# Patient Record
Sex: MALE | Race: WHITE
[De-identification: names, ages, dates, MRNs, and addresses within clinical notes are randomized per-mention and may not be internally consistent; named-entity substitution may affect disease eponyms.]

---

## 2017-01-05 ENCOUNTER — PRIOR ORIGINAL RECORDS (OUTPATIENT)
Dept: OTHER | Age: 77
End: 2017-01-05

## 2017-01-05 ENCOUNTER — HOSPITAL ENCOUNTER (OUTPATIENT)
Dept: LAB | Facility: HOSPITAL | Age: 77
Discharge: HOME | End: 2017-01-05
Attending: INTERNAL MEDICINE
Payer: MEDICARE

## 2017-01-05 DIAGNOSIS — I48.91 ATRIAL FIBRILLATION (HCC): ICD-10-CM

## 2017-01-05 LAB
ALBUMIN SERPL-MCNC: 4 G/DL (ref 3.5–4.8)
ALP LIVER SERPL-CCNC: 71 U/L (ref 45–117)
ALT SERPL-CCNC: 27 U/L (ref 17–63)
AST SERPL-CCNC: 24 U/L (ref 15–41)
BILIRUB SERPL-MCNC: 1.1 MG/DL (ref 0.1–2)
BUN BLD-MCNC: 15 MG/DL (ref 8–20)
CALCIUM BLD-MCNC: 8.3 MG/DL (ref 8.3–10.3)
CHLORIDE: 106 MMOL/L (ref 101–111)
CHOLEST SMN-MCNC: 83 MG/DL (ref ?–200)
CO2: 30 MMOL/L (ref 22–32)
CREAT BLD-MCNC: 0.86 MG/DL (ref 0.7–1.3)
GLUCOSE BLD-MCNC: 95 MG/DL (ref 70–99)
HDLC SERPL-MCNC: 59 MG/DL (ref 45–?)
HDLC SERPL: 1.41 {RATIO} (ref ?–4.97)
LDLC SERPL CALC-MCNC: 13 MG/DL (ref ?–130)
M PROTEIN MFR SERPL ELPH: 7 G/DL (ref 6.1–8.3)
NONHDLC SERPL-MCNC: 24 MG/DL (ref ?–130)
POC INR: 2.8 (ref 0.8–1.3)
POTASSIUM SERPL-SCNC: 4.1 MMOL/L (ref 3.6–5.1)
SODIUM SERPL-SCNC: 140 MMOL/L (ref 136–144)
TRIGLYCERIDES: 56 MG/DL (ref ?–150)
VLDL: 11 MG/DL (ref 5–40)

## 2017-01-05 PROCEDURE — 85610 PROTHROMBIN TIME: CPT

## 2017-01-05 PROCEDURE — 36415 COLL VENOUS BLD VENIPUNCTURE: CPT | Performed by: INTERNAL MEDICINE

## 2017-01-05 PROCEDURE — 80053 COMPREHEN METABOLIC PANEL: CPT | Performed by: INTERNAL MEDICINE

## 2017-01-05 PROCEDURE — 80061 LIPID PANEL: CPT | Performed by: INTERNAL MEDICINE

## 2017-01-11 LAB
ALBUMIN: 4 G/DL
ALKALINE PHOSPHATATE(ALK PHOS): 71 IU/L
BILIRUBIN TOTAL: 1.1 MG/DL
BUN: 15 MG/DL
CALCIUM: 8.3 MG/DL
CHLORIDE: 106 MEQ/L
CHOLESTEROL, TOTAL: 83 MG/DL
CREATININE, SERUM: 0.86 MG/DL
GLUCOSE: 95 MG/DL
HDL CHOLESTEROL: 59 MG/DL
LDL CHOLESTEROL: 13 MG/DL
POTASSIUM, SERUM: 4.1 MEQ/L
PROTEIN, TOTAL: 7 G/DL
SGOT (AST): 24 IU/L
SGPT (ALT): 27 IU/L
SODIUM: 140 MEQ/L
TRIGLYCERIDES: 56 MG/DL

## 2017-01-16 ENCOUNTER — PRIOR ORIGINAL RECORDS (OUTPATIENT)
Dept: OTHER | Age: 77
End: 2017-01-16

## 2017-02-24 ENCOUNTER — HOSPITAL ENCOUNTER (OUTPATIENT)
Dept: LAB | Facility: HOSPITAL | Age: 77
Discharge: HOME | End: 2017-02-24
Attending: INTERNAL MEDICINE
Payer: MEDICARE

## 2017-02-24 DIAGNOSIS — I48.91 ATRIAL FIBRILLATION (HCC): ICD-10-CM

## 2017-02-24 LAB — POC INR: 3.6 (ref 0.8–1.3)

## 2017-02-24 PROCEDURE — 85610 PROTHROMBIN TIME: CPT

## 2017-03-10 ENCOUNTER — HOSPITAL ENCOUNTER (OUTPATIENT)
Dept: LAB | Facility: HOSPITAL | Age: 77
Discharge: HOME | End: 2017-03-10
Attending: INTERNAL MEDICINE
Payer: MEDICARE

## 2017-03-10 DIAGNOSIS — I48.91 ATRIAL FIBRILLATION (HCC): ICD-10-CM

## 2017-03-10 LAB — POC INR: 3.4 (ref 0.8–1.3)

## 2017-03-10 PROCEDURE — 85610 PROTHROMBIN TIME: CPT

## 2017-03-23 ENCOUNTER — HOSPITAL ENCOUNTER (OUTPATIENT)
Dept: LAB | Facility: HOSPITAL | Age: 77
Discharge: HOME | End: 2017-03-23
Attending: INTERNAL MEDICINE
Payer: MEDICARE

## 2017-03-23 DIAGNOSIS — I48.91 ATRIAL FIBRILLATION (HCC): ICD-10-CM

## 2017-03-23 LAB — POC INR: 2.9 (ref 0.8–1.3)

## 2017-03-23 PROCEDURE — 85610 PROTHROMBIN TIME: CPT

## 2017-04-01 ENCOUNTER — APPOINTMENT (OUTPATIENT)
Dept: CT IMAGING | Facility: HOSPITAL | Age: 77
End: 2017-04-01
Attending: EMERGENCY MEDICINE
Payer: MEDICARE

## 2017-04-01 ENCOUNTER — HOSPITAL ENCOUNTER (EMERGENCY)
Facility: HOSPITAL | Age: 77
Discharge: HOME | End: 2017-04-01
Attending: EMERGENCY MEDICINE
Payer: MEDICARE

## 2017-04-01 VITALS
TEMPERATURE: 98 F | HEART RATE: 68 BPM | RESPIRATION RATE: 16 BRPM | DIASTOLIC BLOOD PRESSURE: 71 MMHG | OXYGEN SATURATION: 95 % | HEIGHT: 72 IN | SYSTOLIC BLOOD PRESSURE: 139 MMHG | BODY MASS INDEX: 23.03 KG/M2 | WEIGHT: 170 LBS

## 2017-04-01 DIAGNOSIS — R51.9 NONINTRACTABLE HEADACHE, UNSPECIFIED CHRONICITY PATTERN, UNSPECIFIED HEADACHE TYPE: Primary | ICD-10-CM

## 2017-04-01 PROCEDURE — 36415 COLL VENOUS BLD VENIPUNCTURE: CPT

## 2017-04-01 PROCEDURE — 70450 CT HEAD/BRAIN W/O DYE: CPT

## 2017-04-01 PROCEDURE — 93005 ELECTROCARDIOGRAM TRACING: CPT

## 2017-04-01 PROCEDURE — 99284 EMERGENCY DEPT VISIT MOD MDM: CPT

## 2017-04-01 PROCEDURE — 93010 ELECTROCARDIOGRAM REPORT: CPT

## 2017-04-01 PROCEDURE — 80048 BASIC METABOLIC PNL TOTAL CA: CPT | Performed by: EMERGENCY MEDICINE

## 2017-04-01 PROCEDURE — 84484 ASSAY OF TROPONIN QUANT: CPT | Performed by: EMERGENCY MEDICINE

## 2017-04-01 PROCEDURE — 99285 EMERGENCY DEPT VISIT HI MDM: CPT

## 2017-04-01 PROCEDURE — 85025 COMPLETE CBC W/AUTO DIFF WBC: CPT | Performed by: EMERGENCY MEDICINE

## 2017-04-01 NOTE — ED PROVIDER NOTES
Patient Seen in: [facility] Emergency Department    History   Patient presents with: Other    Stated Complaint: other    HPI    Jared Pinon is a pleasant 39-year-old male coming with complaints of evaluation.   Patient states that for the last 4 days in FOR PAIN MANAGEMENT    FLUOROSCOPIC GUIDANCE NEEDLE PLACEMENT  1/8/2014    Comment Procedure: LUMBAR FACET INJECTION OR MEDIAL BRANCH NERVE BLOCK;  Surgeon: Todd Clark MD;  Location: [address] [place] [address] Location: [facility]    INJ PARAVERT F JNT L/S 2 LEV Right 8/5/2015    Comment Procedure: LUMBAR FACET INJECTION OR MEDIAL BRANCH NERVE BLOCK;  Surgeon: Augustus Benavides MD;  Location: [address] Comment Procedure: TRANSFORAMINAL EPIDURAL - LUMBAR;  Surgeon: Kadeem Park MD;  Location: [facility] FOR PAIN MANAGEMENT    INJECTION, ANESTHETIC/STEROID, TRANSFORAMINAL EPIDURAL; LUMBAR/SACRAL, ADD'L LEVEL Right 9/4/2015    Comment Procedure: Mary Jo Mater FOLLOWING EVENTS N/A 10/2/2015    Comment Procedure: LUMBAR EPIDURAL;  Surgeon: Lilo Lozano MD;  Location: [address] [place] [address]    OTHER SURGICAL HISTORY      Comment Tonsillectomy uvelectomy     OTHER SURGICAL HISTORY  10/2015    Comment purvi MEDIAL BRANCH NERVE BLOCK;  Surgeon: Dequan Leos MD;  Location: [facility] FOR PAIN MANAGEMENT    INJECTION, ANESTHETIC/STEROID, TRANSFORAMINAL EPIDURAL; LUMBAR/SACRAL, SINGLE LEVEL Right 3/2/2016    Comment Procedure: TRANSFORAMINAL EPIDURAL - LUMBAR; ANESTHETIC/STEROID, TRANSFORAMINAL EPIDURAL; LUMBAR/SACRAL, SINGLE LEVEL Right 3/29/2016    Comment Procedure: TRANSFORAMINAL EPIDURAL - LUMBAR;  Surgeon: Bree Matos MD;  Location: [address] [place] [address]    INJECTION, ANESTHETIC/STEROID, TRANS Status: Never Used                        Alcohol Use: Yes           0.0 oz/week       0 Standard drinks or equivalent per week       Comment: 0-2 per month      Review of Systems    Positive for stated complaint: other  Other systems are as noted in HPI. following:     .0 (*)     MCH 33.3 (*)     All other components within normal limits   TROPONIN I - Normal   CBC WITH DIFFERENTIAL WITH PLATELET    Narrative: The following orders were created for panel order CBC WITH DIFFERENTIAL WITH PLATELET.

## 2017-04-06 PROBLEM — D64.9 MILD ANEMIA: Status: ACTIVE | Noted: 2017-04-06

## 2017-04-06 PROBLEM — R73.9 ELEVATED BLOOD SUGAR: Status: ACTIVE | Noted: 2017-04-06

## 2017-04-06 PROCEDURE — 83921 ORGANIC ACID SINGLE QUANT: CPT | Performed by: OTHER

## 2017-04-06 PROCEDURE — 86038 ANTINUCLEAR ANTIBODIES: CPT | Performed by: OTHER

## 2017-04-06 PROCEDURE — 84425 ASSAY OF VITAMIN B-1: CPT | Performed by: OTHER

## 2017-04-06 PROCEDURE — 86235 NUCLEAR ANTIGEN ANTIBODY: CPT | Performed by: OTHER

## 2017-04-06 PROCEDURE — 86618 LYME DISEASE ANTIBODY: CPT | Performed by: OTHER

## 2017-04-06 PROCEDURE — 82607 VITAMIN B-12: CPT | Performed by: OTHER

## 2017-04-06 PROCEDURE — 82746 ASSAY OF FOLIC ACID SERUM: CPT | Performed by: OTHER

## 2017-04-06 PROCEDURE — 86225 DNA ANTIBODY NATIVE: CPT | Performed by: OTHER

## 2017-04-11 PROCEDURE — 86235 NUCLEAR ANTIGEN ANTIBODY: CPT | Performed by: OTHER

## 2017-04-11 PROCEDURE — 36415 COLL VENOUS BLD VENIPUNCTURE: CPT | Performed by: OTHER

## 2017-04-11 PROCEDURE — 86225 DNA ANTIBODY NATIVE: CPT | Performed by: OTHER

## 2017-04-20 ENCOUNTER — HOSPITAL ENCOUNTER (OUTPATIENT)
Dept: LAB | Facility: HOSPITAL | Age: 77
Discharge: HOME | End: 2017-04-20
Attending: INTERNAL MEDICINE
Payer: MEDICARE

## 2017-04-20 DIAGNOSIS — I48.91 ATRIAL FIBRILLATION (HCC): ICD-10-CM

## 2017-04-20 PROCEDURE — 85610 PROTHROMBIN TIME: CPT

## 2017-05-11 ENCOUNTER — HOSPITAL ENCOUNTER (OUTPATIENT)
Dept: LAB | Facility: HOSPITAL | Age: 77
Discharge: HOME | End: 2017-05-11
Attending: INTERNAL MEDICINE
Payer: MEDICARE

## 2017-05-11 DIAGNOSIS — I48.91 ATRIAL FIBRILLATION (HCC): ICD-10-CM

## 2017-05-11 PROCEDURE — 85610 PROTHROMBIN TIME: CPT

## 2017-05-26 ENCOUNTER — HOSPITAL ENCOUNTER (OUTPATIENT)
Dept: LAB | Facility: HOSPITAL | Age: 77
Discharge: HOME | End: 2017-05-26
Attending: INTERNAL MEDICINE
Payer: MEDICARE

## 2017-05-26 DIAGNOSIS — I48.91 ATRIAL FIBRILLATION (HCC): ICD-10-CM

## 2017-05-26 PROCEDURE — 85610 PROTHROMBIN TIME: CPT

## 2017-06-20 PROBLEM — R76.8 AUTOANTIBODY TITER POSITIVE: Status: ACTIVE | Noted: 2017-06-20

## 2017-06-20 PROBLEM — G31.9 CEREBRAL ATROPHY (HCC): Status: ACTIVE | Noted: 2017-06-20

## 2017-06-20 PROBLEM — R41.9 COGNITIVE COMPLAINTS: Status: ACTIVE | Noted: 2017-06-20

## 2017-06-20 PROBLEM — I67.89 CEREBRAL MICROVASCULAR DISEASE: Status: ACTIVE | Noted: 2017-06-20

## 2017-06-23 ENCOUNTER — HOSPITAL ENCOUNTER (OUTPATIENT)
Dept: LAB | Facility: HOSPITAL | Age: 77
Discharge: HOME | End: 2017-06-23
Attending: INTERNAL MEDICINE
Payer: MEDICARE

## 2017-06-23 DIAGNOSIS — I48.91 ATRIAL FIBRILLATION (HCC): ICD-10-CM

## 2017-06-23 PROCEDURE — 85610 PROTHROMBIN TIME: CPT

## 2017-07-21 ENCOUNTER — HOSPITAL ENCOUNTER (OUTPATIENT)
Dept: LAB | Facility: HOSPITAL | Age: 77
Discharge: HOME | End: 2017-07-21
Attending: INTERNAL MEDICINE
Payer: MEDICARE

## 2017-07-21 DIAGNOSIS — I48.91 ATRIAL FIBRILLATION (HCC): ICD-10-CM

## 2017-07-21 LAB — POC INR: 2.3 (ref 0.8–1.3)

## 2017-07-21 PROCEDURE — 85610 PROTHROMBIN TIME: CPT

## 2017-08-18 ENCOUNTER — HOSPITAL ENCOUNTER (OUTPATIENT)
Dept: LAB | Facility: HOSPITAL | Age: 77
Discharge: HOME | End: 2017-08-18
Attending: INTERNAL MEDICINE
Payer: MEDICARE

## 2017-08-18 DIAGNOSIS — I48.91 ATRIAL FIBRILLATION (HCC): ICD-10-CM

## 2017-08-18 LAB — POC INR: 4.2 (ref 0.8–1.3)

## 2017-08-18 PROCEDURE — 85610 PROTHROMBIN TIME: CPT

## 2017-09-01 ENCOUNTER — HOSPITAL ENCOUNTER (OUTPATIENT)
Dept: LAB | Facility: HOSPITAL | Age: 77
Discharge: HOME | End: 2017-09-01
Attending: INTERNAL MEDICINE
Payer: MEDICARE

## 2017-09-01 DIAGNOSIS — I48.91 ATRIAL FIBRILLATION (HCC): ICD-10-CM

## 2017-09-01 LAB — POC INR: 1.4 (ref 0.8–1.3)

## 2017-09-01 PROCEDURE — 85610 PROTHROMBIN TIME: CPT

## 2017-09-08 ENCOUNTER — HOSPITAL ENCOUNTER (OUTPATIENT)
Dept: LAB | Facility: HOSPITAL | Age: 77
Discharge: HOME | End: 2017-09-08
Attending: INTERNAL MEDICINE
Payer: MEDICARE

## 2017-09-08 DIAGNOSIS — I48.91 ATRIAL FIBRILLATION (HCC): ICD-10-CM

## 2017-09-08 LAB — POC INR: 2.3 (ref 0.8–1.3)

## 2017-09-08 PROCEDURE — 85610 PROTHROMBIN TIME: CPT

## 2017-09-29 ENCOUNTER — HOSPITAL ENCOUNTER (OUTPATIENT)
Dept: LAB | Facility: HOSPITAL | Age: 77
Discharge: HOME | End: 2017-09-29
Attending: INTERNAL MEDICINE
Payer: MEDICARE

## 2017-09-29 DIAGNOSIS — I48.91 ATRIAL FIBRILLATION (HCC): ICD-10-CM

## 2017-09-29 PROCEDURE — 85610 PROTHROMBIN TIME: CPT

## 2017-10-13 ENCOUNTER — HOSPITAL ENCOUNTER (OUTPATIENT)
Dept: LAB | Facility: HOSPITAL | Age: 77
Discharge: HOME | End: 2017-10-13
Attending: INTERNAL MEDICINE
Payer: MEDICARE

## 2017-10-13 DIAGNOSIS — I48.91 ATRIAL FIBRILLATION (HCC): ICD-10-CM

## 2017-10-13 PROCEDURE — 85610 PROTHROMBIN TIME: CPT

## 2017-10-27 ENCOUNTER — HOSPITAL ENCOUNTER (OUTPATIENT)
Dept: LAB | Facility: HOSPITAL | Age: 77
Discharge: HOME | DRG: 388 | End: 2017-10-27
Attending: INTERNAL MEDICINE
Payer: MEDICARE

## 2017-10-27 DIAGNOSIS — I48.91 ATRIAL FIBRILLATION (HCC): ICD-10-CM

## 2017-10-27 PROCEDURE — 85610 PROTHROMBIN TIME: CPT

## 2017-10-29 ENCOUNTER — APPOINTMENT (OUTPATIENT)
Dept: CT IMAGING | Facility: HOSPITAL | Age: 77
DRG: 388 | End: 2017-10-29
Attending: EMERGENCY MEDICINE
Payer: MEDICARE

## 2017-10-29 ENCOUNTER — HOSPITAL ENCOUNTER (INPATIENT)
Facility: HOSPITAL | Age: 77
LOS: 5 days | Discharge: HOME HEALTH | DRG: 388 | End: 2017-11-03
Attending: EMERGENCY MEDICINE | Admitting: INTERNAL MEDICINE
Payer: MEDICARE

## 2017-10-29 DIAGNOSIS — N28.9 RENAL INSUFFICIENCY: ICD-10-CM

## 2017-10-29 DIAGNOSIS — K56.609 SMALL BOWEL OBSTRUCTION (HCC): Primary | ICD-10-CM

## 2017-10-29 DIAGNOSIS — I48.91 ATRIAL FIBRILLATION AND FLUTTER (HCC): Chronic | ICD-10-CM

## 2017-10-29 DIAGNOSIS — I48.92 ATRIAL FIBRILLATION AND FLUTTER (HCC): Chronic | ICD-10-CM

## 2017-10-29 PROCEDURE — 74176 CT ABD & PELVIS W/O CONTRAST: CPT | Performed by: EMERGENCY MEDICINE

## 2017-10-29 RX ORDER — HYDROMORPHONE HYDROCHLORIDE 1 MG/ML
0.5 INJECTION, SOLUTION INTRAMUSCULAR; INTRAVENOUS; SUBCUTANEOUS EVERY 30 MIN PRN
Status: DISCONTINUED | OUTPATIENT
Start: 2017-10-29 | End: 2017-10-30

## 2017-10-29 RX ORDER — HYDROMORPHONE HYDROCHLORIDE 1 MG/ML
0.5 INJECTION, SOLUTION INTRAMUSCULAR; INTRAVENOUS; SUBCUTANEOUS ONCE
Status: COMPLETED | OUTPATIENT
Start: 2017-10-29 | End: 2017-10-29

## 2017-10-29 RX ORDER — ONDANSETRON 2 MG/ML
4 INJECTION INTRAMUSCULAR; INTRAVENOUS ONCE
Status: COMPLETED | OUTPATIENT
Start: 2017-10-29 | End: 2017-10-29

## 2017-10-29 RX ORDER — HYDROMORPHONE HYDROCHLORIDE 1 MG/ML
1 INJECTION, SOLUTION INTRAMUSCULAR; INTRAVENOUS; SUBCUTANEOUS ONCE
Status: COMPLETED | OUTPATIENT
Start: 2017-10-29 | End: 2017-10-29

## 2017-10-29 RX ORDER — SODIUM CHLORIDE 9 MG/ML
INJECTION, SOLUTION INTRAVENOUS CONTINUOUS
Status: DISCONTINUED | OUTPATIENT
Start: 2017-10-29 | End: 2017-10-30

## 2017-10-29 RX ORDER — ONDANSETRON 2 MG/ML
4 INJECTION INTRAMUSCULAR; INTRAVENOUS EVERY 4 HOURS PRN
Status: DISCONTINUED | OUTPATIENT
Start: 2017-10-29 | End: 2017-10-30

## 2017-10-29 NOTE — ED INITIAL ASSESSMENT (HPI)
Patient c/o abdominal pain that began last night. C/O difficulty having bowel movements for the past two days, small bowel movement today. Also c/o nausea and vomiting, patient has a history of bowel obstruction x2 and family states symptoms are similar.

## 2017-10-29 NOTE — ED PROVIDER NOTES
Patient Seen in: [facility] Emergency Department    History   Patient presents with:  Abdomen/Flank Pain (GI/)    Stated Complaint: abdominal pain; vomiting    HPI    23-year-old male complaint of vomiting this patient has had a history of bowel o SURGERY  5/23/03: Jeanne Sanches      Comment: normal  3/16/13: COLONOSCOPY,DIAGNOSTIC      Comment: descending polyp  at 40 cm  1/8/2014: DRAIN/INJECT LARGE JOINT/BURSA      Comment: Procedure: BURSA INJECTION;  Surgeon:                Malik Wright, Procedure: LUMBAR FACET INJECTION OR MEDIAL                BRANCH NERVE BLOCK;  Surgeon: Fly Melendez MD;  Location: [address] [place] [address] MANAGEMENT  8/5/2015: INJ PARAVERT F JNT L/S 3 LEV Right      Comment: Procedure: LUMBAR FACET INJECT [place] FOR PAIN MANAGEMENT  3/2/2016: INJECTION, ANESTHETIC/STEROID, TRANSFORAMINAL * Right      Comment: Procedure: TRANSFORAMINAL EPIDURAL - LUMBAR;                 Surgeon: Yfn Briscoe MD;  Location: [address] [place] [address] PAIN MANAGEMENT  8/5/2015: PATIENT DOCUMENTED NOT TO HAVE EXPERIENCED ANY* Right      Comment: Procedure: LUMBAR FACET INJECTION OR MEDIAL                BRANCH NERVE BLOCK;  Surgeon: Ernesto Ricci MD;  Location: [address] [place] [address] TRANSFORAMINAL EPIDURAL - LUMBAR;                 Surgeon: Manisha Acevedo MD;  Location: [address] [place] [address] MANAGEMENT  3/29/2016: PATIENT DOCUMENTED NOT TO HAVE EXPERIENCED ANY* Right      Comment: Procedure: TRANSFORAMINAL EPIDURAL - LUM [facility] PREOPERATIVE ORDER FOR IV ANT* Right      Comment: Procedure: LUMBAR FACET INJECTION OR MEDIAL                BRANCH NERVE BLOCK;  Surgeon: Karol Benavidez MD;  Location: [address] [place] [address] MANAGEMENT  2/1/2016: PATIENT [facility] Vitals [10/29/17 1840]  BP: 152/89  Pulse: 109  Resp: 19  Temp: 98 °F (36.7 °C)  Temp src: Temporal  SpO2: 95 %  O2 Device: None (Room air)    Current:BP (!) 132/7   Pulse (!) 10   Temp 98 °F (36.7 °C) (Temporal)   Resp 20   Ht 180.3 cm (5' 11\")   Wt 81. 6 results for these tests on the individual orders.    SCAN SLIDE   URINALYSIS WITH CULTURE REFLEX   RAINBOW DRAW LAVENDER   RAINBOW DRAW LIGHT GREEN   RAINBOW DRAW GOLD   RAINBOW [facility] Course as of Nov 08 1338  -----------------------------------

## 2017-10-30 ENCOUNTER — APPOINTMENT (OUTPATIENT)
Dept: GENERAL RADIOLOGY | Facility: HOSPITAL | Age: 77
DRG: 388 | End: 2017-10-30
Attending: HOSPITALIST
Payer: MEDICARE

## 2017-10-30 PROCEDURE — 71010 XR CHEST AP PORTABLE  (CPT=71010): CPT | Performed by: HOSPITALIST

## 2017-10-30 PROCEDURE — 99291 CRITICAL CARE FIRST HOUR: CPT | Performed by: HOSPITALIST

## 2017-10-30 RX ORDER — SODIUM CHLORIDE 9 MG/ML
INJECTION, SOLUTION INTRAVENOUS CONTINUOUS
Status: DISCONTINUED | OUTPATIENT
Start: 2017-10-30 | End: 2017-11-01

## 2017-10-30 RX ORDER — SODIUM CHLORIDE 9 MG/ML
1000 INJECTION, SOLUTION INTRAVENOUS ONCE
Status: COMPLETED | OUTPATIENT
Start: 2017-10-30 | End: 2017-10-30

## 2017-10-30 RX ORDER — SODIUM CHLORIDE 9 MG/ML
INJECTION, SOLUTION INTRAVENOUS CONTINUOUS
Status: DISCONTINUED | OUTPATIENT
Start: 2017-10-30 | End: 2017-10-30

## 2017-10-30 RX ORDER — SODIUM CHLORIDE 9 MG/ML
INJECTION, SOLUTION INTRAVENOUS ONCE
Status: COMPLETED | OUTPATIENT
Start: 2017-10-30 | End: 2017-10-30

## 2017-10-30 RX ORDER — ONDANSETRON 2 MG/ML
4 INJECTION INTRAMUSCULAR; INTRAVENOUS EVERY 6 HOURS PRN
Status: DISCONTINUED | OUTPATIENT
Start: 2017-10-30 | End: 2017-11-03

## 2017-10-30 RX ORDER — HYDROMORPHONE HYDROCHLORIDE 1 MG/ML
0.5 INJECTION, SOLUTION INTRAMUSCULAR; INTRAVENOUS; SUBCUTANEOUS EVERY 4 HOURS PRN
Status: DISCONTINUED | OUTPATIENT
Start: 2017-10-30 | End: 2017-11-01

## 2017-10-30 NOTE — PLAN OF CARE
Assumed care of patient at 1600. Patient flagging with sepsis. Vitals checked and Dr. Rehana Morales paged with patients condition.  No call back received from MD. RRT called due to low BP, low urine output (per patient void x1 since 7am),increasing need for supp

## 2017-10-30 NOTE — CDS QUERY
Clarification – Chronic Renal Insufficiency/CRI  CLINICAL DOCUMENTATION CLARIFICATION FORM  Dear Doctor:  Clinical information (provided below) indicates Chronic Renal Insufficiency/CRI which is an ICD-10-CM equivalent of Chronic Kidney Disease/CKD without

## 2017-10-30 NOTE — PLAN OF CARE
GASTROINTESTINAL - ADULT    • Maintains or returns to baseline bowel function Not Progressing          GASTROINTESTINAL - ADULT    • Minimal or absence of nausea and vomiting Progressing        GENITOURINARY - ADULT    • Absence of urinary retention Progre

## 2017-10-30 NOTE — H&P
[address] [facility] Patient Status:  Inpatient    3/4/1940 MRN [id number]   [facility] 3NW-A Attending Betsy Romero MD   [facility] Day # 1 PCP Shai Mike MD     History of Present Illness:  R Margie Peters MD;  Location: [address] MANAGEMENT  1/8/2014: FLUOROSCOPIC GUIDANCE NEEDLE PLACEMENT      Comment: Procedure: LUMBAR FACET INJECTION OR MEDIAL                BRANCH NERVE BLOCK;  Surgeon: Linsey Novak Procedure: LUMBAR FACET INJECTION OR MEDIAL                BRANCH NERVE BLOCK;  Surgeon: Shana Ordonez MD;  Location: [address] [place] [address] MANAGEMENT  8/20/2015: INJECTION, ANESTHETIC/STEROID, TRANSFORAMINAL * N/A      Comment: Procedure: [facility]  3/10/2016: INJECTION, ANESTHETIC/STEROID, TRANSFORAMINAL * Right      Comment: Procedure: TRANSFORAMINAL EPIDURAL - LUMBAR;                 Surgeon: Yfn Briscoe MD;  Location: [address] [place] [address] MD;  Location: [address] [place] [address] Reyna Ludwig MANAGEMENT  8/20/2015: PATIENT DOCUMENTED NOT TO HAVE EXPERIENCED ANY* N/A      Comment: Procedure: TRANSFORAMINAL EPIDURAL - LUMBAR;                 Surgeon: Eliazar Ambrsoio MD;  Location: [address] [place] [address] Procedure: TRANSFORAMINAL EPIDURAL - LUMBAR;                 Surgeon: Dequan Leos MD;  Location: [address] [place] [address] MANAGEMENT  1/8/2014: PATIENT [place] [facility] PRE[facility]ATIVE ORDER FOR IV ANT*      Comment: Procedure: LUMBAR FACET INJECTION OR MANAGEMENT  2/1/2016: PATIENT [place] PREOPERATIVE ORDER FOR IV ANT* Right      Comment: Procedure: LUMBAR FACET INJECTION OR MEDIAL                BRANCH NERVE BLOCK;  Surgeon: Bhanu Hatch MD;  Location: [address] [place] [address] 1 tablet (10 mg total) by mouth nightly. Disp: 30 tablet Rfl: 3    Warfarin Sodium 5 MG Oral Tab 1 tablet daily as directed by coumadin clinic Disp: 30 tablet Rfl: 6 Taking   Melatonin 5 MG Oral Tab Take by mouth.  Disp:  Rfl:  Taking   Multiple Vitamin (TA 140.0 10/30/2017   CREATSERUM 2.30 10/30/2017   BUN 39 10/30/2017    10/30/2017   K 3.9 10/30/2017    10/30/2017   CO2 26.0 10/30/2017    10/30/2017   CA 8.9 10/30/2017   ALB 4.6 10/29/2017   ALKPHO 93 10/29/2017   BILT 1.9 10/29/2017 alzheimers- hold meds  8. GI proph- protonix IV  9. dvt proph- scds  10.  Discussed POC with dtr Domonique Colin MD  10/30/2017  9:32 AM

## 2017-10-30 NOTE — CONSULTS
[facility]  Report of Consultation    Niesha Cooper Patient Status:  Inpatient    3/4/1940 MRN [id number]   [facility] 3NW-A Attending Willi Cuenca MD   [address] Day # 1 PCP Sarahi Mcfarland MD     Reason for Consultation: SURGERY  5/23/03: Emilia Walden      Comment: normal  3/16/13: COLONOSCOPY,DIAGNOSTIC      Comment: descending polyp  at 40 cm  1/8/2014: DRAIN/INJECT LARGE JOINT/BURSA      Comment: Procedure: BURSA INJECTION;  Surgeon:                Toni Chandler, Procedure: LUMBAR FACET INJECTION OR MEDIAL                BRANCH NERVE BLOCK;  Surgeon: Mine Sommers MD;  Location: [address] [place] [address] MANAGEMENT  8/5/2015: INJ PARAVERT F JNT L/S 3 LEV Right      Comment: Procedure: LUMBAR FACET INJECT [facility]  3/2/2016: INJECTION, ANESTHETIC/STEROID, TRANSFORAMINAL * Right      Comment: Procedure: TRANSFORAMINAL EPIDURAL - LUMBAR;                 Surgeon: Chava Márquez MD;  Location: [address] [place] [address] PAIN MANAGEMENT  8/5/2015: PATIENT DOCUMENTED NOT TO HAVE EXPERIENCED ANY* Right      Comment: Procedure: LUMBAR FACET INJECTION OR MEDIAL                BRANCH NERVE BLOCK;  Surgeon: Leigha Nazario MD;  Location: [address] [place] [address] TRANSFORAMINAL EPIDURAL - LUMBAR;                 Surgeon: Aurea Jaquez MD;  Location: [address] [place] [address] MANAGEMENT  3/29/2016: PATIENT DOCUMENTED NOT TO HAVE EXPERIENCED ANY* Right      Comment: Procedure: TRANSFORAMINAL EPIDURAL - LUM [facility] PREOPERATIVE ORDER FOR IV ANT* Right      Comment: Procedure: LUMBAR FACET INJECTION OR MEDIAL                BRANCH NERVE BLOCK;  Surgeon: Augustus Benavides MD;  Location: [address] [place] [address] MANAGEMENT  2/1/2016: [facility] in Sodium Chloride 0.9 % 10 mL IV push, 40 mg, Intravenous, Q24H    Home Medications:      No current facility-administered medications on file prior to encounter.    Current Outpatient Prescriptions on File Prior to Encounter:  Memantine HCl 5 MG Oral Tab palpation, hypoactive BS  LYMPHATIC: no lymphadenopathy  EXTREMITIES: no cyanosis, clubbing or edema    .     Laboratory Data:  Recent Labs   Lab  10/29/17   1845  10/29/17   1939  10/30/17   0546   WBC  4.0   --   1.9*   HGB  16.8   --   15.3   MCV  99.0 calcification. ADRENALS:  No mass or enlargement. AORTA/VASCULAR:  Atherosclerotic vascular calcifications seen throughout the aorta and aortic branches. No evidence for aortic aneurysm. RETROPERITONEUM:  No mass or adenopathy.   BOWEL/MESENTERY:  Finding STATED HISTORY: (As transcribed by Technologist)  Patient offered no additional history at this time. FINDINGS:   Enteric catheter traverses the esophagus with the tip just distal to the GE junction.   Agree with preliminary radiology report from [facility] myelopathy or radiculopathy     Spondylolisthesis of lumbar region     MIKE on CPAP     Elevated blood sugar     Mild anemia     Cerebral atrophy     Cerebral microvascular disease     Autoantibody titer positive     Cognitive complaints     Renal insuffici

## 2017-10-30 NOTE — PROGRESS NOTES
2310: Dr. Nathan Saenz paged for admit orders. Pt c/o pain in back and right leg, states abdominal pain tolerable. Denies N/V, NGT to LIWS, no output noted at this time. Portable chest xray order placed per protocol to verify placement.  Bed alarm on, pt reports c

## 2017-10-30 NOTE — CODE DOCUMENTATION
[facility]ITAL[facility]  Progress Note     Grabiel Carolyne Patient Status:  Inpatient    3/4/1940 MRN [id number]   Location [unfilled] Attending Radha Hollins MD   Hosp Day # 1 PCP Carol Pittman MD     Chief Complaint: RAPID RESPONSE    S: Patie Imaging: Imaging data reviewed in Epic. Medications:   • pantoprazole (PROTONIX) IV push  40 mg Intravenous Q24H       ASSESSMENT / PLAN:     1. Fever, tachycardia, hypotension/ suspected sepsis due suspected pneumonia  1.  Check LA and trend

## 2017-10-30 NOTE — PHYSICAL THERAPY NOTE
PHYSICAL THERAPY EVALUATION - INPATIENT     Room Number: 303/303-A  Evaluation Date: 10/30/2017  Type of Evaluation: Initial  Physician Order: PT Eval and Treat    Presenting Problem: Small bowel obstruction  Reason for Therapy: Mobility Dysfunction an BURSA INJECTION;  Surgeon:                Jenna Saldana MD;  Location: [address] [place] [address] Drive MANAGEMENT  1/8/2014: FLUOROSCOPIC GUIDANCE NEEDLE PLACEMENT      Comment: Procedure: LUMBAR FACET INJECTION OR MEDIAL                BRANCH NERVE Right      Comment: Procedure: LUMBAR FACET INJECTION OR MEDIAL                BRANCH NERVE BLOCK;  Surgeon: Ann Marie Umaña MD;  Location: [address] [place] [address] MANAGEMENT  8/20/2015: INJECTION, ANESTHETIC/STEROID, TRANSFORAMINAL * N/A      C Location: [address] [place] [address] MANAGEMENT  3/10/2016: INJECTION, ANESTHETIC/STEROID, TRANSFORAMINAL * Right      Comment: Procedure: TRANSFORAMINAL EPIDURAL - LUMBAR;                 Surgeon: Lorelei Goode MD;  Location: [facility] Jennifer Mcfadden MD;  Location: [address] [place] [address] Reyna Ludwig MANAGEMENT  8/20/2015: PATIENT DOCUMENTED NOT TO HAVE EXPERIENCED ANY* N/A      Comment: Procedure: TRANSFORAMINAL EPIDURAL - LUMBAR;                 Surgeon: Helane Closs, MD;  [place]: [facility] Comment: Procedure: TRANSFORAMINAL EPIDURAL - LUMBAR;                 Surgeon: Lilo Lozano MD;  Location: [address] [place] [address] MANAGEMENT  1/8/2014: PATIENT [place] PREOPERATIVE ORDER FOR IV ANT*      Comment: Procedure: LUMBAR FACET FOR PAIN MANAGEMENT  2/1/2016: PATIENT [place] PREOPERATIVE ORDER FOR IV ANT* Right      Comment: Procedure: LUMBAR FACET INJECTION OR MEDIAL                BRANCH NERVE BLOCK;  Surgeon: Yfn Briscoe MD;  Location: [address] [place] [address] mechanics; Relaxation;Repositioning    COGNITION  · Awareness of Errors:  assistance required to identify errors made  · Awareness of Deficits:  decreased awareness of deficits    RANGE OF MOTION AND STRENGTH ASSESSMENT  Upper extremity ROM and strength are sitting up in chair and agreeable to therapy. Sit to stand supervision w/o AD. Amb 150ft supervision w/o AD. Frequently speeding up and slowing down throughout ambulation.   When asked, pt reported feeling steady, balanced, and normal.  Stand to sit in c Potential : Good  Frequency (Obs): 5x/week  Number of Visits to Meet Established Goals: 3      CURRENT GOALS    Goal #1 Patient is able to demonstrate supine - sit EOB @ level: modified independent     Goal #2 Patient is able to demonstrate transfers Sit t

## 2017-10-31 ENCOUNTER — APPOINTMENT (OUTPATIENT)
Dept: GENERAL RADIOLOGY | Facility: HOSPITAL | Age: 77
DRG: 388 | End: 2017-10-31
Attending: NURSE PRACTITIONER
Payer: MEDICARE

## 2017-10-31 ENCOUNTER — APPOINTMENT (OUTPATIENT)
Dept: GENERAL RADIOLOGY | Facility: HOSPITAL | Age: 77
DRG: 388 | End: 2017-10-31
Attending: PHYSICIAN ASSISTANT
Payer: MEDICARE

## 2017-10-31 PROCEDURE — 74020 XR ABDOMEN, OBSTRUCTIVE SERIES (CPT=74020): CPT | Performed by: PHYSICIAN ASSISTANT

## 2017-10-31 PROCEDURE — 71010 XR CHEST AP PORTABLE  (CPT=71010): CPT | Performed by: NURSE PRACTITIONER

## 2017-10-31 NOTE — PLAN OF CARE
Received pt after RN report, transferred to ICU from floor just prior to shift change. Pt a/o x3-4, forgetful. NC 4L, no SOB or CP. ST on monitor, flipped into afib around 0315, rate controlled. BP stable. Low grade fevers.  Critical lactic 4.1, APN no

## 2017-10-31 NOTE — CONSULTS
Marcella Gorgemarie 1284 Patient Status:  Inpatient    3/4/1940 MRN [id number]   [facility] 4SW-A Attending Mayra Espana MD   [address] Day # 2 PCP Elli Casiano MD     Date of Admission: 10/29/2017  Admission Diagnosis CORONARY ANGIO*  12/2014: COLECTOMY      Comment: for bowel obstruction  No date: COLON SURGERY  5/23/03: COLONOSCOPY,DIAGNOSTIC      Comment: normal  3/16/13: COLONOSCOPY,DIAGNOSTIC      Comment: descending polyp  at 40 cm  1/8/2014: DRAIN/INJECT LARGE [place] FOR PAIN MANAGEMENT  2/1/2016: INJ PARAVERT F JNT L/S 2 LEV Right      Comment: Procedure: LUMBAR FACET INJECTION OR MEDIAL                BRANCH NERVE BLOCK;  Surgeon: Mine Sommers MD;  Location: [address] [place] [address] EPIDURAL - LUMBAR;                 Surgeon: Shelbie Estrada MD;  Location: [facility] MANAGEMENT  3/2/2016: INJECTION, ANESTHETIC/STEROID, TRANSFORAMINAL * Right      Comment: Procedure: TRANSFORAMINAL EPIDURAL - LUMBAR; LUMBAR EPIDURAL;  Surgeon:                Ken Harrington MD;  Location: [place] [address] MANAGEMENT  8/5/2015: PATIENT DOCUMENTED NOT TO HAVE EXPERIENCED ANY* Right      Comment: Procedure: LUMBAR FACET INJECTION OR MEDIAL MANAGEMENT  3/10/2016: PATIENT DOCUMENTED NOT TO HAVE EXPERIENCED ANY* Right      Comment: Procedure: TRANSFORAMINAL EPIDURAL - LUMBAR;                 Surgeon: Corbin Garcia MD;  Location: [address] [place] [address] MANAGEMENT  3/29/2016: Georgette Paniagua Bhanu Hatch MD;  Location: [place] [address] MANAGEMENT  1/14/2016: [facility]IENT [place] [facility] PRE[facility] FOR IV ANT* Right      Comment: Procedure: LUMBAR FACET INJECTION OR MEDIAL                BRANCH NERVE BLOCK;  Surgeon: Michelle Luna HYDROcodone-acetaminophen (NORCO) 5-325 MG Oral Tab Take 1 tablet by mouth every 8 (eight) hours as needed for Pain. Disp: 60 tablet Rfl: 0   PANTOPRAZOLE SODIUM 40 MG Oral Tab EC TAKE 1 TABLET (40 MG TOTAL) BY MOUTH EVERY MORNING BEFORE BREAKFAST.  Disp: fatigue  HEENT: denies vision or hearing changes, eye pain, tinnitus, hearing loss, sore throat, epistaxis, sinus congestion  Cardiovascular: denies chest pain, PND, palpitations, edema, orthopnea, or syncope  Respiratory: +SOB, dyspnea on exertion, denies BS.                          Extremity: No clubbing or cyanosis. no edema                          Skin: No rashes or lesions.     Recent Labs   Lab  10/29/17   1845   10/29/17   1939  10/30/17   0546  10/30/17   1753  10/31/17   0437   GLU  156*   --    -- and SBO, less likely developing pneumonia  -awaiting sputum culture  -wean O2 as able  -incentive spirometer  -pain control  -on zosyn which should cover pneumonia   3. SBO:  -conservative management per surgery  -monitor NG tube output  4.  HTN:  -holding

## 2017-10-31 NOTE — PLAN OF CARE
CARDIOVASCULAR - ADULT    • Absence of cardiac arrhythmias or at baseline Progressing        PAIN - ADULT    • Verbalizes/displays adequate comfort level or patient's stated pain goal Progressing        RESPIRATORY - ADULT    • Achieves optimal ventilation

## 2017-10-31 NOTE — PROGRESS NOTES
ICU  Critical Care APN Progress Note    NAME: Phillip Reynolds - ROOM: [facility]- - MRN: [id number] - Age: 65year old - :3/4/1940    History Of Present Illness:  Phillip Reynolds is a 65year old male with PMHx significant for cad and MI s/p angiopla /67   Pulse 116   Temp 99.7 °F (37.6 °C)   Resp 21   Ht 180.3 cm (5' 11\")   Wt 180 lb (81.6 kg)   SpO2 93%   BMI 25.10 kg/m²               Physical Exam:    General Appearance: Alert to self, cooperative, no distress, appears stated age  Neck: No JV PROCEDURE:  XR CHEST AP PORTABLE (CPT=71010)  TECHNIQUE:  AP chest radiograph was obtained. COMPARISON:  [facility] XR CHEST AP PORTABLE  (CPT=71010), 8/10/2015, 10:09.   INDICATIONS:  verify NG placement  PATIENT STATED HISTORY: (As transcribed by Rolanda-Cola

## 2017-10-31 NOTE — PROGRESS NOTES
[facility]  Progress Note    Chase Martin Patient Status:  Inpatient    3/4/1940 MRN [id number]   [facility] 4SW-A Attending Seda Amaro MD   [facility] Day # 2 PCP Lavell Zavala MD     Subjective:  Chase Martin is unlabored   Chest Wall:    No tenderness or deformity    Heart:    Regular rate and rhythm, S1 and S2 normal, no murmur, rub   or gallop   Abdomen:   BS present, soft   Extremities:   Extremities normal, atraumatic, no cyanosis or edema   Pulses:   2+ and alzheimers- hold meds  9. GI proph- protonix IV  10. dvt proph- scds    Stew Reyes MD  10/31/2017  7:33 AM

## 2017-10-31 NOTE — PROGRESS NOTES
[facility]      Sepsis Reassessment Note    /67   Pulse 116   Temp 99.7 °F (37.6 °C)   Resp 21   Ht 180.3 cm (5' 11\")   Wt 180 lb (81.6 kg)   SpO2 93%   BMI 25.10 kg/m²      9:32 PM    Cardiac:  Regularity: Irregular  Rate: Tachycardic  Heart

## 2017-10-31 NOTE — PROGRESS NOTES
[facility]  Progress Note    Yaneth Gaytan Patient Status:  Inpatient    3/4/1940 MRN [id number]   [facility] 4SW-A Attending Omari Grace MD   Hosp Day # 2 PCP Wily Laguerre MD     Subjective:    Patient transferred (degenerative disc disease), lumbar     Strain, lumbosacral, initial encounter     Lumbar radiculitis     Small bowel obstruction     Low back sprain, initial encounter     Spondylosis of lumbar region without myelopathy or radiculopathy     Spondylolisthe

## 2017-10-31 NOTE — CM/SW NOTE
10/31/17 1400   CM/SW Referral Data   Referral Source Physician   Reason for Referral Discharge planning   Informant Patient   Social History   Recreational Drug/Alcohol Use no   Major Changes Last 6 Months no   Domestic/Partner Violence no   Suicidal I

## 2017-10-31 NOTE — PHYSICAL THERAPY NOTE
Pt t/f to ICU on 10/30/17 after rapid response due to tachycardia/hypotension. Pt will need new orders to resume inpt PT when appropriate. jostin Henderson.

## 2017-10-31 NOTE — PHYSICAL THERAPY NOTE
PHYSICAL THERAPY RE-EVALUATION - INPATIENT     Room Number: 465/465-A  Evaluation Date: 10/31/2017  Type of Evaluation: Re-evaluation  Physician Order: PT Eval and Treat    Presenting Problem: SBO, sepsis due to PNA  Reason for Therapy: Mobility Dysfun Past Surgical History  Past Surgical History:  No date: ADENOIDECTOMY      Comment: palate surgery,deviated septum  No date: ANGIOPLASTY (CORONARY)      Comment: one vessel  No date: APPENDECTOMY  6/2014: CABG      Comment: 4 bypass  No date: CATH PE Surgeon: Rylie Mustafa MD;  Location: [address] [place] [address] MANAGEMENT  1/14/2016: Sanjay Nance L/S 2 LEV Right      Comment: Procedure: LUMBAR FACET INJECTION OR MEDIAL                BRANCH NERVE BLOCK;  Surgeon: Rylie Mustafa, TRANSFORAMINAL * N/A      Comment: Procedure: TRANSFORAMINAL EPIDURAL - LUMBAR;                 Surgeon: Todd Clark MD;  Location: [address] MANAGEMENT  9/4/2015: INJECTION, ANESTHETIC/STEROID, TRANSFORAMINAL * Right      Co EXPERIENCED ANY*      Comment: Procedure: LUMBAR FACET INJECTION OR MEDIAL                BRANCH NERVE BLOCK;  Surgeon: Ramonita Gallegos MD;  Location: [address] [place] [address] MANAGEMENT  6/17/2015: PATIENT DOCUMENTED NOT TO HAVE EXPERIENCED ANY Ángel Levin MD;  Location: [address] [place] [address] Reyna Ludwig MANAGEMENT  3/2/2016: PATIENT DOCUMENTED NOT TO HAVE EXPERIENCED ANY* Right      Comment: Procedure: TRANSFORAMINAL EPIDURAL - LUMBAR;                 Surgeon: Bree Matos MD;  Location: [facility] IV ANT* N/A      Comment: Procedure: LUMBAR EPIDURAL;  Surgeon:                Jillian Espinoza MD;  Location: [place] [address] MANAGEMENT  10/2/2015: PATIENT [place] PREOPERATIVE ORDER FOR IV ANT* N/A      Comment: Procedure: LUMBAR EPID Glasses    Prior Level of [place]: PTA, pt was independent w/ all mobility, ADLs, IADLs. He assists his wife w/ mobility and ADLs/IADLs as needed. Pt states that he works on puzzles, etc to try to help his cognition.   States that he drives alone and sitting = 89%  O2 Device: Nasal cannula  Liters of O2:  4  Shortness of breath  Heart Rate:   Blood Pressure: supine prior to eval/tx = 92/54; sitting EOB = 112/71.    Respiratory Rate: 16-40 (decreases with assertive vc to slow and deep breath despit imaging/xray. Amb with RW and min assist x 150 ft with lots of vc's for deep breathing and slowing. Activity recommendations = up to b/s chair TID. Amb with nursing BID to TID.      Exercise/Education Provided:  Educ: activity recommendations, rol assist patient in returning to prior level of function. DISCHARGE RECOMMENDATIONS  PT Discharge Recommendations: Home with home health PT      PLAN  PT Treatment Plan: Bed mobility; Body mechanics; Endurance; Patient education; Family education;Gait train

## 2017-11-01 RX ORDER — HYDROCODONE BITARTRATE AND ACETAMINOPHEN 5; 325 MG/1; MG/1
1 TABLET ORAL EVERY 6 HOURS PRN
Status: DISCONTINUED | OUTPATIENT
Start: 2017-11-01 | End: 2017-11-03

## 2017-11-01 RX ORDER — SODIUM CHLORIDE 450 MG/100ML
INJECTION, SOLUTION INTRAVENOUS CONTINUOUS
Status: DISCONTINUED | OUTPATIENT
Start: 2017-11-01 | End: 2017-11-03

## 2017-11-01 RX ORDER — SODIUM CHLORIDE, SODIUM LACTATE, POTASSIUM CHLORIDE, CALCIUM CHLORIDE 600; 310; 30; 20 MG/100ML; MG/100ML; MG/100ML; MG/100ML
INJECTION, SOLUTION INTRAVENOUS CONTINUOUS
Status: DISCONTINUED | OUTPATIENT
Start: 2017-11-01 | End: 2017-11-01

## 2017-11-01 NOTE — PLAN OF CARE
GASTROINTESTINAL - ADULT    • Minimal or absence of nausea and vomiting Progressing    • Maintains or returns to baseline bowel function Progressing          RESPIRATORY - ADULT    • Achieves optimal ventilation and oxygenation Progressing          Pt rece

## 2017-11-01 NOTE — PROGRESS NOTES
[facility]  Progress Note    Burton Mixon Patient Status:  Inpatient    3/4/1940 MRN [id number]   [facility] 5NW-A Attending Opal Sherman MD   [facility] Day # 3 PCP Roxana Blanco MD     Subjective:  Burton Mixon is 2+ and symmetric all extremities   Skin:   Skin color, texture, turgor normal, no rashes or lesions   Neurologic:   CNII-XII intact, normal strength, sensation and reflexes     throughout   Assessment:  Patient Active Problem List:     ASHD (arteriosclero scds  Sydnei Lerner MD  11/1/2017  8:46 AM

## 2017-11-01 NOTE — PROGRESS NOTES
[facility]  Progress Note    Jerome Snell Patient Status:  Inpatient    3/4/1940 MRN [id number]   [facility] 5NW-A Attending Bianka Vela MD   [facility] Day # 3 PCP Brian Aguilar MD     Subjective:    Patient denies any a radiculopathy     Spondylolisthesis of lumbar region     MIKE on CPAP     Elevated blood sugar     Mild anemia     Cerebral atrophy     Cerebral microvascular disease     Autoantibody titer positive     Cognitive complaints     Renal insufficiency     Sepsi

## 2017-11-01 NOTE — PHYSICAL THERAPY NOTE
PHYSICAL THERAPY TREATMENT NOTE - INPATIENT    Room Number: 737/981-X     Session: 1  Number of Visits to Meet Established Goals: 5    Presenting Problem: SBO, sepsis due to PNA  History related to current admission:     Pt is 65year old male admitted on TRANSLUMINAL CORONARY ANGIO*  12/2014: COLECTOMY      Comment: for bowel obstruction  No date: COLON SURGERY  5/23/03: COLONOSCOPY,DIAGNOSTIC      Comment: normal  3/16/13: COLONOSCOPY,DIAGNOSTIC      Comment: descending polyp  at 40 cm  1/8/2014: DRAIN/IN Location: [address] [place] [address] MANAGEMENT  2/1/2016: INJ PARAVERT F JNT L/S 2 LEV Right      Comment: Procedure: LUMBAR FACET INJECTION OR MEDIAL                BRANCH NERVE BLOCK;  Surgeon: Leigha Nazario MD;  Location: [address] [place] [address] TRANSFORAMINAL EPIDURAL - LUMBAR;                 Surgeon: Norberto Trimble MD;  Location: [address] [place] [address] MANAGEMENT  3/2/2016: INJECTION, ANESTHETIC/STEROID, TRANSFORAMINAL * Right      Comment: Procedure: TRANSFORAMINAL EPIDURAL - REBECCA Procedure: LUMBAR EPIDURAL;  Surgeon:                Ananya Katz MD;  Location: [place] [address] MANAGEMENT  8/5/2015: PATIENT DOCUMENTED NOT TO HAVE EXPERIENCED ANY* Right      Comment: Procedure: LUMBAR FACET INJECTION OR MEDIAL PAIN MANAGEMENT  3/10/2016: PATIENT DOCUMENTED NOT TO HAVE EXPERIENCED ANY* Right      Comment: Procedure: TRANSFORAMINAL EPIDURAL - LUMBAR;                 Surgeon: Anuradha Adams MD;  Location: [address] [place] [address] MANAGEMENT  3/29/2016: P Aurea Jaquez MD;  Location: [address] [place] [address] Drive MANAGEMENT  1/14/2016: [place] [facility] PREOPERATIVE ORDER FOR IV ANT* Right      Comment: Procedure: LUMBAR FACET INJECTION OR MEDIAL                BRANCH NERVE BLOCK;  Surgeon: Inga Butt Standing: Fair -  Dynamic Standing: Fair -    ACTIVITY TOLERANCE  O2 Saturation: 93%  Room air  Shortness of breath  Cues for deep breathing     AM-PAC '6-Clicks' INPATIENT SHORT FORM - BASIC MOBILITY  How much difficulty does the patient currently have. Xuan Band Xuan Band despite c/o fatigue. Pt overall at supervision level with this session with cueing for breathing and energy conservation.  Pt continues to present with deficits of dec strength, dec balance, dec activity endurance, dec awareness of energy conservation techn

## 2017-11-01 NOTE — PROGRESS NOTES
Pulmonary Progress Note        NAME: López Barfield - ROOM: 722/557-D - MRN: [id number] - Age: 65year old - : 3/4/1940        SUBJECTIVE: no complaints today    OBJECTIVE:   17  0657 17  0815 17  0822 17  1215   BP:   144/78 103 106 111 120*   CO2 26.0  --  26.0 22.0 22.0 23.0   BUN 26*  --  39* 52* 62* 43*   CA 9.6  --  8.9 8.3 7.4* 6.7*   MG  --   --   --   --  1.4* 2.5   PHOS  --   --   --   --  3.8  --          Recent Labs   10/29/17  1845 10/29/17  1940 10/30/17  2333 10/ due to SBO.   -continue zosyn, adjust per cultures  2.  Tachypnea/SOB: suspect this is related to metabolic acidosis   -repeat sputum culture pending  -weaned off O2  -encouraged incentive spirometer  -pain control  -on zosyn which should cover pneumonia

## 2017-11-02 RX ORDER — POTASSIUM CHLORIDE 20 MEQ/1
40 TABLET, EXTENDED RELEASE ORAL EVERY 4 HOURS
Status: COMPLETED | OUTPATIENT
Start: 2017-11-02 | End: 2017-11-02

## 2017-11-02 RX ORDER — DONEPEZIL HYDROCHLORIDE 10 MG/1
10 TABLET, FILM COATED ORAL NIGHTLY
Status: DISCONTINUED | OUTPATIENT
Start: 2017-11-02 | End: 2017-11-03

## 2017-11-02 RX ORDER — PANTOPRAZOLE SODIUM 40 MG/1
40 TABLET, DELAYED RELEASE ORAL
Status: DISCONTINUED | OUTPATIENT
Start: 2017-11-02 | End: 2017-11-03

## 2017-11-02 RX ORDER — METOPROLOL SUCCINATE 25 MG/1
25 TABLET, EXTENDED RELEASE ORAL
Status: DISCONTINUED | OUTPATIENT
Start: 2017-11-02 | End: 2017-11-03

## 2017-11-02 RX ORDER — ATORVASTATIN CALCIUM 10 MG/1
10 TABLET, FILM COATED ORAL NIGHTLY
Status: DISCONTINUED | OUTPATIENT
Start: 2017-11-02 | End: 2017-11-03

## 2017-11-02 RX ORDER — MEMANTINE HYDROCHLORIDE 5 MG/1
5 TABLET ORAL 2 TIMES DAILY
Status: DISCONTINUED | OUTPATIENT
Start: 2017-11-02 | End: 2017-11-03

## 2017-11-02 NOTE — PROGRESS NOTES
[facility]  Progress Note    Tammy Escobar Patient Status:  Inpatient    3/4/1940 MRN [id number]   [facility] 5NW-A Attending Radha Fierro MD   [facility] Day # 4 PCP Ki Fung MD     Subjective:    Patient NG tube was Elevated blood sugar     Mild anemia     Cerebral atrophy     Cerebral microvascular disease     Autoantibody titer positive     Cognitive complaints     Renal insufficiency     Sepsis ([address]     Pneumonia due to infectious organism      Impression/Plan:

## 2017-11-02 NOTE — PHYSICAL THERAPY NOTE
PHYSICAL THERAPY TREATMENT NOTE - INPATIENT    Room Number: 791/255-E     Session: 1  Number of Visits to Meet Established Goals: 5    Presenting Problem: SBO, sepsis due to PNA  History related to current admission:     Pt is 65year old male admitted on TRANSLUMINAL CORONARY ANGIO*  12/2014: COLECTOMY      Comment: for bowel obstruction  No date: COLON SURGERY  5/23/03: COLONOSCOPY,DIAGNOSTIC      Comment: normal  3/16/13: COLONOSCOPY,DIAGNOSTIC      Comment: descending polyp  at 40 cm  1/8/2014: DRAIN/IN Location: [address] [place] [address] MANAGEMENT  2/1/2016: INJ PARAVERT F JNT L/S 2 LEV Right      Comment: Procedure: LUMBAR FACET INJECTION OR MEDIAL                BRANCH NERVE BLOCK;  Surgeon: Gabby Culver MD;  Location: [address] [place] [address] TRANSFORAMINAL EPIDURAL - LUMBAR;                 Surgeon: Sona Davila MD;  Location: [address] [place] [address] MANAGEMENT  3/2/2016: INJECTION, ANESTHETIC/STEROID, TRANSFORAMINAL * Right      Comment: Procedure: TRANSFORAMINAL EPIDURAL - REBECCA Procedure: LUMBAR EPIDURAL;  Surgeon:                Zuhair Rodrigues MD;  Location: [place] [address] MANAGEMENT  8/5/2015: PATIENT DOCUMENTED NOT TO HAVE EXPERIENCED ANY* Right      Comment: Procedure: LUMBAR FACET INJECTION OR MEDIAL PAIN MANAGEMENT  3/10/2016: PATIENT DOCUMENTED NOT TO HAVE EXPERIENCED ANY* Right      Comment: Procedure: TRANSFORAMINAL EPIDURAL - LUMBAR;                 Surgeon: Nomi Gomez MD;  Location: [address] [place] [address] MANAGEMENT  3/29/2016: P Karol Benavidez MD;  Location: [place] [address] MANAGEMENT  1/14/2016: PATIENT [place] PREOPERATIVE ORDER FOR IV ANT* Right      Comment: Procedure: LUMBAR FACET INJECTION OR MEDIAL                BRANCH NERVE BLOCK;  Surgeon: Tristan Barnes Standing: Fair -  Dynamic Standing: Poor +    ACTIVITY TOLERANCE  Room air  Shortness of breath  unable to obtain good pleth    AM-PAC '6-Clicks' INPATIENT SHORT FORM - BASIC MOBILITY  How much difficulty does the patient currently have. ..  -   Turning ove addressed    ASSESSMENT   Pt demos unsteady gait sans AD, anticipate gait training c RW next session. Pt continues to present with deficits of dec strength, dec balance, dec activity endurance, dec awareness of energy conservation techniques.  Pt will grabiel

## 2017-11-02 NOTE — PROGRESS NOTES
Multidisciplinary Discharge Rounds held 11/2/2017. Treatment team members present today include , , Charge Nurse,  Nurse, RT, PT and Pharmacy caring for [employer].      Other care providers present:    Patient Active Pro

## 2017-11-02 NOTE — HOME CARE LIAISON
DIAGNOSES AND PERTINENT MEDICAL HISTORY: SMALL BOWEL OBSTRUCTION, SEPSIS, PNEUMONIA    FACILITY NAME AND DC DATE: [facility] PENDING DC    BEDSIDE VISIT WITH:TNL MET WITH PATIENT AND SPOUSE AT BEDSIDE    SERVICES ORDERED: RN/PT    VERIFIED PATIENT ADDRESS, PHONE N

## 2017-11-02 NOTE — PROGRESS NOTES
[facility]  Progress Note    Yesenia Wilson Patient Status:  Inpatient    3/4/1940 MRN [id number]   [facility] 5NW-A Attending Gatito Jackson MD   [facility] Day # 4 PCP Arash Mac MD     Subjective:  Yesenia Wilson is (herniated nucleus pulposus), lumbar     Lumbar spondylosis     Muscle spasm of back     Myofascial pain syndrome     Status post coronary artery bypass graft     CAD (coronary artery disease)     Chest pain     Atrial fibrillation and flutter ([address]     SB

## 2017-11-03 VITALS
DIASTOLIC BLOOD PRESSURE: 72 MMHG | HEIGHT: 71 IN | OXYGEN SATURATION: 97 % | HEART RATE: 74 BPM | TEMPERATURE: 98 F | RESPIRATION RATE: 20 BRPM | BODY MASS INDEX: 25.79 KG/M2 | WEIGHT: 184.19 LBS | SYSTOLIC BLOOD PRESSURE: 137 MMHG

## 2017-11-03 PROBLEM — N28.9 RENAL INSUFFICIENCY: Status: RESOLVED | Noted: 2017-10-29 | Resolved: 2017-11-03

## 2017-11-03 RX ORDER — LEVOFLOXACIN 500 MG/1
500 TABLET, FILM COATED ORAL DAILY
Qty: 10 TABLET | Refills: 0 | Status: SHIPPED | OUTPATIENT
Start: 2017-11-03 | End: 2017-11-13 | Stop reason: ALTCHOICE

## 2017-11-03 RX ORDER — LEVOFLOXACIN 500 MG/1
500 TABLET, FILM COATED ORAL DAILY
Status: DISCONTINUED | OUTPATIENT
Start: 2017-11-03 | End: 2017-11-03

## 2017-11-03 RX ORDER — POTASSIUM CHLORIDE 20 MEQ/1
40 TABLET, EXTENDED RELEASE ORAL ONCE
Status: COMPLETED | OUTPATIENT
Start: 2017-11-03 | End: 2017-11-03

## 2017-11-03 RX ORDER — LEVOFLOXACIN 500 MG/1
500 TABLET, FILM COATED ORAL DAILY
Qty: 10 TABLET | Refills: 0 | Status: SHIPPED | OUTPATIENT
Start: 2017-11-03 | End: 2017-11-03

## 2017-11-03 NOTE — PROGRESS NOTES
Multidisciplinary Discharge Rounds held 11/3/2017. Treatment team members present today include , , Charge Nurse,  Nurse, RT, PT and Pharmacy caring for [employer].      Other care providers present:    Patient Active Pro

## 2017-11-03 NOTE — PROGRESS NOTES
[facility]  Progress Note    Simone Angle Patient Status:  Inpatient    3/4/1940 MRN [id number]   [facility] 5NW-A Attending Ruddy Olmos MD   [facility] Day # 5 PCP Pooja Hernandez MD     Subjective:    Patient sitting up i CPAP     Elevated blood sugar     Mild anemia     Cerebral atrophy     Cerebral microvascular disease     Autoantibody titer positive     Cognitive complaints     Pneumonia due to infectious organism      Impression:     64 y/o with pSBO  - bowel function

## 2017-11-03 NOTE — PHYSICAL THERAPY NOTE
PHYSICAL THERAPY TREATMENT NOTE - INPATIENT    Room Number: 222/040-Z     Session: 2  Number of Visits to Meet Established Goals: 5    Presenting Problem: SBO, sepsis due to PNA  History related to current admission:     Pt is 65year old male admitted on COLON SURGERY  5/23/03: Eve Jewell      Comment: normal  3/16/13: COLONOSCOPY,DIAGNOSTIC      Comment: descending polyp  at 40 cm  1/8/2014: DRAIN/INJECT LARGE JOINT/BURSA      Comment: Procedure: BURSA INJECTION;  Surgeon:                Yaya Lopez Comment: Procedure: LUMBAR FACET INJECTION OR MEDIAL                BRANCH NERVE BLOCK;  Surgeon: Augustus Benavides MD;  Location: [address] [place] [address] MANAGEMENT  8/5/2015: INJ PARAVERT F JNT L/S 3 LEV Right      Comment: Procedure: LUMBAR FAC [facility]  3/2/2016: INJECTION, ANESTHETIC/STEROID, TRANSFORAMINAL * Right      Comment: Procedure: TRANSFORAMINAL EPIDURAL - LUMBAR;                 Surgeon: Bree Matos MD;  Location: [address] [place] [address] FOR               PAIN MANAGEMENT  8/5/2015: PATIENT DOCUMENTED NOT TO HAVE EXPERIENCED ANY* Right      Comment: Procedure: LUMBAR FACET INJECTION OR MEDIAL                BRANCH NERVE BLOCK;  Surgeon: Jose E Chapman MD;  Location: [facility] Procedure: TRANSFORAMINAL EPIDURAL - LUMBAR;                 Surgeon: Anuradha Adams MD;  Location: [address] [place] [address] MANAGEMENT  3/29/2016: PATIENT DOCUMENTED NOT TO HAVE EXPERIENCED ANY* Right      Comment: Procedure: TRANSFORAMINAL EPI MANAGEMENT  1/14/2016: PATIENT [place] Peed PREOPERATIVE ORDER FOR IV ANT* Right      Comment: Procedure: LUMBAR FACET INJECTION OR MEDIAL                BRANCH NERVE BLOCK;  Surgeon: Jose E Chapman MD;  Location: [address] [place] [address] Standing: Fair -  Dynamic Standing: Fair -    ACTIVITY TOLERANCE  O2 Saturation: 94%  Room air  Shortness of breath    AM-PAC '6-Clicks' INPATIENT SHORT FORM - BASIC MOBILITY  How much difficulty does the patient currently have. ..  -   Turning over in bed questions and concerns addressed    ASSESSMENT   Pt gait trained c RW this session with no LOB noted. Pt requires cues for safe mobility and t/f.   Pt continues to present with deficits of dec strength, dec balance, dec activity endurance, dec awareness of

## 2017-11-03 NOTE — PROGRESS NOTES
[facility]  Progress Note    Nellie Masters Patient Status:  Inpatient    3/4/1940 MRN [id number]   [facility] 5NW-A Attending Rosalio Juarez MD   [facility] Day # 5 PCP Megan Bahena MD     Subjective:  Nellie Masters is strength, sensation and reflexes     throughout   Assessment:  Patient Active Problem List:     ASHD (arteriosclerotic heart disease)     Insomnia     Anxiety     Hip pain, right     Trochanteric bursitis of right hip     HNP (herniated nucleus pulposus),

## 2017-11-03 NOTE — CM/SW NOTE
11/03/17 1600   Discharge disposition   Discharged to: Home-Health   Name of Corin Proc. Bobby Reese 1 services after discharge Skilled home care   Discharge transportation Private car

## 2017-11-04 ENCOUNTER — LAB ENCOUNTER (OUTPATIENT)
Dept: LAB | Facility: HOSPITAL | Age: 77
End: 2017-11-04
Attending: INTERNAL MEDICINE
Payer: MEDICARE

## 2017-11-04 DIAGNOSIS — K56.609 SMALL BOWEL OBSTRUCTION (HCC): Primary | ICD-10-CM

## 2017-11-04 PROCEDURE — 85025 COMPLETE CBC W/AUTO DIFF WBC: CPT

## 2017-11-04 PROCEDURE — 80053 COMPREHEN METABOLIC PANEL: CPT

## 2017-11-04 PROCEDURE — 36415 COLL VENOUS BLD VENIPUNCTURE: CPT

## 2017-11-07 NOTE — CDS QUERY
Natacha Reynaga  Male, 65year old, 3/4/1940  MRN:   [id number]  CSN:   [id number] Admit Date:   10/29/2017  Bed:   504-A  Attend Prov:   Gunjan      [phone]  Present on Admission (POA)  Maliha Arana    Dear Doctor: -awaiting sputum culture   -wean O2 as able”    Mar Zithro Iv, 1st dose 10/30, 21:50  Zosyn Iv, 1st dose 10/30, 18:10   0.9 Iv bolus, 10/29, 18:56     If you have any questions, please contact Clinical :  Bhupendra Greene RN, BSN, CDS

## 2017-11-20 NOTE — DISCHARGE SUMMARY
General Medicine Discharge Summary     Patient ID:  Yesenia Wilson  65year old  3/4/1940    Admit date: 10/29/2017    Discharge date and time: 11/3/2017  4:24 PM     Attending Physician: Mamta att.  pro TO SOCIAL WORK    Operative Procedures:        Patient instructions:      Discharge Medication List as of 11/3/2017  3:49 PM    START taking these medications    levofloxacin 500 MG Oral Tab  Take 1 tablet (500 mg total) by mouth daily. , Normal, Disp-10 ta

## 2017-12-28 ENCOUNTER — HOSPITAL ENCOUNTER (OUTPATIENT)
Dept: LAB | Facility: HOSPITAL | Age: 77
Discharge: HOME | End: 2017-12-28
Attending: INTERNAL MEDICINE
Payer: MEDICARE

## 2017-12-28 ENCOUNTER — PRIOR ORIGINAL RECORDS (OUTPATIENT)
Dept: OTHER | Age: 77
End: 2017-12-28

## 2017-12-28 ENCOUNTER — HOSPITAL ENCOUNTER (OUTPATIENT)
Dept: CV DIAGNOSTICS | Facility: HOSPITAL | Age: 77
Discharge: HOME | End: 2017-12-28
Attending: INTERNAL MEDICINE
Payer: MEDICARE

## 2017-12-28 DIAGNOSIS — I25.10 DISEASE OF CARDIOVASCULAR SYSTEM: ICD-10-CM

## 2017-12-28 PROCEDURE — 36415 COLL VENOUS BLD VENIPUNCTURE: CPT | Performed by: INTERNAL MEDICINE

## 2017-12-28 PROCEDURE — 80053 COMPREHEN METABOLIC PANEL: CPT | Performed by: INTERNAL MEDICINE

## 2017-12-28 PROCEDURE — 93018 CV STRESS TEST I&R ONLY: CPT | Performed by: INTERNAL MEDICINE

## 2017-12-28 PROCEDURE — 93017 CV STRESS TEST TRACING ONLY: CPT | Performed by: INTERNAL MEDICINE

## 2017-12-28 PROCEDURE — 78452 HT MUSCLE IMAGE SPECT MULT: CPT | Performed by: INTERNAL MEDICINE

## 2017-12-28 PROCEDURE — 80061 LIPID PANEL: CPT | Performed by: INTERNAL MEDICINE

## 2018-01-15 ENCOUNTER — MYAURORA ACCOUNT LINK (OUTPATIENT)
Dept: OTHER | Age: 78
End: 2018-01-15

## 2018-01-15 ENCOUNTER — PRIOR ORIGINAL RECORDS (OUTPATIENT)
Dept: OTHER | Age: 78
End: 2018-01-15

## 2018-01-16 LAB
ALBUMIN: 3.8 G/DL
ALKALINE PHOSPHATATE(ALK PHOS): 108 IU/L
BILIRUBIN TOTAL: 1.1 MG/DL
BUN: 16 MG/DL
CALCIUM: 8.9 MG/DL
CHLORIDE: 107 MEQ/L
CHOLESTEROL, TOTAL: 77 MG/DL
CREATININE, SERUM: 0.94 MG/DL
GLUCOSE: 100 MG/DL
HDL CHOLESTEROL: 55 MG/DL
LDL CHOLESTEROL: 9 MG/DL
POTASSIUM, SERUM: 4.2 MEQ/L
PROTEIN, TOTAL: 7.6 G/DL
SGOT (AST): 36 IU/L
SGPT (ALT): 37 IU/L
SODIUM: 142 MEQ/L
TRIGLYCERIDES: 67 MG/DL

## 2018-02-01 ENCOUNTER — HOSPITAL ENCOUNTER (OUTPATIENT)
Dept: LAB | Facility: HOSPITAL | Age: 78
Discharge: HOME | End: 2018-02-01
Attending: INTERNAL MEDICINE
Payer: MEDICARE

## 2018-02-01 DIAGNOSIS — I48.91 ATRIAL FIBRILLATION (HCC): ICD-10-CM

## 2018-02-01 LAB — POC INR: 2.1 (ref 0.8–1.3)

## 2018-02-01 PROCEDURE — 85610 PROTHROMBIN TIME: CPT

## 2018-02-21 ENCOUNTER — HOSPITAL ENCOUNTER (OUTPATIENT)
Dept: LAB | Facility: HOSPITAL | Age: 78
Discharge: HOME | End: 2018-02-21
Attending: INTERNAL MEDICINE
Payer: MEDICARE

## 2018-02-21 DIAGNOSIS — I48.91 ATRIAL FIBRILLATION (HCC): ICD-10-CM

## 2018-02-21 LAB — POC INR: 1.6 (ref 0.8–1.3)

## 2018-02-21 PROCEDURE — 85610 PROTHROMBIN TIME: CPT

## 2018-03-07 ENCOUNTER — HOSPITAL ENCOUNTER (OUTPATIENT)
Dept: LAB | Facility: HOSPITAL | Age: 78
Discharge: HOME | End: 2018-03-07
Attending: INTERNAL MEDICINE
Payer: MEDICARE

## 2018-03-07 DIAGNOSIS — I48.91 ATRIAL FIBRILLATION (HCC): ICD-10-CM

## 2018-03-07 LAB — POC INR: 1.6 (ref 0.8–1.3)

## 2018-03-07 PROCEDURE — 85610 PROTHROMBIN TIME: CPT

## 2018-03-14 ENCOUNTER — HOSPITAL ENCOUNTER (OUTPATIENT)
Dept: LAB | Facility: HOSPITAL | Age: 78
Discharge: HOME | End: 2018-03-14
Attending: INTERNAL MEDICINE
Payer: MEDICARE

## 2018-03-14 DIAGNOSIS — I48.91 ATRIAL FIBRILLATION (HCC): ICD-10-CM

## 2018-03-14 LAB — POC INR: 2.4 (ref 0.8–1.3)

## 2018-03-14 PROCEDURE — 85610 PROTHROMBIN TIME: CPT

## 2018-03-28 ENCOUNTER — HOSPITAL ENCOUNTER (OUTPATIENT)
Dept: LAB | Facility: HOSPITAL | Age: 78
Discharge: HOME | End: 2018-03-28
Attending: INTERNAL MEDICINE
Payer: MEDICARE

## 2018-03-28 DIAGNOSIS — I48.91 ATRIAL FIBRILLATION (HCC): ICD-10-CM

## 2018-03-28 LAB — POC INR: 1.7 (ref 0.8–1.3)

## 2018-03-28 PROCEDURE — 85610 PROTHROMBIN TIME: CPT

## 2018-04-11 ENCOUNTER — HOSPITAL ENCOUNTER (OUTPATIENT)
Dept: LAB | Facility: HOSPITAL | Age: 78
Discharge: HOME | End: 2018-04-11
Attending: INTERNAL MEDICINE
Payer: MEDICARE

## 2018-04-11 DIAGNOSIS — I48.91 ATRIAL FIBRILLATION (HCC): ICD-10-CM

## 2018-04-11 PROCEDURE — 85610 PROTHROMBIN TIME: CPT

## 2018-04-16 PROBLEM — R76.8 AUTOANTIBODY TITER POSITIVE: Status: RESOLVED | Noted: 2017-06-20 | Resolved: 2018-04-16

## 2018-04-16 PROBLEM — G31.9 CEREBRAL ATROPHY (HCC): Status: RESOLVED | Noted: 2017-06-20 | Resolved: 2018-04-16

## 2018-04-16 PROBLEM — R73.9 ELEVATED BLOOD SUGAR: Status: RESOLVED | Noted: 2017-04-06 | Resolved: 2018-04-16

## 2018-04-16 PROBLEM — D64.9 MILD ANEMIA: Status: RESOLVED | Noted: 2017-04-06 | Resolved: 2018-04-16

## 2018-05-04 PROCEDURE — 81001 URINALYSIS AUTO W/SCOPE: CPT | Performed by: INTERNAL MEDICINE

## 2018-05-09 ENCOUNTER — HOSPITAL ENCOUNTER (OUTPATIENT)
Dept: LAB | Facility: HOSPITAL | Age: 78
Discharge: HOME | End: 2018-05-09
Attending: INTERNAL MEDICINE
Payer: MEDICARE

## 2018-05-09 DIAGNOSIS — I48.91 ATRIAL FIBRILLATION (HCC): ICD-10-CM

## 2018-05-09 PROCEDURE — 85610 PROTHROMBIN TIME: CPT

## 2018-06-09 ENCOUNTER — HOSPITAL ENCOUNTER (OUTPATIENT)
Dept: LAB | Facility: HOSPITAL | Age: 78
Discharge: HOME | End: 2018-06-09
Attending: INTERNAL MEDICINE
Payer: MEDICARE

## 2018-06-09 DIAGNOSIS — I48.91 ATRIAL FIBRILLATION (HCC): ICD-10-CM

## 2018-06-09 PROCEDURE — 36415 COLL VENOUS BLD VENIPUNCTURE: CPT

## 2018-06-09 PROCEDURE — 85610 PROTHROMBIN TIME: CPT

## 2018-07-05 ENCOUNTER — HOSPITAL ENCOUNTER (OUTPATIENT)
Dept: LAB | Facility: HOSPITAL | Age: 78
Discharge: HOME | End: 2018-07-05
Attending: INTERNAL MEDICINE
Payer: MEDICARE

## 2018-07-05 DIAGNOSIS — I48.91 ATRIAL FIBRILLATION (HCC): ICD-10-CM

## 2018-07-05 LAB — POC INR: 1.7 (ref 0.8–1.3)

## 2018-07-05 PROCEDURE — 85610 PROTHROMBIN TIME: CPT

## 2018-07-10 ENCOUNTER — HOSPITAL ENCOUNTER (OUTPATIENT)
Dept: LAB | Facility: HOSPITAL | Age: 78
Discharge: HOME | End: 2018-07-10
Attending: INTERNAL MEDICINE
Payer: MEDICARE

## 2018-07-10 DIAGNOSIS — I48.91 ATRIAL FIBRILLATION (HCC): ICD-10-CM

## 2018-07-10 LAB
INR BLD: 4.43 (ref 0.9–1.1)
POC INR: 7.2 (ref 0.8–1.3)
PSA SERPL DL<=0.01 NG/ML-MCNC: 43.5 SECONDS (ref 12.4–14.7)

## 2018-07-10 PROCEDURE — 85610 PROTHROMBIN TIME: CPT

## 2018-07-10 PROCEDURE — 36415 COLL VENOUS BLD VENIPUNCTURE: CPT

## 2018-07-17 ENCOUNTER — APPOINTMENT (OUTPATIENT)
Dept: CV DIAGNOSTICS | Facility: HOSPITAL | Age: 78
DRG: 291 | End: 2018-07-17
Attending: INTERNAL MEDICINE
Payer: MEDICARE

## 2018-07-17 ENCOUNTER — HOSPITAL ENCOUNTER (INPATIENT)
Facility: HOSPITAL | Age: 78
LOS: 8 days | Discharge: NURSING, REHAB OR LONG-TERM CARE | DRG: 291 | End: 2018-07-25
Attending: EMERGENCY MEDICINE | Admitting: INTERNAL MEDICINE
Payer: MEDICARE

## 2018-07-17 ENCOUNTER — APPOINTMENT (OUTPATIENT)
Dept: GENERAL RADIOLOGY | Facility: HOSPITAL | Age: 78
DRG: 291 | End: 2018-07-17
Attending: EMERGENCY MEDICINE
Payer: MEDICARE
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
## 2018-07-17 DIAGNOSIS — M47.816 LUMBAR SPONDYLOSIS: ICD-10-CM

## 2018-07-17 DIAGNOSIS — M79.18 MYOFASCIAL PAIN SYNDROME: ICD-10-CM

## 2018-07-17 DIAGNOSIS — I50.9 ACUTE ON CHRONIC CONGESTIVE HEART FAILURE, UNSPECIFIED HEART FAILURE TYPE (HCC): Primary | ICD-10-CM

## 2018-07-17 DIAGNOSIS — M54.16 CHRONIC RADICULAR LUMBAR PAIN: ICD-10-CM

## 2018-07-17 DIAGNOSIS — G89.29 CHRONIC RADICULAR LUMBAR PAIN: ICD-10-CM

## 2018-07-17 DIAGNOSIS — E83.42 HYPOMAGNESEMIA: ICD-10-CM

## 2018-07-17 LAB
ALBUMIN SERPL-MCNC: 2.7 G/DL (ref 3.5–4.8)
ALP LIVER SERPL-CCNC: 211 U/L (ref 45–117)
ALT SERPL-CCNC: 30 U/L (ref 17–63)
AST SERPL-CCNC: 45 U/L (ref 15–41)
BASOPHILS # BLD AUTO: 0.02 X10(3) UL (ref 0–0.1)
BASOPHILS NFR BLD AUTO: 0.3 %
BETA NATRIURETIC PEPTIDE: 498 PG/ML (ref 2–99)
BILIRUB SERPL-MCNC: 0.8 MG/DL (ref 0.1–2)
BILIRUB UR QL STRIP.AUTO: NEGATIVE
BUN BLD-MCNC: 28 MG/DL (ref 8–20)
CALCIUM BLD-MCNC: 8.2 MG/DL (ref 8.3–10.3)
CHLORIDE: 110 MMOL/L (ref 101–111)
CK: 110 IU/L (ref 39–308)
CO2: 24 MMOL/L (ref 22–32)
CREAT BLD-MCNC: 1.24 MG/DL (ref 0.7–1.3)
EOSINOPHIL # BLD AUTO: 0.07 X10(3) UL (ref 0–0.3)
EOSINOPHIL NFR BLD AUTO: 1.2 %
ERYTHROCYTE [DISTWIDTH] IN BLOOD BY AUTOMATED COUNT: 17 % (ref 11.5–16)
GLUCOSE BLD-MCNC: 81 MG/DL (ref 70–99)
GLUCOSE UR STRIP.AUTO-MCNC: NEGATIVE MG/DL
HAV IGM SER QL: 1.2 MG/DL (ref 1.8–2.5)
HCT VFR BLD AUTO: 38.9 % (ref 37–53)
HGB BLD-MCNC: 12.6 G/DL (ref 13–17)
HYALINE CASTS #/AREA URNS AUTO: PRESENT /LPF
IMMATURE GRANULOCYTE COUNT: 0.04 X10(3) UL (ref 0–1)
IMMATURE GRANULOCYTE RATIO %: 0.7 %
INR BLD: 4.49 (ref 0.9–1.1)
KETONES UR STRIP.AUTO-MCNC: NEGATIVE MG/DL
LEUKOCYTE ESTERASE UR QL STRIP.AUTO: NEGATIVE
LYMPHOCYTES # BLD AUTO: 0.6 X10(3) UL (ref 0.9–4)
LYMPHOCYTES NFR BLD AUTO: 10.3 %
M PROTEIN MFR SERPL ELPH: 6.5 G/DL (ref 6.1–8.3)
MCH RBC QN AUTO: 32 PG (ref 27–33.2)
MCHC RBC AUTO-ENTMCNC: 32.4 G/DL (ref 31–37)
MCV RBC AUTO: 98.7 FL (ref 80–99)
MONOCYTES # BLD AUTO: 0.33 X10(3) UL (ref 0.1–1)
MONOCYTES NFR BLD AUTO: 5.7 %
NEUTROPHIL ABS PRELIM: 4.76 X10 (3) UL (ref 1.3–6.7)
NEUTROPHILS # BLD AUTO: 4.76 X10(3) UL (ref 1.3–6.7)
NEUTROPHILS NFR BLD AUTO: 81.8 %
NITRITE UR QL STRIP.AUTO: NEGATIVE
PH UR STRIP.AUTO: 5 [PH] (ref 4.5–8)
PHOSPHATE SERPL-MCNC: 2.5 MG/DL (ref 2.5–4.9)
PLATELET # BLD AUTO: 183 10(3)UL (ref 150–450)
POTASSIUM SERPL-SCNC: 4.5 MMOL/L (ref 3.6–5.1)
PROT UR STRIP.AUTO-MCNC: 100 MG/DL
PSA SERPL DL<=0.01 NG/ML-MCNC: 44 SECONDS (ref 12.4–14.7)
RBC # BLD AUTO: 3.94 X10(6)UL (ref 3.8–5.8)
RBC UR QL AUTO: NEGATIVE
RED CELL DISTRIBUTION WIDTH-SD: 61.8 FL (ref 35.1–46.3)
SODIUM SERPL-SCNC: 143 MMOL/L (ref 136–144)
SP GR UR STRIP.AUTO: 1.02 (ref 1–1.03)
UROBILINOGEN UR STRIP.AUTO-MCNC: 4 MG/DL
WBC # BLD AUTO: 5.8 X10(3) UL (ref 4–13)

## 2018-07-17 PROCEDURE — 93306 TTE W/DOPPLER COMPLETE: CPT | Performed by: INTERNAL MEDICINE

## 2018-07-17 PROCEDURE — 84100 ASSAY OF PHOSPHORUS: CPT | Performed by: EMERGENCY MEDICINE

## 2018-07-17 PROCEDURE — 93005 ELECTROCARDIOGRAM TRACING: CPT

## 2018-07-17 PROCEDURE — 96375 TX/PRO/DX INJ NEW DRUG ADDON: CPT

## 2018-07-17 PROCEDURE — 85025 COMPLETE CBC W/AUTO DIFF WBC: CPT | Performed by: EMERGENCY MEDICINE

## 2018-07-17 PROCEDURE — 81001 URINALYSIS AUTO W/SCOPE: CPT | Performed by: EMERGENCY MEDICINE

## 2018-07-17 PROCEDURE — 99285 EMERGENCY DEPT VISIT HI MDM: CPT

## 2018-07-17 PROCEDURE — 83735 ASSAY OF MAGNESIUM: CPT | Performed by: EMERGENCY MEDICINE

## 2018-07-17 PROCEDURE — 93010 ELECTROCARDIOGRAM REPORT: CPT

## 2018-07-17 PROCEDURE — 96367 TX/PROPH/DG ADDL SEQ IV INF: CPT

## 2018-07-17 PROCEDURE — 71045 X-RAY EXAM CHEST 1 VIEW: CPT | Performed by: EMERGENCY MEDICINE

## 2018-07-17 PROCEDURE — 96361 HYDRATE IV INFUSION ADD-ON: CPT

## 2018-07-17 PROCEDURE — 83880 ASSAY OF NATRIURETIC PEPTIDE: CPT | Performed by: EMERGENCY MEDICINE

## 2018-07-17 PROCEDURE — 85610 PROTHROMBIN TIME: CPT | Performed by: EMERGENCY MEDICINE

## 2018-07-17 PROCEDURE — 96365 THER/PROPH/DIAG IV INF INIT: CPT

## 2018-07-17 PROCEDURE — 80053 COMPREHEN METABOLIC PANEL: CPT | Performed by: EMERGENCY MEDICINE

## 2018-07-17 PROCEDURE — 82550 ASSAY OF CK (CPK): CPT | Performed by: EMERGENCY MEDICINE

## 2018-07-17 RX ORDER — HYDROCODONE BITARTRATE AND ACETAMINOPHEN 5; 325 MG/1; MG/1
1 TABLET ORAL EVERY 8 HOURS PRN
Status: DISCONTINUED | OUTPATIENT
Start: 2018-07-17 | End: 2018-07-25

## 2018-07-17 RX ORDER — FUROSEMIDE 10 MG/ML
40 INJECTION INTRAMUSCULAR; INTRAVENOUS
Status: DISCONTINUED | OUTPATIENT
Start: 2018-07-17 | End: 2018-07-18

## 2018-07-17 RX ORDER — MEMANTINE HYDROCHLORIDE 10 MG/1
10 TABLET ORAL 2 TIMES DAILY
Status: DISCONTINUED | OUTPATIENT
Start: 2018-07-17 | End: 2018-07-25

## 2018-07-17 RX ORDER — ESCITALOPRAM OXALATE 10 MG/1
10 TABLET ORAL DAILY
Status: DISCONTINUED | OUTPATIENT
Start: 2018-07-17 | End: 2018-07-25

## 2018-07-17 RX ORDER — FUROSEMIDE 10 MG/ML
40 INJECTION INTRAMUSCULAR; INTRAVENOUS ONCE
Status: COMPLETED | OUTPATIENT
Start: 2018-07-17 | End: 2018-07-17

## 2018-07-17 RX ORDER — METOPROLOL SUCCINATE 25 MG/1
25 TABLET, EXTENDED RELEASE ORAL
Status: DISCONTINUED | OUTPATIENT
Start: 2018-07-17 | End: 2018-07-25

## 2018-07-17 RX ORDER — DONEPEZIL HYDROCHLORIDE 10 MG/1
10 TABLET, FILM COATED ORAL NIGHTLY
Status: DISCONTINUED | OUTPATIENT
Start: 2018-07-17 | End: 2018-07-25

## 2018-07-17 RX ORDER — ASPIRIN 81 MG/1
81 TABLET ORAL DAILY
Status: DISCONTINUED | OUTPATIENT
Start: 2018-07-17 | End: 2018-07-25

## 2018-07-17 RX ORDER — MAGNESIUM SULFATE HEPTAHYDRATE 40 MG/ML
2 INJECTION, SOLUTION INTRAVENOUS ONCE
Status: COMPLETED | OUTPATIENT
Start: 2018-07-17 | End: 2018-07-17

## 2018-07-17 RX ORDER — PANTOPRAZOLE SODIUM 40 MG/1
40 TABLET, DELAYED RELEASE ORAL
Status: DISCONTINUED | OUTPATIENT
Start: 2018-07-18 | End: 2018-07-25

## 2018-07-17 NOTE — H&P
[facility] Hospitalist History and Physical      Patient presents with:  Fall (musculoskeletal, neurologic)       PCP: Letty Garcia MD      History of Present Illness: Patient is a 77year old male with PMH sig for Alzheimer's, dementia, afib, diastolic CHF, PAIN MANAGEMENT  1/8/2014: FLUOROSCOPIC GUIDANCE NEEDLE PLACEMENT      Comment: Procedure: LUMBAR FACET INJECTION OR MEDIAL                BRANCH NERVE BLOCK;  Surgeon: Eliazar Ambrosio MD;  Location: [facility] FOR PAIN MANAGEMENT  No date Surgeon: Lina Brasher MD;  Location: [address] [place] [address] MANAGEMENT  8/20/2015: INJECTION, ANESTHETIC/STEROID, TRANSFORAMINAL * N/A      Comment: Procedure: TRANSFORAMINAL EPIDURAL - LUMBAR;                 Surgeon: Dany Villalobos MD; TRANSFORAMINAL * Right      Comment: Procedure: TRANSFORAMINAL EPIDURAL - LUMBAR;                 Surgeon: Yfn Briscoe MD;  Location: [address] [place] [address] MANAGEMENT  3/29/2016: INJECTION, ANESTHETIC/STEROID, TRANSFORAMINAL * Right      C NOT TO HAVE EXPERIENCED ANY* N/A      Comment: Procedure: TRANSFORAMINAL EPIDURAL - LUMBAR;                 Surgeon: Jonna Grewal MD;  Location: [address] [place] [address] MANAGEMENT  9/4/2015: PATIENT DOCUMENTED NOT TO HAVE EXPERIENCED ANY* R MD;  Location: [address] [place] [address] MANAGEMENT  1/8/2014: PATIENT [place] PREOPERATIVE ORDER FOR IV ANT*      Comment: Procedure: LUMBAR FACET INJECTION OR MEDIAL                BRANCH NERVE BLOCK;  Surgeon: Omayra العلي Comment: Procedure: LUMBAR FACET INJECTION OR MEDIAL                BRANCH NERVE BLOCK;  Surgeon: Bree Matos MD;  Location: [address] [place] [address] MANAGEMENT  3/2/2016: PATIENT [place] PREOPERATIVE ORDER FOR IV ANT* Right      Comment: Pr cervical or supraclavicular lymph adenopathy, thyroid: no enlargment/tenderness/nodules appreciated   Lungs:   Clear to auscultation bilaterally. Normal effort   Chest wall:  No tenderness or deformity.    Heart:  Regular rate and rhythm, S1, S2 normal, no Approved by: Damian Morrison MD               Assessment/Plan:     Fall  - unclear circumstances surrounding fall. CK ok. CT head ok.   PT eval.   - Pt is on chronic norco and also prn ambien which is NOT recommended given his age and ho dementia   - will

## 2018-07-17 NOTE — ED PROVIDER NOTES
Patient Seen in: [facility] Emergency Department    History   Patient presents with:  Fall (musculoskeletal, neurologic)    Stated Complaint: slip out of wheelchair, on the floor for a few hours.     HPI    Patient is a 65-year-old male comes emergen (CORONARY)      Comment: one vessel  No date: APPENDECTOMY  6/2014: CABG      Comment: 4 bypass  No date: CATH PERCUTANEOUS  TRANSLUMINAL CORONARY ANGIO*  12/2014: COLECTOMY      Comment: for bowel obstruction  No date: COLON SURGERY  5/23/03: COLONOSCOPY, Procedure: LUMBAR FACET INJECTION OR MEDIAL                BRANCH NERVE BLOCK;  Surgeon: Chava Márquez MD;  Location: [address] [place] [address] MANAGEMENT  2/1/2016: Marci Sher L/S 2 [facility] Right      Comment: Procedure: LUMBAR FACET INJECT [place] FOR PAIN MANAGEMENT  9/4/2015: INJECTION, ANESTHETIC/STEROID, TRANSFORAMINAL * Right      Comment: Procedure: TRANSFORAMINAL EPIDURAL - LUMBAR;                 Surgeon: Sona Davila MD;  Location: [address] [place] [address] Location: [facility] FOR PAIN MANAGEMENT  6/17/2015: PATIENT DOCUMENTED NOT TO HAVE EXPERIENCED ANY* N/A      Comment: Procedure: LUMBAR EPIDURAL;  Surgeon:                Sneha Fernandez MD;  Location: [address] [place] [address] Dr               PAIN MANAGEMENT  8/5/2 TRANSFORAMINAL EPIDURAL - LUMBAR;                 Surgeon: Augustus Benavides MD;  Location: [address] [place] [address] MANAGEMENT  3/10/2016: PATIENT DOCUMENTED NOT TO HAVE EXPERIENCED ANY* Right      Comment: Procedure: TRANSFORAMINAL EPIDURAL - LUM MANAGEMENT  10/2/2015: PATIENT [place] PREOPERATIVE ORDER FOR IV ANT* N/A      Comment: Procedure: LUMBAR EPIDURAL;  Surgeon:                Bree Matos MD;  Location: [place] [address] MANAGEMENT  1/14/2016: PATIENT Tere Sers signs reviewed. All other systems reviewed and negative except as noted above.     Physical Exam   ED Triage Vitals  BP: 153/89 [07/17/18 0219]  Pulse: 54 [07/17/18 0214]  Resp: 17 [07/17/18 0214]  Temp: 97.8 °F (36.6 °C) [07/17/18 0214]  Temp src: Tem CULTURE REFLEX - Abnormal; Notable for the following:        Result Value    Urine Color Eugenie (*)     Clarity Urine Hazy (*)     Protein Urine 100  (*)     Urobilinogen Urine 4.0 (*)     Bacteria Urine Rare (*)     Hyaline Casts Present (*)     Mucous Sheryn Chatham 55  Rhythm: Sinus Rhythm  Reading: No acute changes.   Left axis deviation         Chest x-ray shows pulmonary cephalization with possible pneumonia right base  ED Course as of Jul 17 2862  ------------------------------------------------------------  Medic

## 2018-07-17 NOTE — CONSULTS
[facility]  Cardiology Consultation    Burton Mixon Patient Status:  Inpatient    3/4/1940 MRN [id number]   [facility] 2NE-A Attending Blanca Rea MD   Hosp Day # 0 PCP Roxana Blanco MD     Reason for Consu cm  1/8/2014: DRAIN/INJECT LARGE JOINT/BURSA      Comment: Procedure: BURSA INJECTION;  Surgeon:                Marylee Parry, MD;  Location: [place] [address] MANAGEMENT  1/8/2014: FLUOROSCOPIC GUIDANCE NEEDLE PLACEMENT      Comment: [facility] [place] MANAGEMENT  8/5/2015: INJ PARAVERT F JNT L/S 3 LEV Right      Comment: Procedure: LUMBAR FACET INJECTION OR MEDIAL                BRANCH NERVE BLOCK;  Surgeon: Yfn Briscoe MD;  Location: [address] [place] [address] TRANSFORAMINAL EPIDURAL - LUMBAR;                 Surgeon: Wilder Vera MD;  Location: [address] [place] [address] MANAGEMENT  3/10/2016: INJECTION, ANESTHETIC/STEROID, TRANSFORAMINAL * Right      Comment: Procedure: TRANSFORAMINAL EPIDURAL - LUM FACET INJECTION OR MEDIAL                BRANCH NERVE BLOCK;  Surgeon: Gabby Culver MD;  Location: [address] [place] [address] MANAGEMENT  8/20/2015: PATIENT DOCUMENTED NOT TO HAVE EXPERIENCED ANY* N/A      Comment: Procedure: TRANSFORAMINAL EPID PAIN MANAGEMENT  3/29/2016: PATIENT DOCUMENTED NOT TO HAVE EXPERIENCED ANY* Right      Comment: Procedure: TRANSFORAMINAL EPIDURAL - LUMBAR;                 Surgeon: Aurea Jaquez MD;  Location: [address] [place] [address] MANAGEMENT  1/8/2014: PA BRANCH NERVE BLOCK;  Surgeon: Latonia Lincoln MD;  Location: [address] [place] [address] MANAGEMENT  2/1/2016: PATIENT [place] PREOPERATIVE ORDER FOR IV ANT* Right      Comment: Procedure: LUMBAR FACET INJECTION OR MEDIAL                BRANCH NERV Sodium (PROTONIX) EC tab 40 mg, 40 mg, Oral, QAM AC    Review of Systems:  Unreliable due to underlying dementia and thus unobtainable.     /58 (BP Location: Right arm)   Pulse 56   Temp 98.1 °F (36.7 °C) (Oral)   Resp 15   Wt 216 lb (98 kg)   SpO2 96 Bilateral pleural effusions with bilateral lower zone airspace disease. Dictated by: Tanvir Castillo MD on 7/17/2018 at 8:10       Exercise NST 12/28/2017: IMPRESSION:   Abnormal rest/stress gated SPECT myocardial perfusion scan.  There is a small fixed api

## 2018-07-17 NOTE — ED NOTES
Pt changed into clean dry warm blankets under and over for comfort. Dry diaper and positioned to comfort. Friend to home, wife updated by phone with plan of care. All belongings bagged and labeled.

## 2018-07-17 NOTE — ED INITIAL ASSESSMENT (HPI)
Pt lives alone with his wife, he slipped and fell on the floor and was on the floor for a few hours. Denies neck or back pain. Pt denies abdominal pain but is c/o urinary leaking and \"swelling\". Denies chest pain.

## 2018-07-17 NOTE — PROGRESS NOTES
Pharmacy Dosing Service: Warfarin (Coumadin)    Haritha Mancilla is a 77year old male for whom pharmacy has been consulted to dose warfarin (COUMADIN) for Prophylaxis of venous thrombosis by Dr. Dalia Landon. Based on this indication, goal INR is 2-3.

## 2018-07-17 NOTE — CONSULTS
Pharmacy Dosing Service: Warfarin (Coumadin)    Vinny Herbert is a 77year old male for whom pharmacy has been consulted to dose warfarin (COUMADIN) for Prophylaxis of venous thrombosis by Dr. Rosemarie Lopez. Based on this indication, goal INR is 2-3.

## 2018-07-17 NOTE — HISTORICAL OFFICE NOTE
Jordyn Apolinar  : 1940  ACCOUNT:  [id number]  [id number]  PCP: Dr. Roxana Blanco     TODAY'S DATE: 01/15/2018  DICTATED BY:  [Dr. Katharine Martinez: [Followup of .  CAD - Native vessel, Followup of Orthostatic hypotension and Foll ]    CONS: well developed, well nourished. WEIGHT: BMI parameters reviewed and discussed. HEAD/FACE: no trauma and normocephalic. EYES: conjunctivae not injected and no xanthelasma. ENT: mucosa pink and moist. NECK: jugular venous pressure not elevated.  RE 01/16/17 Melatonin             5MG       1 capsule by mouth at bedtime            08/24/16 Norco                 5-325MG   as needed                                11/10/15 Pantoprazole Sodium   40MG      once daily                               05/09/14

## 2018-07-17 NOTE — PROGRESS NOTES
Dr. Gann Read to see patient  Placed a follow up text page w/c default to dr. Moriah Whitman  Needs further orders-re medical mngt

## 2018-07-17 NOTE — PROGRESS NOTES
PATIENT TO FLOOR FROM ER. PATIENT ALERT AND ORIENTED X4. HISTORY OF DEMENTIA. SIGNIFICANT SHORT TERM MEMORY LOSS NOTED. PATIENT UNABLE TO REMEMBER FALLING AT HOME OR HOW HE ENDED UP ON THE FLOOR. DENIES ANY ACUTE PAIN AT THIS TIME.   C/O CHRONIC LOW BA

## 2018-07-18 ENCOUNTER — APPOINTMENT (OUTPATIENT)
Dept: CT IMAGING | Facility: HOSPITAL | Age: 78
DRG: 291 | End: 2018-07-18
Attending: HOSPITALIST
Payer: MEDICARE

## 2018-07-18 LAB
BASOPHILS # BLD AUTO: 0.01 X10(3) UL (ref 0–0.1)
BASOPHILS NFR BLD AUTO: 0.2 %
BUN BLD-MCNC: 25 MG/DL (ref 8–20)
CALCIUM BLD-MCNC: 8 MG/DL (ref 8.3–10.3)
CHLORIDE: 109 MMOL/L (ref 101–111)
CO2: 25 MMOL/L (ref 22–32)
CREAT BLD-MCNC: 1.27 MG/DL (ref 0.7–1.3)
EOSINOPHIL # BLD AUTO: 0.13 X10(3) UL (ref 0–0.3)
EOSINOPHIL NFR BLD AUTO: 2.3 %
ERYTHROCYTE [DISTWIDTH] IN BLOOD BY AUTOMATED COUNT: 16.6 % (ref 11.5–16)
GLUCOSE BLD-MCNC: 129 MG/DL (ref 70–99)
HAV IGM SER QL: 1.6 MG/DL (ref 1.8–2.5)
HCT VFR BLD AUTO: 35.1 % (ref 37–53)
HGB BLD-MCNC: 11.5 G/DL (ref 13–17)
IMMATURE GRANULOCYTE COUNT: 0.02 X10(3) UL (ref 0–1)
IMMATURE GRANULOCYTE RATIO %: 0.4 %
INR BLD: 4.63 (ref 0.9–1.1)
LYMPHOCYTES # BLD AUTO: 0.65 X10(3) UL (ref 0.9–4)
LYMPHOCYTES NFR BLD AUTO: 11.6 %
MCH RBC QN AUTO: 31.9 PG (ref 27–33.2)
MCHC RBC AUTO-ENTMCNC: 32.8 G/DL (ref 31–37)
MCV RBC AUTO: 97.2 FL (ref 80–99)
MONOCYTES # BLD AUTO: 0.37 X10(3) UL (ref 0.1–1)
MONOCYTES NFR BLD AUTO: 6.6 %
NEUTROPHIL ABS PRELIM: 4.4 X10 (3) UL (ref 1.3–6.7)
NEUTROPHILS # BLD AUTO: 4.4 X10(3) UL (ref 1.3–6.7)
NEUTROPHILS NFR BLD AUTO: 78.9 %
PLATELET # BLD AUTO: 162 10(3)UL (ref 150–450)
POTASSIUM SERPL-SCNC: 4 MMOL/L (ref 3.6–5.1)
PSA SERPL DL<=0.01 NG/ML-MCNC: 45 SECONDS (ref 12.4–14.7)
RBC # BLD AUTO: 3.61 X10(6)UL (ref 3.8–5.8)
RED CELL DISTRIBUTION WIDTH-SD: 58.6 FL (ref 35.1–46.3)
SODIUM SERPL-SCNC: 143 MMOL/L (ref 136–144)
WBC # BLD AUTO: 5.6 X10(3) UL (ref 4–13)

## 2018-07-18 PROCEDURE — 70450 CT HEAD/BRAIN W/O DYE: CPT | Performed by: HOSPITALIST

## 2018-07-18 PROCEDURE — 85610 PROTHROMBIN TIME: CPT | Performed by: INTERNAL MEDICINE

## 2018-07-18 PROCEDURE — 83735 ASSAY OF MAGNESIUM: CPT | Performed by: NURSE PRACTITIONER

## 2018-07-18 PROCEDURE — 80048 BASIC METABOLIC PNL TOTAL CA: CPT | Performed by: HOSPITALIST

## 2018-07-18 PROCEDURE — 97162 PT EVAL MOD COMPLEX 30 MIN: CPT

## 2018-07-18 PROCEDURE — 85025 COMPLETE CBC W/AUTO DIFF WBC: CPT | Performed by: HOSPITALIST

## 2018-07-18 PROCEDURE — 97116 GAIT TRAINING THERAPY: CPT

## 2018-07-18 RX ORDER — MAGNESIUM OXIDE 400 MG (241.3 MG MAGNESIUM) TABLET
400 TABLET ONCE
Status: COMPLETED | OUTPATIENT
Start: 2018-07-18 | End: 2018-07-18

## 2018-07-18 RX ORDER — FUROSEMIDE 10 MG/ML
40 INJECTION INTRAMUSCULAR; INTRAVENOUS 3 TIMES DAILY
Status: DISCONTINUED | OUTPATIENT
Start: 2018-07-18 | End: 2018-07-19

## 2018-07-18 NOTE — PROGRESS NOTES
[facility]  Cardiology Progress Note    Subjective:  No chest pain or shortness of breath. Lying mostly flat in bed without dyspnea/orthopnea, but does have marked JVD up to and past jawline. Legs/scrotal area remain markedly swollen.     Objective:  Right ventricle: The cavity size was mildly to moderately increased. 6.  Right atrium: The atrium was markedly dilated. 7.  Tricuspid valve: There was moderate-severe regurgitation.   8.  Pulmonary arteries: Systolic pressure could not be accurately nena increase IV lasix to tid and if renal function stable will switch to IV lasix drip tomorrow.     Nakia Pham MD

## 2018-07-18 NOTE — PLAN OF CARE
CARDIOVASCULAR - ADULT    • Absence of cardiac arrhythmias or at baseline Progressing        Patient/Family Goals    • Patient/Family Long Term Goal Progressing    • Patient/Family Short Term Goal Progressing        RESPIRATORY - ADULT    • Achieves optima

## 2018-07-18 NOTE — PROGRESS NOTES
[facility] Dosing Service  Warfarin (Coumadin) Subsequent Dosing    Sharman Favre is a 77year old male for whom pharmacy has been dosing warfarin (Coumadin).  Goal INR is 2-3    Recent Labs   Lab  07/17/18   0224  07/18/18   0551   INR  4.49*  4.63*

## 2018-07-18 NOTE — PROGRESS NOTES
[facility] hospitalist Daily note  Patient was seen/examined 7/18/18    S; patient not sure how he fell, just remembers ending up  On the ground  Denies headache, no chest pain, no SOb, no nausea    Medications in EPIC    PE vitals in EPIC  Gen: awake, alert, no

## 2018-07-18 NOTE — PROGRESS NOTES
Dr. Kennedy Shah notified w/ ct brain result  Neuro consult dr. Braulio Jones MD answering service notified

## 2018-07-18 NOTE — CONSULTS
The patient is known to me. I follow him in the outpatient setting. He has a history of Alzheimer's disease. Previous imaging of the brain shows microvascular disease. Microvascular disease is secondary to his known risk factors for atherosclerosis.

## 2018-07-18 NOTE — PHYSICAL THERAPY NOTE
PHYSICAL THERAPY EVALUATION - INPATIENT     Room Number: 5416/5379-D  Evaluation Date: 7/18/2018  Type of Evaluation: Initial  Physician Order: PT Eval and Treat    Presenting Problem: Acute on chronic congestive heart failure  Reason for Therapy: Mobili SPLIT NIGHT 5-1-16    AHI 6 SaO2 gary 92 % CPAP 8    • Unspecified sleep apnea    • Visual impairment        Past Surgical History  Past Surgical History:  No date: ADENOIDECTOMY      Comment: palate surgery,deviated septum  No date: ANGIOPLASTY (CORONARY Right      Comment: Procedure: LUMBAR FACET INJECTION OR MEDIAL                BRANCH NERVE BLOCK;  Surgeon: Lilo Lozano MD;  Location: [address] [place] [address] MANAGEMENT  1/14/2016: INJ PARAVERT F JNT L/S 2 LEV Right      Comment: Procedure Location: [address] [place] [address] MANAGEMENT  8/20/2015: INJECTION, ANESTHETIC/STEROID, TRANSFORAMINAL * N/A      Comment: Procedure: TRANSFORAMINAL EPIDURAL - LUMBAR;                 Surgeon: Nikos Del Real MD;  Location: [facility] [place] [place] SURGICAL HISTORY      Comment: surgery for bowel obstruction  1/8/2014: PATIENT DOCUMENTED NOT TO HAVE EXPERIENCED ANY*      Comment: Procedure: LUMBAR FACET INJECTION OR MEDIAL                BRANCH NERVE BLOCK;  Surgeon: Bandar Marc Comment: Procedure: LUMBAR FACET INJECTION OR MEDIAL                BRANCH NERVE BLOCK;  Surgeon: Lina Brasher MD;  Location: [address] [place] [address] MANAGEMENT  3/2/2016: PATIENT DOCUMENTED NOT TO HAVE EXPERIENCED ANY* Right      Comment: Pr Location: [address] [place] [address] MANAGEMENT  9/18/2015: PATIENT [place] PREOPERATIVE ORDER FOR IV ANT* N/A      Comment: Procedure: LUMBAR EPIDURAL;  Surgeon:                Yfn Briscoe MD;  Location[facility] [address] [facility] Spouse  Drives: No  Patient Owned Equipment: Rolling walker;Cane  Patient Regularly Uses: Glasses    Prior Level of [place]: The pt reports that he typically ambulates without an assistive device.   The pt's spouse reports that the pt does not use an a rest 70 bpm    AM-PAC '6-Clicks' INPATIENT SHORT FORM - BASIC MOBILITY  How much difficulty does the patient currently have. ..  -   Turning over in bed (including adjusting bedclothes, sheets and blankets)?: None   -   Sitting down on and standing up from chronic congestive heart failure.  In this PT evaluation, the patient presents with the following impairments: 1) impaired static and dynamic sitting and standing balance, 2) impaired cardiopulmonary endurance, 3) impaired muscular endurance, and 4) impaire

## 2018-07-19 LAB
ANION GAP SERPL CALC-SCNC: 7 MMOL/L (ref 0–18)
ATRIAL RATE: 214 BPM
BUN BLD-MCNC: 20 MG/DL (ref 8–20)
BUN/CREAT SERPL: 17.5 (ref 10–20)
CALCIUM BLD-MCNC: 7.9 MG/DL (ref 8.3–10.3)
CHLORIDE: 104 MMOL/L (ref 101–111)
CO2: 30 MMOL/L (ref 22–32)
CREAT BLD-MCNC: 1.14 MG/DL (ref 0.7–1.3)
GLUCOSE BLD-MCNC: 82 MG/DL (ref 70–99)
HAV IGM SER QL: 1.4 MG/DL (ref 1.8–2.5)
INR BLD: 4.55 (ref 0.9–1.1)
OSMOLALITY SERPL CALC.SUM OF ELEC: 294 MOSM/KG (ref 275–295)
POTASSIUM SERPL-SCNC: 3.9 MMOL/L (ref 3.6–5.1)
PSA SERPL DL<=0.01 NG/ML-MCNC: 44.4 SECONDS (ref 12.4–14.7)
Q-T INTERVAL: 582 MS
QRS DURATION: 98 MS
QTC CALCULATION (BEZET): 556 MS
R AXIS: -44 DEGREES
SODIUM SERPL-SCNC: 141 MMOL/L (ref 136–144)
T AXIS: 55 DEGREES
VENTRICULAR RATE: 55 BPM

## 2018-07-19 PROCEDURE — 83735 ASSAY OF MAGNESIUM: CPT | Performed by: NURSE PRACTITIONER

## 2018-07-19 PROCEDURE — 80048 BASIC METABOLIC PNL TOTAL CA: CPT | Performed by: NURSE PRACTITIONER

## 2018-07-19 PROCEDURE — 85610 PROTHROMBIN TIME: CPT | Performed by: INTERNAL MEDICINE

## 2018-07-19 NOTE — CONSULTS
[facility]  Neurology  Hospital Consultation Report    Nellie Masters Patient Status:  Inpatient    3/4/1940 MRN [id number]   [facility] 2NE-A Attending Rachelle Pillai MD   [address] Day [address] 2 PCP Megan Bahena MD   Date of Admis gary 92 % CPAP 8    • Unspecified sleep apnea    • Visual impairment      Past Surgical History  Past Surgical History:  No date: ADENOIDECTOMY      Comment: palate surgery,deviated septum  No date: ANGIOPLASTY (CORONARY)      Comment: one vessel  No date FACET INJECTION OR MEDIAL                BRANCH NERVE BLOCK;  Surgeon: Lorelei Goode MD;  Location: [address] [place] [address] MANAGEMENT  1/14/2016: INJ PARAVERT F JNT L/S 2 LEV Right      Comment: Procedure: LUMBAR FACET INJECTION OR MEDIAL PAIN MANAGEMENT  8/20/2015: INJECTION, ANESTHETIC/STEROID, TRANSFORAMINAL * N/A      Comment: Procedure: TRANSFORAMINAL EPIDURAL - LUMBAR;                 Surgeon: Kenton Morton MD;  Location: [address] [place] [address] MANAGEMENT  9/4/2015: IN bowel obstruction  1/8/2014: PATIENT DOCUMENTED NOT TO HAVE EXPERIENCED ANY*      Comment: Procedure: LUMBAR FACET INJECTION OR MEDIAL                BRANCH NERVE BLOCK;  Surgeon: Silviano Arvizu MD;  Location: [address] [place] [address] MEDIAL                BRANCH NERVE BLOCK;  Surgeon: Latonia Lincoln MD;  Location: [address] [place] [address] MANAGEMENT  3/2/2016: PATIENT DOCUMENTED NOT TO HAVE EXPERIENCED ANY* Right      Comment: Procedure: TRANSFORAMINAL EPIDURAL - LUMBAR; MANAGEMENT  9/18/2015: PATIENT [place] PREOPERATIVE ORDER FOR IV ANT* N/A      Comment: Procedure: LUMBAR EPIDURAL;  Surgeon:                Latonia Lincoln MD;  Location: [address] [place] [address] FOR                PAIN MANAGEMENT  10/2/2015: PATIENT Melissa Byrne Smokeless tobacco: Never Used                      Alcohol use: Yes           0.0 oz/week     Comment: 0-2 per month         Current Medications:    Current Facility-Administered Medications:  furosemide (LASIX) 250 mg in sodium chloride 0 denies nasal congestion, sinus pain or sore throat; hearing loss negative  LUNGS: denies shortness of breath with exertion, coughing or wheezing  CARDIOVASCULAR: denies chest pain on exertion; no palpitations  GI: denies abdominal pain, denies heartburn, d 162.0 07/18/2018   CREATSERUM 1.14 07/19/2018   BUN 20 07/19/2018    07/19/2018   K 3.9 07/19/2018    07/19/2018   CO2 30.0 07/19/2018   GLU 82 07/19/2018   CA 7.9 (L) 07/19/2018   ALB 2.7 (L) 07/17/2018   ALKPHO 211 (H) 07/17/2018   TP 6.5 07/ reduction for stroke is recommended to continue        I've explained these findings and impressions to the patient. All questions were answered. Understanding of the information was demonstrated.     Further recommendations and impressions will follow repe

## 2018-07-19 NOTE — CONSULTS
Pharmacy Dosing Service: Warfarin (Coumadin)  Mark Escobedo is a 77year old male for whom pharmacy has been dosing warfarin (Coumadin).  Goal INR is 2-3    Recent Labs   Lab  07/17/18   0224  07/18/18   0551  07/19/18   0555   INR  4.49*  4.63*  4.55*

## 2018-07-19 NOTE — PLAN OF CARE
CARDIOVASCULAR - ADULT    • Absence of cardiac arrhythmias or at baseline Progressing        RESPIRATORY - ADULT    • Achieves optimal ventilation and oxygenation Progressing          Up in chair,awake, forgetful  Denies sob, lower back pain better with No

## 2018-07-19 NOTE — PLAN OF CARE
CARDIOVASCULAR - ADULT    • Absence of cardiac arrhythmias or at baseline Progressing        RESPIRATORY - ADULT    • Achieves optimal ventilation and oxygenation Progressing              NURSING ADMISSION NOTE      Patient admitted via [facility]

## 2018-07-19 NOTE — PLAN OF CARE
CARDIOVASCULAR - ADULT    • Absence of cardiac arrhythmias or at baseline Progressing        Impaired Functional Mobility    • Achieve highest/safest level of mobility/gait Progressing        RESPIRATORY - ADULT    • Achieves optimal ventilation and oxygen

## 2018-07-19 NOTE — PROGRESS NOTES
[facility]  Cardiology Progress Note    Subjective:  No chest pain or shortness of breath. Verbalizes he has been urinating \" a lot \".     Objective:  BP (!) 161/74 (BP Location: Left arm)   Pulse 64   Temp 98.3 °F (36.8 °C) (Oral)   Resp 18   Wt 20  Right atrium: The atrium was markedly dilated. 7.  Tricuspid valve: There was moderate-severe regurgitation. 8.  Pulmonary arteries: Systolic pressure could not be accurately estimated      and underestimated due to severity of tricuspid regurgitation.

## 2018-07-20 ENCOUNTER — APPOINTMENT (OUTPATIENT)
Dept: GENERAL RADIOLOGY | Facility: HOSPITAL | Age: 78
DRG: 291 | End: 2018-07-20
Attending: HOSPITALIST
Payer: MEDICARE

## 2018-07-20 LAB
ANION GAP SERPL CALC-SCNC: 8 MMOL/L (ref 0–18)
BASOPHILS # BLD AUTO: 0.02 X10(3) UL (ref 0–0.1)
BASOPHILS NFR BLD AUTO: 0.3 %
BUN BLD-MCNC: 17 MG/DL (ref 8–20)
BUN/CREAT SERPL: 14.5 (ref 10–20)
CALCIUM BLD-MCNC: 8.7 MG/DL (ref 8.3–10.3)
CHLORIDE SERPL-SCNC: 94 MMOL/L (ref 101–111)
CO2 SERPL-SCNC: 35 MMOL/L (ref 22–32)
CREAT BLD-MCNC: 1.17 MG/DL (ref 0.7–1.3)
EOSINOPHIL # BLD AUTO: 0.13 X10(3) UL (ref 0–0.3)
EOSINOPHIL NFR BLD AUTO: 1.8 %
ERYTHROCYTE [DISTWIDTH] IN BLOOD BY AUTOMATED COUNT: 15.9 % (ref 11.5–16)
GLUCOSE BLD-MCNC: 102 MG/DL (ref 70–99)
HAV IGM SER QL: 2 MG/DL (ref 1.8–2.5)
HCT VFR BLD AUTO: 41.7 % (ref 37–53)
HGB BLD-MCNC: 14.1 G/DL (ref 13–17)
IMMATURE GRANULOCYTE COUNT: 0.03 X10(3) UL (ref 0–1)
IMMATURE GRANULOCYTE RATIO %: 0.4 %
INR BLD: 3.26 (ref 0.9–1.1)
LYMPHOCYTES # BLD AUTO: 0.38 X10(3) UL (ref 0.9–4)
LYMPHOCYTES NFR BLD AUTO: 5.2 %
MCH RBC QN AUTO: 32.1 PG (ref 27–33.2)
MCHC RBC AUTO-ENTMCNC: 33.8 G/DL (ref 31–37)
MCV RBC AUTO: 95 FL (ref 80–99)
MONOCYTES # BLD AUTO: 0.35 X10(3) UL (ref 0.1–1)
MONOCYTES NFR BLD AUTO: 4.8 %
NEUTROPHIL ABS PRELIM: 6.33 X10 (3) UL (ref 1.3–6.7)
NEUTROPHILS # BLD AUTO: 6.33 X10(3) UL (ref 1.3–6.7)
NEUTROPHILS NFR BLD AUTO: 87.5 %
OSMOLALITY SERPL CALC.SUM OF ELEC: 286 MOSM/KG (ref 275–295)
PLATELET # BLD AUTO: 196 10(3)UL (ref 150–450)
POTASSIUM SERPL-SCNC: 3.7 MMOL/L (ref 3.6–5.1)
PSA SERPL DL<=0.01 NG/ML-MCNC: 34.2 SECONDS (ref 12.4–14.7)
RBC # BLD AUTO: 4.39 X10(6)UL (ref 3.8–5.8)
RED CELL DISTRIBUTION WIDTH-SD: 55.6 FL (ref 35.1–46.3)
SODIUM SERPL-SCNC: 137 MMOL/L (ref 136–144)
WBC # BLD AUTO: 7.2 X10(3) UL (ref 4–13)

## 2018-07-20 PROCEDURE — 97535 SELF CARE MNGMENT TRAINING: CPT

## 2018-07-20 PROCEDURE — 83735 ASSAY OF MAGNESIUM: CPT | Performed by: HOSPITALIST

## 2018-07-20 PROCEDURE — 74019 RADEX ABDOMEN 2 VIEWS: CPT | Performed by: HOSPITALIST

## 2018-07-20 PROCEDURE — 85610 PROTHROMBIN TIME: CPT | Performed by: INTERNAL MEDICINE

## 2018-07-20 PROCEDURE — 80048 BASIC METABOLIC PNL TOTAL CA: CPT | Performed by: NURSE PRACTITIONER

## 2018-07-20 PROCEDURE — 97166 OT EVAL MOD COMPLEX 45 MIN: CPT

## 2018-07-20 PROCEDURE — 97530 THERAPEUTIC ACTIVITIES: CPT

## 2018-07-20 PROCEDURE — 97110 THERAPEUTIC EXERCISES: CPT

## 2018-07-20 PROCEDURE — 85025 COMPLETE CBC W/AUTO DIFF WBC: CPT | Performed by: HOSPITALIST

## 2018-07-20 PROCEDURE — 97116 GAIT TRAINING THERAPY: CPT

## 2018-07-20 RX ORDER — POTASSIUM CHLORIDE 20 MEQ/1
40 TABLET, EXTENDED RELEASE ORAL ONCE
Status: COMPLETED | OUTPATIENT
Start: 2018-07-20 | End: 2018-07-20

## 2018-07-20 NOTE — PROGRESS NOTES
[facility] hospitalist Daily note  Patient was seen/examined 7/19/18     S; no chest pain, no abd pain, no nausea/ urinating     Medications in EPIC     PE vitals in EPIC  Gen: awake, alert, no respiratory distress  HEENT; anicteric  CV: nl S1/S2  Pulm: no wheezi

## 2018-07-20 NOTE — OCCUPATIONAL THERAPY NOTE
OCCUPATIONAL THERAPY EVALUATION - INPATIENT     Room Number: 7282/1344-R  Evaluation Date: 7/20/2018  Type of Evaluation: Initial  Presenting Problem: CHF    Physician Order: IP Consult to Occupational Therapy  Reason for Therapy: ADL/IADL Dysfunction and AHI 6 SaO2 gary 92 % CPAP 8    • Unspecified sleep apnea    • Visual impairment        Past Surgical History  Past Surgical History:  No date: ADENOIDECTOMY      Comment: palate surgery,deviated septum  No date: ANGIOPLASTY (CORONARY)      Comment: one Procedure: LUMBAR FACET INJECTION OR MEDIAL                BRANCH NERVE BLOCK;  Surgeon: Ernesto Ricci MD;  Location: [address] [place] [address] MANAGEMENT  1/14/2016: INJ PARAVERT F JNT L/S 2 LEV Right      Comment: Procedure: LUMBAR [facility]  8/20/2015: INJECTION, ANESTHETIC/STEROID, TRANSFORAMINAL * N/A      Comment: Procedure: TRANSFORAMINAL EPIDURAL - LUMBAR;                 Surgeon: Jonna Grewal MD;  Location: [address] [place] [address] surgery for bowel obstruction  1/8/2014: PATIENT DOCUMENTED NOT TO HAVE EXPERIENCED ANY*      Comment: Procedure: LUMBAR FACET INJECTION OR MEDIAL                BRANCH NERVE BLOCK;  Surgeon: Norberto Trimble MD;  Location: [address] [place] INJECTION OR MEDIAL                BRANCH NERVE BLOCK;  Surgeon: Chana Ely MD;  Location: [address] [place] [address] MANAGEMENT  3/2/2016: PATIENT DOCUMENTED NOT TO HAVE EXPERIENCED ANY* Right      Comment: Procedure: TRANSFORAMINAL EPIDURAL [facility]  9/18/2015: PATIENT [place] PREOPERATIVE ORDER FOR IV ANT* N/A      Comment: Procedure: LUMBAR EPIDURAL;  Surgeon:                Rylie Mustafa MD;  Location: [place] [address] MANAGEMENT  10/2/2015: PATIENT VU and Equipment: Walk-in shower     Occupation/Status: retired  Hand Dominance: Left  Drives: No  Patient Regularly Uses: Glasses    Prior Level of Function: Patient's family contacted for patient's history.  Patient lives in one level home in [place] [address] Reports \"very content with his life\" and \"enjoying his intermediate\"    RANGE OF MOTION AND STRENGTH ASSESSMENT  Upper extremity ROM is within functional limits    Upper extremity strength is within functional limits    COORDINATION  Gross Motor    [facility] RW, max amount of verbal cues provided for sequencing. Patient perseverating over the sequence to perform stand <> sit to toilet seat with use of R side grab bar.  Patient performed hand hygiene at sink level w/ supervision and max verbal cuing/ tactile cu deficits, maximizing patient’s ability to return safely to his prior level of function.     Patient Complexity  Occupational Profile/Medical History MODERATE - Expanded review of history including review of medical or therapy record   Specific performance d

## 2018-07-20 NOTE — PHYSICAL THERAPY NOTE
PHYSICAL THERAPY TREATMENT NOTE - INPATIENT    Room Number: 6201/6130-O     Session: 1   Number of Visits to Meet Established Goals: 5    Presenting Problem: Acute on chronic congestive heart failure    Problem List  Principal Problem:    Acute on chronic GUIDANCE NEEDLE PLACEMENT      Comment: Procedure: LUMBAR FACET INJECTION OR MEDIAL                BRANCH NERVE BLOCK;  Surgeon: Ramonita Gallegos MD;  Location: [address] [place] [address]  No date: HERNIA SURGERY  4/13/2015: INGUINAL HER Location: [address] [place] [address] MANAGEMENT  8/20/2015: INJECTION, ANESTHETIC/STEROID, TRANSFORAMINAL * N/A      Comment: Procedure: TRANSFORAMINAL EPIDURAL - LUMBAR;                 Surgeon: Dora Ramon MD;  Location: [address] [place] [address] TRANSFORAMINAL EPIDURAL - LUMBAR;                 Surgeon: Dequan Leos MD;  Location: [address] MANAGEMENT  3/29/2016: INJECTION, ANESTHETIC/STEROID, TRANSFORAMINAL * Right      Comment: Procedure: TRANSFORAMINAL EPIDURAL - LUM Procedure: TRANSFORAMINAL EPIDURAL - LUMBAR;                 Surgeon: Aldo Wolfe MD;  Location: [address] [place] [address] MANAGEMENT  9/4/2015: PATIENT DOCUMENTED NOT TO HAVE EXPERIENCED ANY* Right      Comment: Procedure: TRANSFORAMINAL EP PAIN MANAGEMENT  1/8/2014: PATIENT [place] PREOPERATIVE ORDER FOR IV ANT*      Comment: Procedure: LUMBAR FACET INJECTION OR MEDIAL                BRANCH NERVE BLOCK;  Surgeon: Gordon Machado MD;  Location: [address] [place] [address] BRANCH NERVE BLOCK;  Surgeon: Abebe Crespo MD;  Location: [address] [place] [address] MANAGEMENT  3/2/2016: PATIENT [place] PREOPERATIVE ORDER FOR IV ANT* Right      Comment: Procedure: TRANSFORAMINAL EPIDURAL - LUMBAR; bed?: A Little   How much help from another person does the patient currently need. ..   -   Moving to and from a bed to a chair (including a wheelchair)?: A Little   -   Need to walk in hospital room?: A Little   -   Climbing 3-5 steps with a railing?: A L DISCHARGE RECOMMENDATIONS  PT Discharge Recommendations: Sub-acute rehabilitation (ELOS 11-14 days)     PLAN  PT Treatment Plan: Bed mobility; Endurance; Patient education; Family education;Gait training;Neuromuscular re-educate;Strengthening;Transfer tra

## 2018-07-20 NOTE — CONSULTS
Pharmacy Dosing Service: Warfarin (Coumadin)  Sanam Barbosa is a 77year old male for whom pharmacy has been dosing warfarin (Coumadin). Goal INR is 2-3    Recent Labs   Lab  07/17/18   0224  07/18/18   0551  07/19/18   0555  07/20/18   0557   INR  4.

## 2018-07-20 NOTE — CM/SW NOTE
Discussed in rounds. PT/OT recommended JOY. He has Alzheimers, SW left a message for his daughter, reportedly, she is working on getting patient into an assisted living facility. DON screen requested, will follow up.

## 2018-07-20 NOTE — PROGRESS NOTES
[facility] Cardiology Progress Note    Subjective:  Came to hospital as was overall very weak. Denies any abdominal pain, but had large emesis last evening, and watery diarrhea this am.  He is constantly urinating.      Objective:  /67 (BP Location: Left

## 2018-07-20 NOTE — SIGNIFICANT EVENT
Patient had 13 beats of vtach at 0836. Notified cardiology. Patient was asymptomatic with vitals WDL during event. Will continue to monitor closely.

## 2018-07-21 LAB
ANION GAP SERPL CALC-SCNC: 6 MMOL/L (ref 0–18)
BUN BLD-MCNC: 18 MG/DL (ref 8–20)
BUN/CREAT SERPL: 15.3 (ref 10–20)
CALCIUM BLD-MCNC: 8.3 MG/DL (ref 8.3–10.3)
CHLORIDE SERPL-SCNC: 95 MMOL/L (ref 101–111)
CO2 SERPL-SCNC: 35 MMOL/L (ref 22–32)
CREAT BLD-MCNC: 1.18 MG/DL (ref 0.7–1.3)
GLUCOSE BLD-MCNC: 85 MG/DL (ref 70–99)
INR BLD: 3.28 (ref 0.9–1.1)
OSMOLALITY SERPL CALC.SUM OF ELEC: 283 MOSM/KG (ref 275–295)
POTASSIUM SERPL-SCNC: 3.4 MMOL/L (ref 3.6–5.1)
POTASSIUM SERPL-SCNC: 4.1 MMOL/L (ref 3.6–5.1)
PSA SERPL DL<=0.01 NG/ML-MCNC: 34.4 SECONDS (ref 12.4–14.7)
SODIUM SERPL-SCNC: 136 MMOL/L (ref 136–144)

## 2018-07-21 PROCEDURE — 87493 C DIFF AMPLIFIED PROBE: CPT | Performed by: HOSPITALIST

## 2018-07-21 PROCEDURE — 84132 ASSAY OF SERUM POTASSIUM: CPT | Performed by: INTERNAL MEDICINE

## 2018-07-21 PROCEDURE — 85610 PROTHROMBIN TIME: CPT | Performed by: INTERNAL MEDICINE

## 2018-07-21 PROCEDURE — 80048 BASIC METABOLIC PNL TOTAL CA: CPT | Performed by: CLINICAL NURSE SPECIALIST

## 2018-07-21 RX ORDER — POTASSIUM CHLORIDE 20 MEQ/1
40 TABLET, EXTENDED RELEASE ORAL EVERY 4 HOURS
Status: COMPLETED | OUTPATIENT
Start: 2018-07-21 | End: 2018-07-21

## 2018-07-21 NOTE — PROGRESS NOTES
[facility] hospitalist Daily note  Patient was seen/examined 7/20/18     S; no chest pain, no abd pain  He had episode of emesis (denies abdominal pain)  And loose stools  Patient is on lasix drip       Medications in EPIC     PE vitals in EPIC    Intake/Output S ordered       Nausea/emesis resolved.  No free air on abd X-ray    Loose stools; check stool for c.diff    Preventative INR >3    Not ready for discharge    Another hospitalist will see this patient on 7/21/18        Gia Johnson MD  [facility] hospitalist  [phone]

## 2018-07-21 NOTE — PROGRESS NOTES
[facility] Hospitalist Progress Note                                                                   Rapamela Jr [age]  3/4/1940    SUBJECTIVE:    No cp/sob        OBJECTIVE:  Temp:  [98.4 °F (3 (07/21/18 0740)     PRN: HYDROcodone-acetaminophen    Assessment/Plan:  Principal Problem:    Acute on chronic congestive heart failure, unspecified heart failure type ([facility]  Active Problems:    [facility]    Fall, init

## 2018-07-21 NOTE — PROGRESS NOTES
[facility] Cardiology Progress Note    Subjective:  Had a good night. He tells me he walked in the liz yesterday with good tolerance. No further emesis or diarrhea.     Objective:  /63 (BP Location: Left arm)   Pulse 55   Temp 98.7 °F (37.1 °C) (Oral)

## 2018-07-21 NOTE — PLAN OF CARE
Problem: Patient/Family Goals  Goal: Patient/Family Long Term Goal  Patient's Long Term Goal: To go home. Interventions:  - Follow current plan of care.   - Remain free from falls  -  Holding Coumadin  -  IV lasix  - See additional Care Plan goals for sp Outcome: Progressing  Denies shortness or breath   in progress , oxygen saturation 95%  Resting in bed    Problem: Impaired Functional Mobility  Goal: Achieve highest/safest level of mobility/gait  Interventions:  - Assess patient's functional ability

## 2018-07-21 NOTE — PROGRESS NOTES
Neurology follow up NOTE    SUBJECTIVE:  She stated she is nor confused at all.      OBJECTIVE:   /56 (BP Location: Left arm)   Pulse 56   Temp 98.4 °F (36.9 °C) (Oral)   Resp 18   Wt 180 lb 5.4 oz (81.8 kg)   SpO2 100%   BMI 24.46 kg/m²   Review syst [facility])  Per cardiology       Cerebral microvascular disease  Chronic, primary risk reduction for stroke is recommended to continue    PLAN:     No further neurological work up as needed, Neuro F/U prn

## 2018-07-21 NOTE — PLAN OF CARE
CARDIOVASCULAR - ADULT    • Absence of cardiac arrhythmias or at baseline Progressing        Impaired Activities of Daily Living    • Achieve highest/safest level of independence in self care Progressing        Impaired Functional Mobility    • Achieve hig

## 2018-07-21 NOTE — PROGRESS NOTES
[facility] Dosing Service  Warfarin (Coumadin) Subsequent Dosing    Yaneth Gaytan is a 77year old male for whom pharmacy has been dosing warfarin (Coumadin).  Goal INR is 2-3    Recent Labs   Lab  07/17/18   0224  07/18/18   0551  07/19/18   0555  0

## 2018-07-22 LAB
ANION GAP SERPL CALC-SCNC: 2 MMOL/L (ref 0–18)
BUN BLD-MCNC: 21 MG/DL (ref 8–20)
BUN/CREAT SERPL: 16.7 (ref 10–20)
CALCIUM BLD-MCNC: 8.7 MG/DL (ref 8.3–10.3)
CHLORIDE SERPL-SCNC: 95 MMOL/L (ref 101–111)
CO2 SERPL-SCNC: 39 MMOL/L (ref 22–32)
CREAT BLD-MCNC: 1.26 MG/DL (ref 0.7–1.3)
GLUCOSE BLD-MCNC: 83 MG/DL (ref 70–99)
HAV IGM SER QL: 1.8 MG/DL (ref 1.8–2.5)
INR BLD: 3.07 (ref 0.9–1.1)
OSMOLALITY SERPL CALC.SUM OF ELEC: 284 MOSM/KG (ref 275–295)
POTASSIUM SERPL-SCNC: 4.2 MMOL/L (ref 3.6–5.1)
PSA SERPL DL<=0.01 NG/ML-MCNC: 31.2 SECONDS (ref 12–14.1)
SODIUM SERPL-SCNC: 136 MMOL/L (ref 136–144)

## 2018-07-22 PROCEDURE — 83735 ASSAY OF MAGNESIUM: CPT | Performed by: INTERNAL MEDICINE

## 2018-07-22 PROCEDURE — 85610 PROTHROMBIN TIME: CPT

## 2018-07-22 PROCEDURE — 80048 BASIC METABOLIC PNL TOTAL CA: CPT | Performed by: CLINICAL NURSE SPECIALIST

## 2018-07-22 RX ORDER — MAGNESIUM OXIDE 400 MG (241.3 MG MAGNESIUM) TABLET
400 TABLET ONCE
Status: COMPLETED | OUTPATIENT
Start: 2018-07-22 | End: 2018-07-22

## 2018-07-22 NOTE — PROGRESS NOTES
[facility] Dosing Service  Warfarin (Coumadin) Subsequent Dosing    Yasmani Parham is a 77year old male for whom pharmacy has been dosing warfarin (Coumadin).  Goal INR is 2-3    Recent Labs   Lab  07/18/18   0551  07/19/18   0555  07/20/18   0557  0

## 2018-07-22 NOTE — PROGRESS NOTES
[facility] Cardiology Progress Note    Subjective:  Feeling so much better. Wanting to know all about what is going on with his health.      Objective:  /64 (BP Location: Left arm)   Pulse 54   Temp 98.2 °F (36.8 °C) (Oral)   Resp 20   Wt 164 lb 3.2 oz (

## 2018-07-22 NOTE — PLAN OF CARE
Problem: Patient/Family Goals  Goal: Patient/Family Long Term Goal  Patient's Long Term Goal: To go home. Interventions:  - Follow current plan of care.   - Remain free from falls  -  Holding Coumadin  -  IV lasix  - See additional Care Plan goals for sp air  Lung sounds diminished , no cough    Problem: Impaired Functional Mobility  Goal: Achieve highest/safest level of mobility/gait  Interventions:  - Assess patient's functional ability and stability  - Promote increasing activity/tolerance for mobility

## 2018-07-22 NOTE — PROGRESS NOTES
[facility] Hospitalist Progress Note                                                                   Marcella Norris [age]  3/4/1940    SUBJECTIVE:    Denies CP/SOB        OBJECTIVE:  Temp:  [98 °F escitalopram  10 mg Oral Daily   • Memantine HCl  10 mg Oral BID   • Metoprolol Succinate ER  25 mg Oral Daily Beta Blocker   • Pantoprazole Sodium  40 mg Oral QAM AC     Continuous Infusions:   • furosemide (LASIX) infusion 5 mg/hr (07/21/18 0740)     PRN

## 2018-07-23 LAB
ANION GAP SERPL CALC-SCNC: 5 MMOL/L (ref 0–18)
BUN BLD-MCNC: 21 MG/DL (ref 8–20)
BUN/CREAT SERPL: 17.4 (ref 10–20)
CALCIUM BLD-MCNC: 8.7 MG/DL (ref 8.3–10.3)
CHLORIDE SERPL-SCNC: 92 MMOL/L (ref 101–111)
CO2 SERPL-SCNC: 38 MMOL/L (ref 22–32)
CREAT BLD-MCNC: 1.21 MG/DL (ref 0.7–1.3)
GLUCOSE BLD-MCNC: 98 MG/DL (ref 70–99)
HAV IGM SER QL: 1.7 MG/DL (ref 1.8–2.5)
INR BLD: 2.37 (ref 0.9–1.1)
OSMOLALITY SERPL CALC.SUM OF ELEC: 283 MOSM/KG (ref 275–295)
POTASSIUM SERPL-SCNC: 3.7 MMOL/L (ref 3.6–5.1)
PRO-BETA NATRIURETIC PEPTIDE: 3249 PG/ML (ref ?–450)
PSA SERPL DL<=0.01 NG/ML-MCNC: 26.7 SECONDS (ref 12.4–14.7)
SODIUM SERPL-SCNC: 135 MMOL/L (ref 136–144)

## 2018-07-23 PROCEDURE — 83735 ASSAY OF MAGNESIUM: CPT | Performed by: HOSPITALIST

## 2018-07-23 PROCEDURE — 80048 BASIC METABOLIC PNL TOTAL CA: CPT | Performed by: CLINICAL NURSE SPECIALIST

## 2018-07-23 PROCEDURE — 83880 ASSAY OF NATRIURETIC PEPTIDE: CPT | Performed by: NURSE PRACTITIONER

## 2018-07-23 PROCEDURE — 85610 PROTHROMBIN TIME: CPT

## 2018-07-23 RX ORDER — WARFARIN SODIUM 5 MG/1
5 TABLET ORAL
Status: COMPLETED | OUTPATIENT
Start: 2018-07-23 | End: 2018-07-23

## 2018-07-23 RX ORDER — POTASSIUM CHLORIDE 20 MEQ/1
40 TABLET, EXTENDED RELEASE ORAL ONCE
Status: COMPLETED | OUTPATIENT
Start: 2018-07-23 | End: 2018-07-23

## 2018-07-23 RX ORDER — MAGNESIUM OXIDE 400 MG (241.3 MG MAGNESIUM) TABLET
400 TABLET ONCE
Status: COMPLETED | OUTPATIENT
Start: 2018-07-23 | End: 2018-07-23

## 2018-07-23 NOTE — CM/SW NOTE
Received call from pt's wife. Family would like referrals to [facility]. [facility], [address], [place] referrals sent to these facilities. Family to tour and let SW know their choice.     Mely Betancourt, 07/23/18, 2:19 PM

## 2018-07-23 NOTE — PROGRESS NOTES
Heart Failure Coordinator Progress Note    Patient was evaluated by the Heart Failure Coordinator for understanding, verbalization, demonstration, and recall of education related to heart failure, overall adherence to the behaviors Adams Devine SILVANA Crabtree,  [facility]  Heart Failure Coordinator  [phone]

## 2018-07-23 NOTE — PROGRESS NOTES
[facility] Hospitalist Progress Note                                                                   Marcella Norris 1284  3/4/1940    SUBJECTIVE:    States he feels ok, no CP/SOB.          OBJECTIV Scheduled:   • Warfarin Sodium  5 mg Oral Once at night   • aspirin  81 mg Oral Daily   • Donepezil HCl  10 mg Oral Nightly   • escitalopram  10 mg Oral Daily   • Memantine HCl  10 mg Oral BID   • Metoprolol Succinate ER  25 mg Oral Daily Beta Blocker

## 2018-07-23 NOTE — CM/SW NOTE
RN informed sw that pt wanted to talk about rehab plans. SW met with pt. Provided him JOY lists. Explained his daughter was researching rehab options for him and he is in agreement with this.     Mely Betancourt, 07/23/18, 1:48 PM

## 2018-07-23 NOTE — CONSULTS
[facility] Dosing Service  Warfarin (Coumadin) Subsequent Dosing    Yasmani Parham is a 77year old male for whom pharmacy has been dosing warfarin (Coumadin).  Goal INR is 2-3    Recent Labs   Lab  07/19/18   0555  07/20/18   0557  07/21/18   0543  0

## 2018-07-23 NOTE — CM/SW NOTE
ALEX spoke to pt's daughter Isaac ( wk# [phone]). She had expressed interest in [facility] for rehab. Explained that PT was advising JOY. She asked that JOY list be emailed to her at [id number] @ [employer].   AELX sent her JOY list.  She will review a

## 2018-07-23 NOTE — PROGRESS NOTES
[facility]  Cardiology Progress Note    Billie Ballard Patient Status:  Inpatient    3/4/1940 MRN [id number]   [facility] 2NE-A Attending Kailash Coyle MD   [address] Day # 6 PCP Radha Escoto MD     Subjective:  Tells me his mind 07/22/18   0652  07/23/18   0611   INR  3.28*  3.07*  2.37*       Medications:  • Potassium Chloride ER  40 mEq Oral Once   • magnesium oxide  400 mg Oral Once   • aspirin  81 mg Oral Daily   • Donepezil HCl  10 mg Oral Nightly   • escitalopram  10 mg Oral

## 2018-07-24 LAB
ANION GAP SERPL CALC-SCNC: 6 MMOL/L (ref 0–18)
BUN BLD-MCNC: 28 MG/DL (ref 8–20)
BUN/CREAT SERPL: 22.6 (ref 10–20)
CALCIUM BLD-MCNC: 8.4 MG/DL (ref 8.3–10.3)
CHLORIDE SERPL-SCNC: 91 MMOL/L (ref 101–111)
CO2 SERPL-SCNC: 37 MMOL/L (ref 22–32)
CREAT BLD-MCNC: 1.24 MG/DL (ref 0.7–1.3)
GLUCOSE BLD-MCNC: 83 MG/DL (ref 70–99)
HAV IGM SER QL: 1.7 MG/DL (ref 1.8–2.5)
INR BLD: 1.84 (ref 0.9–1.1)
OSMOLALITY SERPL CALC.SUM OF ELEC: 283 MOSM/KG (ref 275–295)
POTASSIUM SERPL-SCNC: 3.7 MMOL/L (ref 3.6–5.1)
PSA SERPL DL<=0.01 NG/ML-MCNC: 21.9 SECONDS (ref 12.4–14.7)
SODIUM SERPL-SCNC: 134 MMOL/L (ref 136–144)

## 2018-07-24 PROCEDURE — 97535 SELF CARE MNGMENT TRAINING: CPT

## 2018-07-24 PROCEDURE — 97530 THERAPEUTIC ACTIVITIES: CPT

## 2018-07-24 PROCEDURE — 85610 PROTHROMBIN TIME: CPT | Performed by: HOSPITALIST

## 2018-07-24 PROCEDURE — 83735 ASSAY OF MAGNESIUM: CPT | Performed by: INTERNAL MEDICINE

## 2018-07-24 PROCEDURE — 80048 BASIC METABOLIC PNL TOTAL CA: CPT | Performed by: NURSE PRACTITIONER

## 2018-07-24 RX ORDER — POTASSIUM CHLORIDE 20 MEQ/1
40 TABLET, EXTENDED RELEASE ORAL ONCE
Status: COMPLETED | OUTPATIENT
Start: 2018-07-24 | End: 2018-07-24

## 2018-07-24 RX ORDER — MAGNESIUM OXIDE 400 MG (241.3 MG MAGNESIUM) TABLET
400 TABLET ONCE
Status: COMPLETED | OUTPATIENT
Start: 2018-07-24 | End: 2018-07-24

## 2018-07-24 RX ORDER — SPIRONOLACTONE 25 MG/1
12.5 TABLET ORAL DAILY
Status: DISCONTINUED | OUTPATIENT
Start: 2018-07-24 | End: 2018-07-25

## 2018-07-24 RX ORDER — WARFARIN SODIUM 5 MG/1
5 TABLET ORAL
Status: COMPLETED | OUTPATIENT
Start: 2018-07-24 | End: 2018-07-24

## 2018-07-24 NOTE — PROGRESS NOTES
Assumed care of pt at [address]. Pt alert and oriented but forgetful. VSS, Afib on monitor rates controlled in 50s. Spo2 >93% on room air. Pt denies any cp, nausea, dizziness or sob. Trace edema on lower extremities. Lung sounds clear.  Lasix gtt infusing at 5mg

## 2018-07-24 NOTE — OCCUPATIONAL THERAPY NOTE
OCCUPATIONAL THERAPY TREATMENT NOTE - INPATIENT     Room Number: 1693/4579-T  Session: 1  Number of Visits to Meet Established Goals: 5    Presenting Problem: CHF    History related to current admission: Pt is 77year old male admitted on 7/17/2018 from [facility] Comment: palate surgery,deviated septum  No date: ANGIOPLASTY (CORONARY)      Comment: one vessel  No date: APPENDECTOMY  6/2014: CABG      Comment: 4 bypass  No date: CATH PERCUTANEOUS  TRANSLUMINAL CORONARY ANGIO*  12/2014: COLECTOMY      Comment: for ashli MANAGEMENT  1/14/2016: INJ PARAVERT F JNT L/S 2 LEV Right      Comment: Procedure: LUMBAR FACET INJECTION OR MEDIAL                BRANCH NERVE BLOCK;  Surgeon: Toy Carter MD;  Location: [address] [place] [address] MANAGEMENT  2/1/2016: INJ PAR Surgeon: Silviano Arvizu MD;  Location: [address] [place] [address] MANAGEMENT  9/4/2015: INJECTION, ANESTHETIC/STEROID, TRANSFORAMINAL * Right      Comment: Procedure: TRANSFORAMINAL EPIDURAL - LUMBAR;                 Surgeon: Amando Santana BRANCH NERVE BLOCK;  Surgeon: Shelbie Estrada MD;  Location: [address] [place] [address] MANAGEMENT  6/17/2015: PATIENT DOCUMENTED NOT TO HAVE EXPERIENCED ANY* N/A      Comment: Procedure: LUMBAR EPIDURAL;  Surgeon:                Malik Wright, DOCUMENTED NOT TO HAVE EXPERIENCED ANY* Right      Comment: Procedure: TRANSFORAMINAL EPIDURAL - LUMBAR;                 Surgeon: Jose E Chapman MD;  Location: [address] [place] [address] MANAGEMENT  3/10/2016: PATIENT DOCUMENTED NOT TO HAVE EXPERI Yfn Briscoe MD;  Location:  [address] MANAGEMENT  10/2/2015: PATIENT [place] PREOPERATIVE ORDER FOR IV ANT* N/A      Comment: Procedure: LUMBAR EPIDURAL;  Surgeon:                Yfn Briscoe MD;  Location: [address] [place] [address]  breath    ACTIVITIES OF DAILY LIVING ASSESSMENT  AM-PAC ‘6-Clicks’ Inpatient Daily Activity Short Form  How much help from another person does the patient currently need…  -   Putting on and taking off regular lower body clothing?: A Lot  -   Bathing (incl has physical limitations, unable to ambulate for short distances and requiring min A for transfers to surfaces. Patient requiring max physical and verbal cuing for ADLs as well as assistance for functional mobility this OT evaluation.  Patient would benefit

## 2018-07-24 NOTE — PROGRESS NOTES
[facility] Hospitalist Progress Note                                                                   Marcella Norris 1284  3/4/1940    SUBJECTIVE:    Denies CP/SOB.          OBJECTIVE:  Temp:  [97.7 Donepezil HCl  10 mg Oral Nightly   • escitalopram  10 mg Oral Daily   • Memantine HCl  10 mg Oral BID   • Metoprolol Succinate ER  25 mg Oral Daily Beta Blocker   • Pantoprazole Sodium  40 mg Oral QAM AC     Continuous Infusions:     PRN: HYDROcodone-acet

## 2018-07-24 NOTE — PROGRESS NOTES
[facility]  [facility] Cardiology Progress Note    Nellie Masters Patient Status:  Inpatient    3/4/1940 MRN [id number]   [facility] 2NE-A Attending Jony Gray MD   [address] Day # 7 PCP Megan Bahena MD     Subjective:  He i Alert, in no apparent distress  Neck:  no JVD, noted TR on exam  Cardiac:  Irregular with 3/6 CATALINA  Lungs:  Slightly diminished at the bases  Abdomen:  Mildly distended, soft, non-tender, BS+. Extremities:   No LE edema. 2+ peripheral pulses. Neuro:    Al

## 2018-07-24 NOTE — PROGRESS NOTES
[facility] Dosing Service  Warfarin (Coumadin) Subsequent Dosing    Haritha Mancilla is a 77year old male for whom pharmacy has been dosing warfarin (Coumadin).  Goal INR is 2-3    Recent Labs   Lab  07/20/18   0557  07/21/18   0543  07/22/18   0652  0

## 2018-07-24 NOTE — PROGRESS NOTES
07/24/18 1836   Clinical Encounter Type   Visited With Patient   Routine Visit Introduction   Continue Visiting No   Patient's Supportive Strategies/Resources  provided emotional support, including active listening and ackowledgement of concerns

## 2018-07-24 NOTE — CM/SW NOTE
Left voice mail message with dtr [phone] inquiring about christie choice. 1130 received call back from Isaac, #1 choice [facility], #2  hospitals, #3 Richard Edmond dtr requesting [facility] transport at d/c, aware of costs.      Keyshawn Lee RN, [facility] [employer]

## 2018-07-25 ENCOUNTER — PRIOR ORIGINAL RECORDS (OUTPATIENT)
Dept: OTHER | Age: 78
End: 2018-07-25

## 2018-07-25 VITALS
TEMPERATURE: 98 F | OXYGEN SATURATION: 97 % | WEIGHT: 153 LBS | SYSTOLIC BLOOD PRESSURE: 120 MMHG | HEART RATE: 55 BPM | BODY MASS INDEX: 21 KG/M2 | DIASTOLIC BLOOD PRESSURE: 68 MMHG | RESPIRATION RATE: 18 BRPM

## 2018-07-25 LAB
ANION GAP SERPL CALC-SCNC: 4 MMOL/L (ref 0–18)
BUN BLD-MCNC: 29 MG/DL (ref 8–20)
BUN/CREAT SERPL: 23 (ref 10–20)
CALCIUM BLD-MCNC: 8.8 MG/DL (ref 8.3–10.3)
CHLORIDE SERPL-SCNC: 94 MMOL/L (ref 101–111)
CO2 SERPL-SCNC: 39 MMOL/L (ref 22–32)
CREAT BLD-MCNC: 1.26 MG/DL (ref 0.7–1.3)
GLUCOSE BLD-MCNC: 84 MG/DL (ref 70–99)
HAV IGM SER QL: 1.8 MG/DL (ref 1.8–2.5)
INR BLD: 1.83 (ref 0.9–1.1)
OSMOLALITY SERPL CALC.SUM OF ELEC: 289 MOSM/KG (ref 275–295)
POTASSIUM SERPL-SCNC: 4 MMOL/L (ref 3.6–5.1)
PSA SERPL DL<=0.01 NG/ML-MCNC: 21.8 SECONDS (ref 12.4–14.7)
SODIUM SERPL-SCNC: 137 MMOL/L (ref 136–144)

## 2018-07-25 PROCEDURE — 83735 ASSAY OF MAGNESIUM: CPT | Performed by: HOSPITALIST

## 2018-07-25 PROCEDURE — 80048 BASIC METABOLIC PNL TOTAL CA: CPT | Performed by: NURSE PRACTITIONER

## 2018-07-25 PROCEDURE — 85610 PROTHROMBIN TIME: CPT | Performed by: HOSPITALIST

## 2018-07-25 RX ORDER — MAGNESIUM OXIDE 400 MG (241.3 MG MAGNESIUM) TABLET
400 TABLET ONCE
Status: COMPLETED | OUTPATIENT
Start: 2018-07-25 | End: 2018-07-25

## 2018-07-25 RX ORDER — HYDROCODONE BITARTRATE AND ACETAMINOPHEN 5; 325 MG/1; MG/1
1 TABLET ORAL EVERY 8 HOURS PRN
Qty: 60 TABLET | Refills: 0 | Status: SHIPPED | OUTPATIENT
Start: 2018-07-25 | End: 2019-01-10

## 2018-07-25 RX ORDER — WARFARIN SODIUM 5 MG/1
5 TABLET ORAL
Status: DISCONTINUED | OUTPATIENT
Start: 2018-07-25 | End: 2018-07-25

## 2018-07-25 RX ORDER — SPIRONOLACTONE 25 MG/1
12.5 TABLET ORAL DAILY
Qty: 30 TABLET | Refills: 5 | Status: SHIPPED | OUTPATIENT
Start: 2018-07-26

## 2018-07-25 RX ORDER — FUROSEMIDE 20 MG/1
20 TABLET ORAL 2 TIMES DAILY
Qty: 60 TABLET | Refills: 5 | Status: SHIPPED | OUTPATIENT
Start: 2018-07-25

## 2018-07-25 NOTE — CM/SW NOTE
Discharge disposition   Expected discharge disposition Skilled Nurs   Name of Facillity/Home Care/Hospice [facility]   Discharge transportation [facility]   Patient has been accepted to [facility], [employer] setup for 230pm , reviewed with

## 2018-07-25 NOTE — PROGRESS NOTES
[facility]  Cardiology Progress Note    Subjective:  No chest pain or shortness of breath.     Objective:  /73 (BP Location: Left arm)   Pulse 55   Temp 98.2 °F (36.8 °C) (Oral)   Resp 18   Wt 153 lb (69.4 kg)   SpO2 97%   BMI 20.75 kg/m²     Lab

## 2018-07-25 NOTE — DISCHARGE SUMMARY
General Medicine Discharge Summary     Patient ID:  Shalonda Storey  77year old  3/4/1940    Admit date: 7/17/2018    Discharge date and time: 7/25/2018  3:03 PM     Attending Physician: Mamta Solano course:    Fall prior to admission  -no acute intracranial abnormality on CT head  -Patient with interval increase microvacular changes and old lacunar infarct. Patient with dementia.  Neuro saw patient, no further neurology workup indicated and have signed Normal, Disp-60 tablet, R-3ASAP    Donepezil HCl 10 MG Oral Tab  Take 1 tablet (10 mg total) by mouth nightly., Normal, Disp-30 tablet, R-11    escitalopram 10 MG Oral Tab  Take 1 tablet (10 mg total) by mouth daily. , Historical, Disp-30 tablet, R-11Per Dr

## 2018-07-25 NOTE — PROGRESS NOTES
NURSING DISCHARGE NOTE    Received orders to discharge pt. No family present at d/c. Instructions given on prescribed medications and provided educational handouts on new meds. Instructions given on follow-up appointments per MD orders.  RN spoke to pt'

## 2018-07-25 NOTE — PLAN OF CARE
CARDIOVASCULAR - ADULT    • Absence of cardiac arrhythmias or at baseline Progressing          Assumed care of pt at 0700. Feels good. Denies SOB. Verbalized he wants to go home instead of rehab. Aflutter, HR 50s. Weight up 2 lbs today.  Reinforced import

## 2018-07-25 NOTE — PROGRESS NOTES
[facility] Dosing Service  Warfarin (Coumadin) Subsequent Dosing    Felicita Flores is a 77year old male for whom pharmacy has been dosing warfarin (Coumadin).  Goal INR is 2-3    Recent Labs   Lab  07/21/18   0543  07/22/18   0652  07/23/18   0611  0

## 2018-07-26 ENCOUNTER — SNF VISIT (OUTPATIENT)
Dept: INTERNAL MEDICINE CLINIC | Age: 78
End: 2018-07-26

## 2018-07-26 DIAGNOSIS — Z91.81 HISTORY OF FALLING: ICD-10-CM

## 2018-07-26 DIAGNOSIS — R79.1 SUBTHERAPEUTIC INTERNATIONAL NORMALIZED RATIO (INR): ICD-10-CM

## 2018-07-26 DIAGNOSIS — F02.80 ALZHEIMER'S DEMENTIA WITHOUT BEHAVIORAL DISTURBANCE, UNSPECIFIED TIMING OF DEMENTIA ONSET (HCC): ICD-10-CM

## 2018-07-26 DIAGNOSIS — Z79.899 MEDICATION MANAGEMENT: ICD-10-CM

## 2018-07-26 DIAGNOSIS — G30.9 ALZHEIMER'S DEMENTIA WITHOUT BEHAVIORAL DISTURBANCE, UNSPECIFIED TIMING OF DEMENTIA ONSET (HCC): ICD-10-CM

## 2018-07-26 DIAGNOSIS — R53.81 PHYSICAL DECONDITIONING: Primary | ICD-10-CM

## 2018-07-26 PROCEDURE — 99310 SBSQ NF CARE HIGH MDM 45: CPT | Performed by: NURSE PRACTITIONER

## 2018-07-30 ENCOUNTER — DOCUMENTATION ONLY (OUTPATIENT)
Dept: INTERNAL MEDICINE CLINIC | Age: 78
End: 2018-07-30

## 2018-07-30 NOTE — PROGRESS NOTES
Phillip Reynolds  : 3/4/1940  Age 77year old  male patient is admitted to Facility: [facility] for [facility] [facility] date:  18  Discharge date to [facility]:  18  ELOS:  11-14 days  Anticipated discharge date:  18  Luci Urban (CORONARY)      Comment: one vessel  No date: APPENDECTOMY  6/2014: CABG      Comment: 4 bypass  No date: CATH PERCUTANEOUS  TRANSLUMINAL CORONARY ANGIO*  12/2014: COLECTOMY      Comment: for bowel obstruction  No date: COLON SURGERY  5/23/03: COLONOSCOPY, Procedure: LUMBAR FACET INJECTION OR MEDIAL                BRANCH NERVE BLOCK;  Surgeon: Keily Bo MD;  Location: [address] [place] [address] MANAGEMENT  2/1/2016: Bandar Calzada L/S 2 [facility] Right      Comment: Procedure: LUMBAR FACET INJECT CENTER FOR PAIN MANAGEMENT  9/4/2015: INJECTION, ANESTHETIC/STEROID, TRANSFORAMINAL * Right      Comment: Procedure: TRANSFORAMINAL EPIDURAL - LUMBAR;                 Surgeon: Janett Mcmanus MD;  Location: [address] [place] [address] Location: [facility] CENTER FOR PAIN MANAGEMENT  6/17/2015: PATIENT DOCUMENTED NOT TO HAVE EXPERIENCED ANY* N/A      Comment: Procedure: LUMBAR EPIDURAL;  Surgeon:                Sona Davila MD;  Location: [address] [place] [address] Dr               PAIN MANAGEMENT  8/5/2 TRANSFORAMINAL EPIDURAL - LUMBAR;                 Surgeon: Ann Marie Umaña MD;  Location: [address] [place] [address] MANAGEMENT  3/10/2016: PATIENT DOCUMENTED NOT TO HAVE EXPERIENCED ANY* Right      Comment: Procedure: TRANSFORAMINAL EPIDURAL - LUM MANAGEMENT  10/2/2015: PATIENT Sharda Puentes PREOPERATIVE ORDER FOR IV ANT* N/A      Comment: Procedure: LUMBAR EPIDURAL;  Surgeon:                Manisha Acevedo MD;  Location: [place] [address] MANAGEMENT  1/14/2016: PATIENT Bran Cleary None      CURRENT MEDICATIONS     Current Outpatient Prescriptions:  spironolactone 25 MG Oral Tab Take 0.5 tablets (12.5 mg total) by mouth daily. Disp: 30 tablet Rfl: 5   furosemide 20 MG Oral Tab Take 1 tablet (20 mg total) by mouth 2 (two) times daily. unexpected wt gain or wt loss  ALLERGY/IMM.: denies food or seasonal allergies      PHYSICAL EXAM:  GENERAL HEALTH: well developed, thin male, in no apparent distress  LINES, TUBES, DRAINS:  none  RESPIRATORY:clear to auscultation  CARDIOVASCULAR: ---irreg LYMABS 0.38 (L) 07/20/2018   MOABSO 0.35 07/20/2018   EOABSO 0.13 07/20/2018   BAABSO 0.02 07/20/2018       [facility] medical records, notes, lab and imaging results reviewed. Medication reconciliation completed.         Assessment and Plan:  Physical Decon care in [facility] [facility]  07/26/18   11:00 AM

## 2018-07-31 ENCOUNTER — PRIOR ORIGINAL RECORDS (OUTPATIENT)
Dept: OTHER | Age: 78
End: 2018-07-31

## 2018-07-31 ENCOUNTER — SNF VISIT (OUTPATIENT)
Dept: INTERNAL MEDICINE CLINIC | Age: 78
End: 2018-07-31

## 2018-07-31 DIAGNOSIS — Z79.01 CURRENT USE OF LONG TERM ANTICOAGULATION: ICD-10-CM

## 2018-07-31 DIAGNOSIS — R53.81 PHYSICAL DECONDITIONING: Primary | ICD-10-CM

## 2018-07-31 DIAGNOSIS — E83.42 HYPOMAGNESEMIA: ICD-10-CM

## 2018-07-31 DIAGNOSIS — Z86.79 HISTORY OF CHF (CONGESTIVE HEART FAILURE): ICD-10-CM

## 2018-07-31 DIAGNOSIS — Z79.899 MEDICATION MANAGEMENT: ICD-10-CM

## 2018-07-31 PROCEDURE — 99310 SBSQ NF CARE HIGH MDM 45: CPT | Performed by: NURSE PRACTITIONER

## 2018-07-31 RX ORDER — MAGNESIUM OXIDE 400 MG (241.3 MG MAGNESIUM) TABLET
400 TABLET DAILY
COMMUNITY
Start: 2018-07-30 | End: 2018-08-03

## 2018-08-02 ENCOUNTER — SNF VISIT (OUTPATIENT)
Dept: INTERNAL MEDICINE CLINIC | Age: 78
End: 2018-08-02

## 2018-08-02 DIAGNOSIS — F02.80 ALZHEIMER'S DEMENTIA WITHOUT BEHAVIORAL DISTURBANCE, UNSPECIFIED TIMING OF DEMENTIA ONSET (HCC): ICD-10-CM

## 2018-08-02 DIAGNOSIS — G30.9 ALZHEIMER'S DEMENTIA WITHOUT BEHAVIORAL DISTURBANCE, UNSPECIFIED TIMING OF DEMENTIA ONSET (HCC): ICD-10-CM

## 2018-08-02 DIAGNOSIS — Z79.01 CURRENT USE OF LONG TERM ANTICOAGULATION: ICD-10-CM

## 2018-08-02 DIAGNOSIS — Z86.79 HISTORY OF CHF (CONGESTIVE HEART FAILURE): ICD-10-CM

## 2018-08-02 DIAGNOSIS — R53.81 PHYSICAL DECONDITIONING: Primary | ICD-10-CM

## 2018-08-02 PROCEDURE — 99316 NF DSCHRG MGMT 30 MIN+: CPT | Performed by: NURSE PRACTITIONER

## 2018-08-03 ENCOUNTER — PRIOR ORIGINAL RECORDS (OUTPATIENT)
Dept: OTHER | Age: 78
End: 2018-08-03

## 2018-08-03 VITALS
HEART RATE: 54 BPM | TEMPERATURE: 97 F | SYSTOLIC BLOOD PRESSURE: 126 MMHG | RESPIRATION RATE: 18 BRPM | DIASTOLIC BLOOD PRESSURE: 60 MMHG | OXYGEN SATURATION: 97 %

## 2018-08-03 NOTE — PROGRESS NOTES
8/3/18:     Labs reviewed and discussed the following plan of care orders with staff RNStaci:    INR=2.5; continue Coumadin at 5.5mg po q pm. Recommend repeat PT/INR 8/7/18 prior to discharge. -patient may discharge provided the blood draw is completed.

## 2018-08-03 NOTE — PROGRESS NOTES
Yesenia Steve, 14/1940, 77year old, male is being discharged from Facility: [address] [place] [address]    Date of Admission: 7/25/18    Date of Discharge: 8/7/18                                 Admitting Diagnoses: Hypomagnesemia, Fall, Acut 7/30/2018 11:49 154.5 Lbs Standing ggesite (Manual)   7/29/2018 10:38 154.9 Lbs Standing nhacena (Manual)     Discharge Diagnoses w/ current management:  Physical Deconditioning/Weakness  Home therapies to evaluate and treat    DVT Prophylaxis   Coumadin

## 2018-08-06 ENCOUNTER — DOCUMENTATION ONLY (OUTPATIENT)
Dept: INTERNAL MEDICINE CLINIC | Age: 78
End: 2018-08-06

## 2018-08-10 NOTE — PROGRESS NOTES
Niesha Cooper  : 3/4/1940  Age 77year old  male patient is admitted to [facility] for JOY after in-pt. tx for fall with various sequelae as per Hospitlist Summary below    Chief complaint:   No c/o, comfortable    HPI  A and O X 1-2, rare 3.  Forg of unspecified type of vessel, native or graft 1994    S/P MI, angioplasty LAD   • Esophageal reflux    • Essential hypertension    • Hearing impairment    • Heart attack [facility]) 1974   • Hernia     will be having surgery in the future   • High blood pressur BRANCH NERVE BLOCK;  Surgeon: Shereen Malloy MD;  Location: [address] [place] [address] MANAGEMENT  2/1/2016: Radha Baker L/S 1 LEV Right      Comment: Procedure: LUMBAR FACET INJECTION OR MEDIAL                BRANCH NERVE BL INJECTION, ANESTHETIC/STEROID, TRANSFORAMINAL * Right      Comment: Procedure: TRANSFORAMINAL EPIDURAL - LUMBAR;                 Surgeon: Abby Resendiz MD;  Location: [address] [place] [address] MANAGEMENT  3/29/2016: INJECTION, ANESTHETIC/STEROID [address] MANAGEMENT  10/2/2015: INJECTION, W/WO CONTRAST, DX/THERAPEUTIC SUBST* N/A      Comment: Procedure: LUMBAR EPIDURAL;  Surgeon:                Gabby Culver MD;  Location: [address] Location: [address] [place] [address] Drive MANAGEMENT  1/14/2016: PATIENT DOCUMENTED NOT TO HAVE EXPERIENCED ANY* Right      Comment: Procedure: LUMBAR FACET INJECTION OR MEDIAL                BRANCH NERVE BLOCK;  Surgeon: Lorelei Goode, Jimi Holm PREOPERATIVE ORDER FOR IV ANT* N/A      Comment: Procedure: TRANSFORAMINAL EPIDURAL - LUMBAR;                 Surgeon: Mervin Morris MD;  Location: [address] [place] [address] MANAGEMENT  9/4/2015: PATIENT [address] Ann Marie Umaña MD;  Location: [facility] FOR PAIN MANAGEMENT  No date: SINUS SURGERY        Comment: deviated septum  2002: SPECIAL SERVICE OR REPORT      Comment: S/P uvulopalatoplasty, sinus surgery,                septoplasty  Family His plans= [facility] Jorge this wk. D/C orders written. Total face to face time was over thirty minutes, more than 50% of the time was spent in counseling and/or coordination of care related to above issues.     Patient , and daughter on phone, stat

## 2018-08-17 LAB
ALBUMIN: 3.1 G/DL
ALKALINE PHOSPHATATE(ALK PHOS): 263 IU/L
BILIRUBIN TOTAL: 0.8 MG/DL
BUN: 28 MG/DL
BUN: 29 MG/DL
BUN: 34 MG/DL
CALCIUM: 8.4 MG/DL
CALCIUM: 8.4 MG/DL
CALCIUM: 8.8 MG/DL
CHLORIDE: 100 MEQ/L
CHLORIDE: 91 MEQ/L
CHLORIDE: 94 MEQ/L
CREATININE, SERUM: 1.2 MG/DL
CREATININE, SERUM: 1.24 MG/DL
CREATININE, SERUM: 1.26 MG/DL
GLUCOSE: 80 MG/DL
GLUCOSE: 83 MG/DL
GLUCOSE: 84 MG/DL
HEMATOCRIT: 38 %
HEMOGLOBIN: 12 G/DL
MAGNESIUM: 1.6 MG/DL
MAGNESIUM: 1.7 MG/DL
MAGNESIUM: 1.8 MG/DL
PLATELETS: 205 K/UL
POTASSIUM, SERUM: 3.7 MEQ/L
POTASSIUM, SERUM: 4 MEQ/L
POTASSIUM, SERUM: 4.8 MEQ/L
PROTEIN, TOTAL: 6 G/DL
RED BLOOD COUNT: 3.9 X 10-6/U
SGOT (AST): 57 IU/L
SGPT (ALT): 45 IU/L
SODIUM: 134 MEQ/L
SODIUM: 137 MEQ/L
SODIUM: 140 MEQ/L
WHITE BLOOD COUNT: 5.8 X 10-3/U

## 2018-08-30 ENCOUNTER — DOCUMENTATION ONLY (OUTPATIENT)
Dept: INTERNAL MEDICINE CLINIC | Age: 78
End: 2018-08-30

## 2018-09-01 ENCOUNTER — PRIOR ORIGINAL RECORDS (OUTPATIENT)
Dept: OTHER | Age: 78
End: 2018-09-01

## 2018-09-07 ENCOUNTER — PRIOR ORIGINAL RECORDS (OUTPATIENT)
Dept: OTHER | Age: 78
End: 2018-09-07

## 2018-09-07 LAB — INR: 2.8

## 2018-09-13 ENCOUNTER — PRIOR ORIGINAL RECORDS (OUTPATIENT)
Dept: OTHER | Age: 78
End: 2018-09-13

## 2018-09-13 ENCOUNTER — MYAURORA ACCOUNT LINK (OUTPATIENT)
Dept: OTHER | Age: 78
End: 2018-09-13

## 2018-09-14 ENCOUNTER — PRIOR ORIGINAL RECORDS (OUTPATIENT)
Dept: OTHER | Age: 78
End: 2018-09-14

## 2018-09-14 LAB — INR: 3.7

## 2018-09-21 ENCOUNTER — PRIOR ORIGINAL RECORDS (OUTPATIENT)
Dept: OTHER | Age: 78
End: 2018-09-21

## 2018-09-21 LAB — INR: 3.3

## 2018-09-24 NOTE — PROGRESS NOTES
Jeanine Lamb  : 3/4/1940  Age 77year old  male patient is admitted to [facility] for JOY after in-pt. tx for many fall sequelae as per DC Summary below. Chief complaint:   Says knees gave out leading to fall.  States he's primary caregiver for Corn  1994: Coronary atherosclerosis of unspecified type of vessel, native   or graft      Comment:  S/P MI, angioplasty LAD  No date: Esophageal reflux  No date: Essential hypertension  No date: Hearing impairment  [facility]  No date: Herni Mario Martínez MD at [facility]  4/13/2015: [facility]; Left      Comment:  Performed by Trae Carvalho MD at [facility]  No date: HERNIA SURGERY  4/13/2015: [address]; Left      Comment:  Procedure:  HERNIA INGUINAL REPAIR ADULT;  Gordon ANESTHETIC/STEROID, TRANSFORAMINAL EPIDURAL;   LUMBAR/SACRAL, ADD'L LEVEL; N/A      Comment:  Procedure: TRANSFORAMINAL EPIDURAL - LUMBAR;  Surgeon:                Tram Richardson MD;  Location: [address] [place] [address]                MANAGEMENT  9/4/2015: IN Comment:  Procedure: TRANSFORAMINAL EPIDURAL - LUMBAR;  Surgeon:                Ernesto Ricci MD;  Location: [address] [place] [address]                MANAGEMENT  3/10/2016: INJECTION, ANESTHETIC/STEROID, TRANSFORAMINAL EPIDURAL;   LUMBAR/SACRAL, SINGLE LEVEL; MANAGEMENT  1/14/2016: LUMBAR FACET INJECTION OR MEDIAL BRANCH NERVE BLOCK; Right      Comment:  Performed by Bhanu Hatch MD at [address] [place] [address],[address]  8/5/2015: LUMBAR FACET INJECTION OR MEDIAL BRANCH NERVE BLOCK; Right      Co Rylie Mustafa MD;  Location: [address] [place] [address] MANAGEMENT  10/2/2015: PATIENT DOCUMENTED NOT TO HAVE EXPERIENCED ANY OF THE   FOLLOWING EVENTS; N/A      Comment:  Procedure: LUMBAR EPIDURAL;  Surgeon: Rylie Mustafa MD; ANTIBIOTIC   SURGICAL SITE INFECTION PROPHYLAXIS.; N/A      Comment:  Procedure: LUMBAR EPIDURAL;  Surgeon: Jonna Grewal MD;  Location: [address] [place] [address] MANAGEMENT  8/5/2015: [facility]IENT [place] [facility] PREOPERAT[facility] ORDER FOR IV ANTIBIOTIC   SONG INJECTION OR MEDIAL BRANCH NERVE               BLOCK;  Surgeon: Titus Allen MD;  Location: [address] [place] [address] MANAGEMENT  3/2/2016: PATIENT [place] PREOPERATIVE ORDER FOR IV ANTIBIOTIC   SURGICAL SITE INFECTION PROPHYLAXIS.; Right History    Tobacco Use      Smoking status: Never Smoker      Smokeless tobacco: Never Used    Alcohol use:  Yes      Alcohol/week: 0.0 oz      Comment: 0-2 per month    Drug use: No      ALLERGIES:  No Known Allergies    CODE STATUS:  full code    ADVANCED consider DEXA     Diarrhea= resolved. No DHCPOA, only Living Will, on chart; advised execution of this to dtr.        D/C plans= home with wife  but, per 7/26 APN note, both may need FPC. Dtr.  [address] pt. will go to [facility] care.         Total face

## 2018-09-28 ENCOUNTER — PRIOR ORIGINAL RECORDS (OUTPATIENT)
Dept: OTHER | Age: 78
End: 2018-09-28

## 2018-09-28 LAB — INR: 1.5

## 2018-10-05 ENCOUNTER — PRIOR ORIGINAL RECORDS (OUTPATIENT)
Dept: OTHER | Age: 78
End: 2018-10-05

## 2018-10-05 LAB — INR: 1.7

## 2018-10-09 ENCOUNTER — PRIOR ORIGINAL RECORDS (OUTPATIENT)
Dept: OTHER | Age: 78
End: 2018-10-09

## 2018-10-09 LAB — INR: 1.7

## 2018-10-23 ENCOUNTER — PRIOR ORIGINAL RECORDS (OUTPATIENT)
Dept: OTHER | Age: 78
End: 2018-10-23

## 2018-10-23 LAB — INR: 2.8

## 2018-11-13 ENCOUNTER — PRIOR ORIGINAL RECORDS (OUTPATIENT)
Dept: OTHER | Age: 78
End: 2018-11-13

## 2018-11-13 LAB — INR: 2.1

## 2018-12-04 ENCOUNTER — PRIOR ORIGINAL RECORDS (OUTPATIENT)
Dept: OTHER | Age: 78
End: 2018-12-04

## 2018-12-04 LAB — INR: 2.9

## 2018-12-21 NOTE — PLAN OF CARE
Pt was ICU transfer this AM at 0800. AxO x4, forgetful, very anxious. Received pt on 5L, weaned to room air at 93% spO2, lungs diminished bilaterally. Productive cough with thick yellow sputum, repeat sputum sent due to prior specimen contamination.  On tel SBO    Anticipated discharge date: TBD    Current discharge plan: Home with [facility]    Back up discharge plan: NA 1 person assist

## 2018-12-26 ENCOUNTER — PRIOR ORIGINAL RECORDS (OUTPATIENT)
Dept: OTHER | Age: 78
End: 2018-12-26

## 2018-12-26 LAB — INR: 2.3

## 2019-01-23 ENCOUNTER — PRIOR ORIGINAL RECORDS (OUTPATIENT)
Dept: OTHER | Age: 79
End: 2019-01-23

## 2019-01-23 LAB — INR: 3.9

## 2019-01-30 ENCOUNTER — PRIOR ORIGINAL RECORDS (OUTPATIENT)
Dept: OTHER | Age: 79
End: 2019-01-30

## 2019-01-30 LAB — INR: 2.5

## 2019-02-28 ENCOUNTER — ANTI-COAG (OUTPATIENT)
Dept: CARDIOLOGY | Age: 79
End: 2019-02-28

## 2019-02-28 ENCOUNTER — PRIOR ORIGINAL RECORDS (OUTPATIENT)
Dept: OTHER | Age: 79
End: 2019-02-28

## 2019-02-28 VITALS
SYSTOLIC BLOOD PRESSURE: 110 MMHG | DIASTOLIC BLOOD PRESSURE: 70 MMHG | WEIGHT: 150 LBS | HEIGHT: 71 IN | BODY MASS INDEX: 21 KG/M2 | HEART RATE: 56 BPM

## 2019-02-28 VITALS
SYSTOLIC BLOOD PRESSURE: 142 MMHG | BODY MASS INDEX: 24.64 KG/M2 | WEIGHT: 176 LBS | HEIGHT: 71 IN | DIASTOLIC BLOOD PRESSURE: 72 MMHG | HEART RATE: 68 BPM

## 2019-02-28 VITALS — DIASTOLIC BLOOD PRESSURE: 50 MMHG | SYSTOLIC BLOOD PRESSURE: 100 MMHG | WEIGHT: 154 LBS | HEART RATE: 56 BPM

## 2019-02-28 DIAGNOSIS — I48.91 ATRIAL FIBRILLATION, UNSPECIFIED TYPE (CMD): ICD-10-CM

## 2019-02-28 LAB — INR: 2.7

## 2019-03-01 VITALS
SYSTOLIC BLOOD PRESSURE: 142 MMHG | DIASTOLIC BLOOD PRESSURE: 68 MMHG | WEIGHT: 173 LBS | HEART RATE: 60 BPM | HEIGHT: 71 IN | BODY MASS INDEX: 24.22 KG/M2

## 2019-03-02 PROBLEM — I48.91 UNSPECIFIED ATRIAL FIBRILLATION (CMD): Status: ACTIVE | Noted: 2019-03-02

## 2019-03-27 ENCOUNTER — ANTI-COAG (OUTPATIENT)
Dept: CARDIOLOGY | Age: 79
End: 2019-03-27

## 2019-03-27 DIAGNOSIS — I48.91 ATRIAL FIBRILLATION, UNSPECIFIED TYPE (CMD): ICD-10-CM

## 2019-03-27 LAB — INR PPP: 3

## 2019-04-24 ENCOUNTER — ANTI-COAG (OUTPATIENT)
Dept: CARDIOLOGY | Age: 79
End: 2019-04-24

## 2019-04-24 DIAGNOSIS — I48.91 ATRIAL FIBRILLATION, UNSPECIFIED TYPE (CMD): ICD-10-CM

## 2019-04-24 LAB — INR PPP: 3.1

## 2019-05-15 ENCOUNTER — ANTI-COAG (OUTPATIENT)
Dept: CARDIOLOGY | Age: 79
End: 2019-05-15

## 2019-05-15 DIAGNOSIS — I48.91 ATRIAL FIBRILLATION, UNSPECIFIED TYPE (CMD): ICD-10-CM

## 2019-05-15 LAB — INR PPP: 2.3

## 2019-06-13 ENCOUNTER — ANTI-COAG (OUTPATIENT)
Dept: CARDIOLOGY | Age: 79
End: 2019-06-13

## 2019-06-13 DIAGNOSIS — I48.91 ATRIAL FIBRILLATION, UNSPECIFIED TYPE (CMD): ICD-10-CM

## 2019-06-13 LAB — INR PPP: 3.1

## 2019-06-27 ENCOUNTER — ANTI-COAG (OUTPATIENT)
Dept: CARDIOLOGY | Age: 79
End: 2019-06-27

## 2019-06-27 DIAGNOSIS — I48.91 ATRIAL FIBRILLATION, UNSPECIFIED TYPE (CMD): ICD-10-CM

## 2019-06-27 LAB — INR PPP: 3.1

## 2019-07-03 ENCOUNTER — ANTI-COAG (OUTPATIENT)
Dept: CARDIOLOGY | Age: 79
End: 2019-07-03

## 2019-07-03 DIAGNOSIS — I48.91 ATRIAL FIBRILLATION, UNSPECIFIED TYPE (CMD): ICD-10-CM

## 2020-06-23 ENCOUNTER — APPOINTMENT (OUTPATIENT)
Dept: CARDIOLOGY | Age: 80
End: 2020-06-23

## 2020-06-23 RX ORDER — METOPROLOL SUCCINATE 25 MG/1
TABLET, EXTENDED RELEASE ORAL
COMMUNITY
Start: 2020-06-20

## 2020-06-23 RX ORDER — SPIRONOLACTONE 25 MG/1
TABLET ORAL
COMMUNITY
Start: 2020-06-20

## 2020-06-23 RX ORDER — PANTOPRAZOLE SODIUM 40 MG/1
TABLET, DELAYED RELEASE ORAL
COMMUNITY
Start: 2020-06-20

## 2020-06-23 RX ORDER — FUROSEMIDE 20 MG/1
TABLET ORAL
COMMUNITY
Start: 2020-06-20

## 2020-06-23 RX ORDER — ASPIRIN 81 MG/1
TABLET ORAL
COMMUNITY
Start: 2019-08-22

## 2020-06-23 RX ORDER — MEMANTINE HYDROCHLORIDE 10 MG/1
TABLET ORAL
COMMUNITY
Start: 2020-06-20

## 2020-06-23 RX ORDER — ESCITALOPRAM OXALATE 10 MG/1
TABLET ORAL
COMMUNITY
Start: 2020-06-20

## 2020-06-23 RX ORDER — LANOLIN ALCOHOL/MO/W.PET/CERES
CREAM (GRAM) TOPICAL
COMMUNITY
Start: 2018-08-03

## 2020-06-23 RX ORDER — DONEPEZIL HYDROCHLORIDE 10 MG/1
TABLET, FILM COATED ORAL
COMMUNITY
Start: 2020-06-20

## 2020-06-23 RX ORDER — WARFARIN SODIUM 4 MG/1
TABLET ORAL
COMMUNITY
Start: 2020-06-03

## 2020-07-06 ENCOUNTER — OFFICE VISIT (OUTPATIENT)
Dept: CARDIOLOGY | Age: 80
End: 2020-07-06

## 2020-07-06 VITALS
SYSTOLIC BLOOD PRESSURE: 100 MMHG | HEIGHT: 71 IN | WEIGHT: 147 LBS | HEART RATE: 48 BPM | DIASTOLIC BLOOD PRESSURE: 62 MMHG | BODY MASS INDEX: 20.58 KG/M2

## 2020-07-06 DIAGNOSIS — Z98.61 HISTORY OF PTCA: Primary | ICD-10-CM

## 2020-07-06 DIAGNOSIS — I48.21 PERMANENT ATRIAL FIBRILLATION (CMD): ICD-10-CM

## 2020-07-06 DIAGNOSIS — Z79.01 ANTICOAGULATED ON COUMADIN: ICD-10-CM

## 2020-07-06 DIAGNOSIS — I25.10 ATHEROSCLEROSIS OF NATIVE CORONARY ARTERY OF NATIVE HEART WITHOUT ANGINA PECTORIS: ICD-10-CM

## 2020-07-06 PROBLEM — I50.812 CHRONIC RIGHT-SIDED CONGESTIVE HEART FAILURE (CMD): Status: ACTIVE | Noted: 2018-08-03

## 2020-07-06 PROCEDURE — 99215 OFFICE O/P EST HI 40 MIN: CPT | Performed by: INTERNAL MEDICINE

## 2020-07-06 ASSESSMENT — ENCOUNTER SYMPTOMS
FEVER: 0
ALLERGIC/IMMUNOLOGIC COMMENTS: NO NEW FOOD ALLERGIES
COUGH: 0
SUSPICIOUS LESIONS: 0
BRUISES/BLEEDS EASILY: 0
CHILLS: 0
WEIGHT GAIN: 0
HEMATOCHEZIA: 0
WEIGHT LOSS: 1
HEMOPTYSIS: 0

## 2020-07-06 ASSESSMENT — PATIENT HEALTH QUESTIONNAIRE - PHQ9
SUM OF ALL RESPONSES TO PHQ9 QUESTIONS 1 AND 2: 0
2. FEELING DOWN, DEPRESSED OR HOPELESS: NOT AT ALL
CLINICAL INTERPRETATION OF PHQ2 SCORE: NO FURTHER SCREENING NEEDED
1. LITTLE INTEREST OR PLEASURE IN DOING THINGS: NOT AT ALL
SUM OF ALL RESPONSES TO PHQ9 QUESTIONS 1 AND 2: 0
CLINICAL INTERPRETATION OF PHQ9 SCORE: NO FURTHER SCREENING NEEDED

## 2020-07-09 PROBLEM — F02.80 ALZHEIMER'S DISEASE OF OTHER ONSET WITHOUT BEHAVIORAL DISTURBANCE: Status: ACTIVE | Noted: 2020-07-09

## 2020-07-09 PROBLEM — G30.8 ALZHEIMER'S DISEASE OF OTHER ONSET WITHOUT BEHAVIORAL DISTURBANCE: Status: ACTIVE | Noted: 2020-07-09

## 2020-07-09 PROBLEM — R63.4 WEIGHT LOSS, NON-INTENTIONAL: Status: ACTIVE | Noted: 2020-07-09

## 2021-02-10 ENCOUNTER — TELEPHONE (OUTPATIENT)
Dept: OTHER | Age: 81
End: 2021-02-10

## 2021-02-18 ENCOUNTER — TELEPHONE (OUTPATIENT)
Dept: OTHER | Age: 81
End: 2021-02-18

## 2021-03-01 ENCOUNTER — TELEPHONE (OUTPATIENT)
Dept: OTHER | Age: 81
End: 2021-03-01

## 2021-04-26 NOTE — PROGRESS NOTES
Simone Carlos, 14/1940, 77year old, male    Chief Complaint:  Patient presents with:   Follow - Up  Weakness  Lab Results  [facility] Admit date:  7/17/18  Discharge date to [facility]:  7/25/18  ELOS:  11-14 days  Anticipated d weakness improved, detailed above  EXTREMITIES/VASCULAR:no cyanosis, clubbing or edema  NEUROLOGIC: follows commands, hx dementia  PSYCHIATRIC: alert and oriented to self    Medications reviewed: Yes    Diagnostics reviewed:    7/30/18:  Mag=1.7  INR=2.2 to increased fall risk and fall history     Follow-ups after JOY discharge:   Follow-Up with PCP, Dr. Armaan Hendricks, within 1-2 weeks following JOY discharge.     Follow-Up with specialists as recommended.     Marina Nieves MD  [address] [facility] DISPLAY PLAN FREE TEXT DISPLAY PLAN FREE TEXT DISPLAY PLAN FREE TEXT DISPLAY PLAN FREE TEXT DISPLAY PLAN FREE TEXT

## 2022-04-08 NOTE — PROGRESS NOTES
Marcella Norris 1284 Patient Status:  Inpatient    3/4/1940 MRN [id number]   [facility] 5NW-A Attending Rg Phillips MD   [address] Day # 4 PCP Marilyn Martin MD     SUBJECTIVE: No acute events overnight.   Has had 3 Refill Routing Note   Medication(s) are not appropriate for processing by [facility] for the following reason(s):      - Required vitals are abnormal  - Patient has been seen in the Emergency Room/Department since the last PCP visit    [facility] action(s):  Defer          Medication reconciliation completed: No     Appointments  past 12m or future 3m with PCP    Date Provider   Last Visit   1/3/2022 Andriy Moran MD   Next Visit   Visit date not found Andriy Moran MD   ED visits in past 90 days: 0        Note composed:7:42 AM 04/08/2022            no edema                          Skin: No rashes or lesions.        Recent Labs   Lab  10/30/17   1753  10/31/17   0437  11/01/17   0417  11/02/17   0639   GLU  113*  108*  94  100*   BUN  52*  62*  43*  32*   CREATSERUM  3.50*  2.65*  1.28  1.16   CA  8.3

## 2023-12-26 ENCOUNTER — HOSPITAL ENCOUNTER (INPATIENT)
Facility: HOSPITAL | Age: 83
LOS: 6 days | Discharge: NURSING, REHAB OR LONG-TERM CARE | End: 2024-01-02
Attending: EMERGENCY MEDICINE | Admitting: HOSPITALIST
Payer: MEDICARE

## 2023-12-26 ENCOUNTER — APPOINTMENT (OUTPATIENT)
Dept: CT IMAGING | Facility: HOSPITAL | Age: 83
End: 2023-12-26
Attending: EMERGENCY MEDICINE
Payer: MEDICARE

## 2023-12-26 ENCOUNTER — APPOINTMENT (OUTPATIENT)
Dept: GENERAL RADIOLOGY | Facility: HOSPITAL | Age: 83
End: 2023-12-26
Attending: EMERGENCY MEDICINE
Payer: MEDICARE

## 2023-12-26 DIAGNOSIS — R11.2 NAUSEA AND VOMITING IN ADULT: ICD-10-CM

## 2023-12-26 DIAGNOSIS — E86.0 DEHYDRATION: ICD-10-CM

## 2023-12-26 DIAGNOSIS — D72.829 LEUKOCYTOSIS, UNSPECIFIED TYPE: ICD-10-CM

## 2023-12-26 DIAGNOSIS — K56.609 SMALL BOWEL OBSTRUCTION (HCC): Primary | ICD-10-CM

## 2023-12-26 PROBLEM — E87.6 HYPOKALEMIA: Status: ACTIVE | Noted: 2023-12-26

## 2023-12-26 PROBLEM — R73.9 HYPERGLYCEMIA: Status: ACTIVE | Noted: 2023-12-26

## 2023-12-26 LAB
ALBUMIN SERPL-MCNC: 3.3 G/DL (ref 3.4–5)
ALBUMIN/GLOB SERPL: 0.7 {RATIO} (ref 1–2)
ALP LIVER SERPL-CCNC: 95 U/L
ALT SERPL-CCNC: 15 U/L
ANION GAP SERPL CALC-SCNC: 12 MMOL/L (ref 0–18)
AST SERPL-CCNC: 22 U/L (ref 15–37)
BASOPHILS # BLD AUTO: 0.02 X10(3) UL (ref 0–0.2)
BASOPHILS NFR BLD AUTO: 0.1 %
BILIRUB SERPL-MCNC: 2 MG/DL (ref 0.1–2)
BUN BLD-MCNC: 27 MG/DL (ref 9–23)
CALCIUM BLD-MCNC: 9.4 MG/DL (ref 8.5–10.1)
CHLORIDE SERPL-SCNC: 103 MMOL/L (ref 98–112)
CO2 SERPL-SCNC: 25 MMOL/L (ref 21–32)
CREAT BLD-MCNC: 1.92 MG/DL
EGFRCR SERPLBLD CKD-EPI 2021: 34 ML/MIN/1.73M2 (ref 60–?)
EOSINOPHIL # BLD AUTO: 0 X10(3) UL (ref 0–0.7)
EOSINOPHIL NFR BLD AUTO: 0 %
ERYTHROCYTE [DISTWIDTH] IN BLOOD BY AUTOMATED COUNT: 13.2 %
GLOBULIN PLAS-MCNC: 4.5 G/DL (ref 2.8–4.4)
GLUCOSE BLD-MCNC: 156 MG/DL (ref 70–99)
HCT VFR BLD AUTO: 46.8 %
HGB BLD-MCNC: 16.2 G/DL
IMM GRANULOCYTES # BLD AUTO: 0.15 X10(3) UL (ref 0–1)
IMM GRANULOCYTES NFR BLD: 0.8 %
INR BLD: 3.29 (ref 0.8–1.2)
LIPASE SERPL-CCNC: 12 U/L (ref 13–75)
LYMPHOCYTES # BLD AUTO: 1.17 X10(3) UL (ref 1–4)
LYMPHOCYTES NFR BLD AUTO: 6.2 %
MCH RBC QN AUTO: 33 PG (ref 26–34)
MCHC RBC AUTO-ENTMCNC: 34.6 G/DL (ref 31–37)
MCV RBC AUTO: 95.3 FL
MONOCYTES # BLD AUTO: 1.01 X10(3) UL (ref 0.1–1)
MONOCYTES NFR BLD AUTO: 5.3 %
NEUTROPHILS # BLD AUTO: 16.65 X10 (3) UL (ref 1.5–7.7)
NEUTROPHILS # BLD AUTO: 16.65 X10(3) UL (ref 1.5–7.7)
NEUTROPHILS NFR BLD AUTO: 87.6 %
OSMOLALITY SERPL CALC.SUM OF ELEC: 298 MOSM/KG (ref 275–295)
PLATELET # BLD AUTO: 175 10(3)UL (ref 150–450)
POTASSIUM SERPL-SCNC: 3.5 MMOL/L (ref 3.5–5.1)
PROT SERPL-MCNC: 7.8 G/DL (ref 6.4–8.2)
PROTHROMBIN TIME: 34 SECONDS (ref 11.6–14.8)
RBC # BLD AUTO: 4.91 X10(6)UL
SODIUM SERPL-SCNC: 140 MMOL/L (ref 136–145)
TROPONIN I SERPL HS-MCNC: 23 NG/L
WBC # BLD AUTO: 19 X10(3) UL (ref 4–11)

## 2023-12-26 PROCEDURE — 74177 CT ABD & PELVIS W/CONTRAST: CPT | Performed by: EMERGENCY MEDICINE

## 2023-12-26 PROCEDURE — 0D9670Z DRAINAGE OF STOMACH WITH DRAINAGE DEVICE, VIA NATURAL OR ARTIFICIAL OPENING: ICD-10-PCS | Performed by: HOSPITALIST

## 2023-12-26 PROCEDURE — 71045 X-RAY EXAM CHEST 1 VIEW: CPT | Performed by: EMERGENCY MEDICINE

## 2023-12-26 RX ORDER — ONDANSETRON 2 MG/ML
4 INJECTION INTRAMUSCULAR; INTRAVENOUS ONCE
Status: COMPLETED | OUTPATIENT
Start: 2023-12-26 | End: 2023-12-26

## 2023-12-26 RX ORDER — SODIUM CHLORIDE 9 MG/ML
125 INJECTION, SOLUTION INTRAVENOUS CONTINUOUS
Status: DISCONTINUED | OUTPATIENT
Start: 2023-12-26 | End: 2023-12-28

## 2023-12-27 PROBLEM — R11.2 NAUSEA AND VOMITING IN ADULT: Status: ACTIVE | Noted: 2023-12-27

## 2023-12-27 PROBLEM — E86.0 DEHYDRATION: Status: ACTIVE | Noted: 2023-12-27

## 2023-12-27 PROBLEM — Z71.89 ACP (ADVANCE CARE PLANNING): Status: ACTIVE | Noted: 2023-12-27

## 2023-12-27 PROBLEM — D72.829 LEUKOCYTOSIS, UNSPECIFIED TYPE: Status: ACTIVE | Noted: 2023-12-27

## 2023-12-27 LAB
ALBUMIN SERPL-MCNC: 2.4 G/DL (ref 3.4–5)
ALBUMIN/GLOB SERPL: 0.6 {RATIO} (ref 1–2)
ALP LIVER SERPL-CCNC: 71 U/L
ALT SERPL-CCNC: 10 U/L
ANION GAP SERPL CALC-SCNC: 8 MMOL/L (ref 0–18)
AST SERPL-CCNC: 20 U/L (ref 15–37)
BILIRUB SERPL-MCNC: 0.9 MG/DL (ref 0.1–2)
BUN BLD-MCNC: 27 MG/DL (ref 9–23)
CALCIUM BLD-MCNC: 8.9 MG/DL (ref 8.5–10.1)
CHLORIDE SERPL-SCNC: 110 MMOL/L (ref 98–112)
CO2 SERPL-SCNC: 25 MMOL/L (ref 21–32)
CREAT BLD-MCNC: 1.61 MG/DL
EGFRCR SERPLBLD CKD-EPI 2021: 42 ML/MIN/1.73M2 (ref 60–?)
ERYTHROCYTE [DISTWIDTH] IN BLOOD BY AUTOMATED COUNT: 13.4 %
GLOBULIN PLAS-MCNC: 3.9 G/DL (ref 2.8–4.4)
GLUCOSE BLD-MCNC: 115 MG/DL (ref 70–99)
HCT VFR BLD AUTO: 39.3 %
HGB BLD-MCNC: 13.2 G/DL
INR BLD: 3.98 (ref 0.8–1.2)
LACTATE SERPL-SCNC: 1.6 MMOL/L (ref 0.4–2)
MCH RBC QN AUTO: 32.4 PG (ref 26–34)
MCHC RBC AUTO-ENTMCNC: 33.6 G/DL (ref 31–37)
MCV RBC AUTO: 96.3 FL
OSMOLALITY SERPL CALC.SUM OF ELEC: 302 MOSM/KG (ref 275–295)
PLATELET # BLD AUTO: 148 10(3)UL (ref 150–450)
POTASSIUM SERPL-SCNC: 3.3 MMOL/L (ref 3.5–5.1)
PROT SERPL-MCNC: 6.3 G/DL (ref 6.4–8.2)
PROTHROMBIN TIME: 39.5 SECONDS (ref 11.6–14.8)
RBC # BLD AUTO: 4.08 X10(6)UL
SODIUM SERPL-SCNC: 143 MMOL/L (ref 136–145)
WBC # BLD AUTO: 12 X10(3) UL (ref 4–11)

## 2023-12-27 PROCEDURE — 99223 1ST HOSP IP/OBS HIGH 75: CPT | Performed by: HOSPITALIST

## 2023-12-27 PROCEDURE — 99223 1ST HOSP IP/OBS HIGH 75: CPT | Performed by: SURGERY

## 2023-12-27 PROCEDURE — 99497 ADVNCD CARE PLAN 30 MIN: CPT | Performed by: HOSPITALIST

## 2023-12-27 RX ORDER — MELATONIN
1000 DAILY
COMMUNITY

## 2023-12-27 RX ORDER — LORAZEPAM 0.5 MG/1
0.5 TABLET ORAL DAILY PRN
COMMUNITY

## 2023-12-27 RX ORDER — CALCIUM CARBONATE 500 MG/1
1 TABLET, CHEWABLE ORAL EVERY 6 HOURS PRN
COMMUNITY

## 2023-12-27 RX ORDER — DEXTROSE, SODIUM CHLORIDE, SODIUM LACTATE, POTASSIUM CHLORIDE, AND CALCIUM CHLORIDE 5; .6; .31; .03; .02 G/100ML; G/100ML; G/100ML; G/100ML; G/100ML
INJECTION, SOLUTION INTRAVENOUS CONTINUOUS
Status: DISCONTINUED | OUTPATIENT
Start: 2023-12-27 | End: 2023-12-28

## 2023-12-27 RX ORDER — ONDANSETRON 2 MG/ML
4 INJECTION INTRAMUSCULAR; INTRAVENOUS EVERY 6 HOURS PRN
Status: DISCONTINUED | OUTPATIENT
Start: 2023-12-27 | End: 2024-01-02

## 2023-12-27 RX ORDER — METOCLOPRAMIDE HYDROCHLORIDE 5 MG/ML
10 INJECTION INTRAMUSCULAR; INTRAVENOUS EVERY 8 HOURS PRN
Status: DISCONTINUED | OUTPATIENT
Start: 2023-12-27 | End: 2024-01-02

## 2023-12-27 RX ORDER — LOPERAMIDE HYDROCHLORIDE 2 MG/1
2 CAPSULE ORAL 4 TIMES DAILY PRN
COMMUNITY

## 2023-12-27 RX ORDER — ACETAMINOPHEN 500 MG
1000 TABLET ORAL EVERY 4 HOURS PRN
Status: DISCONTINUED | OUTPATIENT
Start: 2023-12-27 | End: 2024-01-02

## 2023-12-27 RX ORDER — METOPROLOL TARTRATE 1 MG/ML
2.5 INJECTION, SOLUTION INTRAVENOUS EVERY 6 HOURS
Status: DISCONTINUED | OUTPATIENT
Start: 2023-12-27 | End: 2023-12-29

## 2023-12-27 RX ORDER — MELATONIN
3 NIGHTLY PRN
Status: DISCONTINUED | OUTPATIENT
Start: 2023-12-27 | End: 2024-01-02

## 2023-12-27 RX ORDER — SODIUM CHLORIDE 9 MG/ML
INJECTION, SOLUTION INTRAVENOUS CONTINUOUS
Status: DISCONTINUED | OUTPATIENT
Start: 2023-12-27 | End: 2023-12-27

## 2023-12-27 RX ORDER — MULTIVIT-MIN/IRON FUM/FOLIC AC 7.5 MG-4
1 TABLET ORAL DAILY
COMMUNITY

## 2023-12-27 RX ORDER — ECHINACEA PURPUREA EXTRACT 125 MG
1 TABLET ORAL
Status: DISCONTINUED | OUTPATIENT
Start: 2023-12-27 | End: 2024-01-02

## 2023-12-27 RX ORDER — LORATADINE 10 MG/1
10 TABLET ORAL DAILY
COMMUNITY

## 2023-12-27 RX ORDER — DOCUSATE SODIUM 100 MG/1
100 CAPSULE, LIQUID FILLED ORAL 2 TIMES DAILY PRN
COMMUNITY

## 2023-12-27 RX ORDER — METOPROLOL TARTRATE 1 MG/ML
5 INJECTION, SOLUTION INTRAVENOUS EVERY 6 HOURS
Status: DISCONTINUED | OUTPATIENT
Start: 2023-12-27 | End: 2023-12-27

## 2023-12-27 RX ORDER — MORPHINE SULFATE 4 MG/ML
4 INJECTION, SOLUTION INTRAMUSCULAR; INTRAVENOUS EVERY 30 MIN PRN
Status: DISCONTINUED | OUTPATIENT
Start: 2023-12-27 | End: 2023-12-27

## 2023-12-27 RX ORDER — ONDANSETRON 2 MG/ML
4 INJECTION INTRAMUSCULAR; INTRAVENOUS EVERY 4 HOURS PRN
Status: DISCONTINUED | OUTPATIENT
Start: 2023-12-27 | End: 2023-12-27

## 2023-12-27 RX ORDER — ACETAMINOPHEN 325 MG/1
650 TABLET ORAL EVERY 6 HOURS PRN
COMMUNITY

## 2023-12-27 NOTE — PLAN OF CARE
NURSING ADMISSION NOTE      Patient admitted via Cart from ed.   Oriented to room.  Safety precautions initiated.  Bed in low position.  Call light in reach.

## 2023-12-27 NOTE — ED PROVIDER NOTES
Patient Seen in: [facility] Emergency Department      History     Chief Complaint   Patient presents with    Nausea/Vomiting/Diarrhea     Stated Complaint: n/v    Subjective:   HPI    This is a 83-year-old male who arrives here with complaints of nausea, vomiting.  The patient from an nursing home [facility].  The patient daughter is here the patient is got Alzheimer's really unable to give me too much more information.  The patient arrives here because he had 1 or 2 episodes of diarrhea and at least 2 episodes of vomiting.  The patient is unable to give me any other history.  He does not complain of anything.  He does have history of obstruction.  He has a history of high blood pressure previous history of heart attack, esophageal reflux, anxiety he denies any headache or chest pain or shortness of breath or abdominal pain no fever.    Objective:   Past Medical History:   Diagnosis Date    Anxiety 11/18/2013    Arrhythmia     afib after cabg, resolved now    ASHD (arteriosclerotic heart disease) 10/8/2012    Atherosclerosis of coronary artery     [place] 11/18/2013    Coronary atherosclerosis of unspecified type of vessel, native or graft 1994    S/P MI, angioplasty LAD    Esophageal reflux     Essential hypertension     Hearing impairment     Heart attack (HCC) 1974    Hernia     will be having surgery in the future    High blood pressure     High cholesterol     Hyperlipidemia     Insomnia 11/18/2013    Myocardial infarction (HCC)     1974    Obstructive sleep apnea (adult) (pediatric) SPLIT NIGHT 5-1-16    AHI 6 SaO2 gary 92 % CPAP 8     Unspecified sleep apnea     Visual impairment               Past Surgical History:   Procedure Laterality Date    ADENOIDECTOMY      palate surgery,deviated septum    ANGIOPLASTY (CORONARY)      one vessel    APPENDECTOMY      CABG  6/2014    4 bypass    CATH PERCUTANEOUS  TRANSLUMINAL CORONARY ANGIOPLASTY      COLECTOMY  12/2014    for bowel obstruction    COLON SURGERY       COLONOSCOPY,DIAGNOSTIC  5/23/03    normal    COLONOSCOPY,DIAGNOSTIC  3/16/13    descending polyp  at 40 cm    DRAIN/INJECT LARGE JOINT/BURSA  1/8/2014    Procedure: BURSA INJECTION;  Surgeon: Alejandro Mcmillan MD;  [facility] [facility]    FLUOROSCOPIC GUIDANCE NEEDLE PLACEMENT  1/8/2014    Procedure: LUMBAR FACET INJECTION OR MEDIAL BRANCH NERVE BLOCK;  Surgeon: Alejandro Mcmillan MD;  [facility] [facility]    HERNIA SURGERY      INGUINAL HERNIA REPAIR Left 4/13/2015    Procedure: HERNIA INGUINAL REPAIR ADULT;  Surgeon: Raheem Goff MD;  Location: [facility] MAIN OR    INJ PARAVERT F JNT L/S 1 LEV Right 8/5/2015    Procedure: LUMBAR FACET INJECTION OR MEDIAL BRANCH NERVE BLOCK;  Surgeon: Diony Zuniga MD;  Location: [facility]    INJ PARAVERT F JNT L/S 1 LEV Right 1/14/2016    Procedure: LUMBAR FACET INJECTION OR MEDIAL BRANCH NERVE BLOCK;  Surgeon: Diony Zuniga MD;  Location: [facility]    INJ PARAVERT F JNT L/S 1 LEV Right 2/1/2016    Procedure: LUMBAR FACET INJECTION OR MEDIAL BRANCH NERVE BLOCK;  Surgeon: Diony Zuniga MD;  [facility]: [facility]    INJ PARAVERT F JNT L/S 2 LEV Right 8/5/2015    Procedure: LUMBAR FACET INJECTION OR MEDIAL BRANCH NERVE BLOCK;  Surgeon: Diony Zuniga MD;  [facility]: [facility]    INJ PARAVERT F JNT L/S 2 LEV Right 1/14/2016    Procedure: LUMBAR FACET INJECTION OR MEDIAL BRANCH NERVE BLOCK;  Surgeon: Diony Zuniga MD;  [facility]: [facility]    INJ PARAVERT F JNT L/S 2 LEV Right 2/1/2016    Procedure: LUMBAR FACET INJECTION OR MEDIAL BRANCH NERVE BLOCK;  Surgeon: Diony Zuniga MD;  [facility]: [facility]    INJ PARAVERT F JNT L/S 3 LEV Right 8/5/2015    Procedure: LUMBAR FACET INJECTION OR MEDIAL BRANCH NERVE BLOCK;  Surgeon: Diony Zuniga MD;  Location: [facility]    INJECTION, ANESTHETIC/STEROID,  TRANSFORAMINAL EPIDURAL; LUMBAR/SACRAL, ADD'L LEVEL N/A 8/20/2015    Procedure: TRANSFORAMINAL EPIDURAL - LUMBAR;  Surgeon: Alejandro Mcmillan MD;  [place] [facility]    INJECTION, ANESTHETIC/STEROID, TRANSFORAMINAL EPIDURAL; LUMBAR/SACRAL, ADD'L LEVEL Right 9/4/2015    Procedure: TRANSFORAMINAL EPIDURAL - LUMBAR;  Surgeon: Alejandro Mcmillan MD;  Location: [facility]    INJECTION, ANESTHETIC/STEROID, TRANSFORAMINAL EPIDURAL; LUMBAR/SACRAL, ADD'L LEVEL Right 3/2/2016    Procedure: TRANSFORAMINAL EPIDURAL - LUMBAR;  Surgeon: Diony Zuniga MD;  Location: [facility]    INJECTION, ANESTHETIC/STEROID, TRANSFORAMINAL EPIDURAL; LUMBAR/SACRAL, ADD'L LEVEL Right 3/10/2016    Procedure: TRANSFORAMINAL EPIDURAL - LUMBAR;  Surgeon: Diony Zuniga MD;  Location: [facility]    INJECTION, ANESTHETIC/STEROID, TRANSFORAMINAL EPIDURAL; LUMBAR/SACRAL, ADD'L LEVEL Right 3/29/2016    Procedure: TRANSFORAMINAL EPIDURAL - LUMBAR;  Surgeon: Diony Zuniga MD;  Location: [facility]    INJECTION, ANESTHETIC/STEROID, TRANSFORAMINAL EPIDURAL; LUMBAR/SACRAL, SINGLE LEVEL N/A 8/20/2015    Procedure: TRANSFORAMINAL EPIDURAL - LUMBAR;  Surgeon: Alejandro Mcmillan MD;  Location: [facility]    INJECTION, ANESTHETIC/STEROID, TRANSFORAMINAL EPIDURAL; LUMBAR/SACRAL, SINGLE LEVEL Right 9/4/2015    Procedure: TRANSFORAMINAL EPIDURAL - LUMBAR;  Surgeon: Alejandro Mcmillan MD;  Location: [facility]    INJECTION, ANESTHETIC/STEROID, TRANSFORAMINAL EPIDURAL; LUMBAR/SACRAL, SINGLE LEVEL Right 3/2/2016    Procedure: TRANSFORAMINAL EPIDURAL - LUMBAR;  Surgeon: Diony Zuniga MD;  Location: [facility]    INJECTION, ANESTHETIC/STEROID, TRANSFORAMINAL EPIDURAL; LUMBAR/SACRAL, SINGLE LEVEL Right 3/10/2016    Procedure: TRANSFORAMINAL EPIDURAL - LUMBAR;  Surgeon: Diony Zuniga MD;  Location: [facility]  [facility]    INJECTION, ANESTHETIC/STEROID, TRANSFORAMINAL EPIDURAL; LUMBAR/SACRAL, SINGLE LEVEL Right 3/29/2016    Procedure: TRANSFORAMINAL EPIDURAL - LUMBAR;  Surgeon: Diony Zuniga MD;  Location: [facility]    INJECTION, W/WO CONTRAST, DX/THERAPEUTIC SUBSTANCE, EPIDURAL/SUBARACHNOID; LUMBAR/SACRAL N/A 6/17/2015    Procedure: LUMBAR EPIDURAL;  Surgeon: Alejandro Mcmillan MD;  Location: [facility]    INJECTION, W/WO CONTRAST, DX/THERAPEUTIC SUBSTANCE, EPIDURAL/SUBARACHNOID; LUMBAR/SACRAL N/A 9/18/2015    Procedure: LUMBAR EPIDURAL;  Surgeon: Diony Zuniga MD;  Location: [facility]    INJECTION, W/WO CONTRAST, DX/THERAPEUTIC SUBSTANCE, EPIDURAL/SUBARACHNOID; LUMBAR/SACRAL N/A 10/2/2015    Procedure: LUMBAR EPIDURAL;  Surgeon: Diony Zuniga MD;  Location: [facility]    OPEN HEART SURGICAL PROFILE      OTHER SURGICAL HISTORY      Tonsillectomy uvelectomy     OTHER SURGICAL HISTORY  10/2015    surgery for bowel obstruction    PATIENT DOCUMENTED NOT TO HAVE EXPERIENCED ANY OF THE FOLLOWING EVENTS  1/8/2014    Procedure: LUMBAR FACET INJECTION OR MEDIAL BRANCH NERVE BLOCK;  Surgeon: Alejandro Mcmillan MD;  Location: [facility]    PATIENT DOCUMENTED NOT TO HAVE EXPERIENCED ANY OF THE FOLLOWING EVENTS N/A 6/17/2015    Procedure: LUMBAR EPIDURAL;  Surgeon: Alejandro Mcmillan MD;  Location: [facility]    PATIENT DOCUMENTED NOT TO HAVE EXPERIENCED ANY OF THE FOLLOWING EVENTS Right 8/5/2015    Procedure: LUMBAR FACET INJECTION OR MEDIAL BRANCH NERVE BLOCK;  Surgeon: Diony Zuniga MD;  Location: [facility]    PATIENT DOCUMENTED NOT TO HAVE EXPERIENCED ANY OF THE FOLLOWING EVENTS N/A 8/20/2015    Procedure: TRANSFORAMINAL EPIDURAL - LUMBAR;  Surgeon: Alejandro Mcmillan MD;  Location: [facility]    PATIENT DOCUMENTED NOT TO HAVE EXPERIENCED ANY OF THE  FOLLOWING EVENTS Right 9/4/2015    Procedure: TRANSFORAMINAL EPIDURAL - LUMBAR;  Surgeon: Alejandro Mcmillan MD;  Location: [facility]    PATIENT DOCUMENTED NOT TO HAVE EXPERIENCED ANY OF THE FOLLOWING EVENTS N/A 9/18/2015    Procedure: LUMBAR EPIDURAL;  Surgeon: Diony Zuniga MD;  Location: [facility]    PATIENT DOCUMENTED NOT TO HAVE EXPERIENCED ANY OF THE FOLLOWING EVENTS N/A 10/2/2015    Procedure: LUMBAR EPIDURAL;  Surgeon: Diony Zuniga MD;  Location: [facility]    PATIENT DOCUMENTED NOT TO HAVE EXPERIENCED ANY OF THE FOLLOWING EVENTS Right 1/14/2016    Procedure: LUMBAR FACET INJECTION OR MEDIAL BRANCH NERVE BLOCK;  Surgeon: Diony Zuniga MD;  Location: [facility]    PATIENT DOCUMENTED NOT TO HAVE EXPERIENCED ANY OF THE FOLLOWING EVENTS Right 2/1/2016    Procedure: LUMBAR FACET INJECTION OR MEDIAL BRANCH NERVE BLOCK;  Surgeon: Diony Zuniga MD;  Location: [facility]    PATIENT DOCUMENTED NOT TO HAVE EXPERIENCED ANY OF THE FOLLOWING EVENTS Right 3/2/2016    Procedure: TRANSFORAMINAL EPIDURAL - LUMBAR;  Surgeon: Diony Zuniga MD;  Location: [facility]    PATIENT DOCUMENTED NOT TO HAVE EXPERIENCED ANY OF THE FOLLOWING EVENTS Right 3/10/2016    Procedure: TRANSFORAMINAL EPIDURAL - LUMBAR;  Surgeon: Diony Zuniga MD;  Location: [facility]    PATIENT DOCUMENTED NOT TO HAVE EXPERIENCED ANY OF THE FOLLOWING EVENTS Right 3/29/2016    Procedure: TRANSFORAMINAL EPIDURAL - LUMBAR;  Surgeon: Diony Zuniga MD;  Location: [facility]    PATIENT WITHOUGH PREOPERATIVE ORDER FOR IV ANTIBIOTIC SURGICAL SITE INFECTION PROPHYLAXIS.  1/8/2014    Procedure: LUMBAR FACET INJECTION OR MEDIAL BRANCH NERVE BLOCK;  Surgeon: Alejandro Mmcillan MD;  Location: [facility]    PATIENT WITHOUGH PREOPERATIVE ORDER FOR IV ANTIBIOTIC SURGICAL SITE INFECTION  PROPHYLAXIS. N/A 6/17/2015    Procedure: LUMBAR EPIDURAL;  Surgeon: Alejandro Mcmillan MD;  Location: [facility]    PATIENT WITHOUGH PREOPERATIVE ORDER FOR IV ANTIBIOTIC SURGICAL SITE INFECTION PROPHYLAXIS. Right 8/5/2015    Procedure: LUMBAR FACET INJECTION OR MEDIAL BRANCH NERVE BLOCK;  Surgeon: Diony Zuniga MD;  Location: [facility]    PATIENT WITHOUGH PREOPERATIVE ORDER FOR IV ANTIBIOTIC SURGICAL SITE INFECTION PROPHYLAXIS. N/A 8/20/2015    Procedure: TRANSFORAMINAL EPIDURAL - LUMBAR;  Surgeon: Alejandro Mcmillan MD;  Location: [facility]    PATIENT WITHOUGH PREOPERATIVE ORDER FOR IV ANTIBIOTIC SURGICAL SITE INFECTION PROPHYLAXIS. Right 9/4/2015    Procedure: TRANSFORAMINAL EPIDURAL - LUMBAR;  Surgeon: Alejandro Mcmillan MD;  Location: [facility]    PATIENT WITHOUGH PREOPERATIVE ORDER FOR IV ANTIBIOTIC SURGICAL SITE INFECTION PROPHYLAXIS. N/A 9/18/2015    Procedure: LUMBAR EPIDURAL;  Surgeon: Diony Zuniga MD;  Location: [facility]    PATIENT WITHOUGH PREOPERATIVE ORDER FOR IV ANTIBIOTIC SURGICAL SITE INFECTION PROPHYLAXIS. N/A 10/2/2015    Procedure: LUMBAR EPIDURAL;  Surgeon: Diony Zuniga MD;  Location: [facility]    PATIENT WITHOUGH PREOPERATIVE ORDER FOR IV ANTIBIOTIC SURGICAL SITE INFECTION PROPHYLAXIS. Right 1/14/2016    Procedure: LUMBAR FACET INJECTION OR MEDIAL BRANCH NERVE BLOCK;  Surgeon: Diony Zuniga MD;  Location: [facility]    PATIENT WITHOUGH PREOPERATIVE ORDER FOR IV ANTIBIOTIC SURGICAL SITE INFECTION PROPHYLAXIS. Right 2/1/2016    Procedure: LUMBAR FACET INJECTION OR MEDIAL BRANCH NERVE BLOCK;  Surgeon: Diony Zuniga MD;  Location: [facility]    PATIENT WITHOUGH PREOPERATIVE ORDER FOR IV ANTIBIOTIC SURGICAL SITE INFECTION PROPHYLAXIS. Right 3/2/2016    Procedure: TRANSFORAMINAL EPIDURAL - LUMBAR;  Surgeon: Diony Zuniga MD;   Location: [facility]    PATIENT WITHOUGH PREOPERATIVE ORDER FOR IV ANTIBIOTIC SURGICAL SITE INFECTION PROPHYLAXIS. Right 3/10/2016    Procedure: TRANSFORAMINAL EPIDURAL - LUMBAR;  Surgeon: Diony Zuniga MD;  Location: [facility]    PATIENT WITHOUGH PREOPERATIVE ORDER FOR IV ANTIBIOTIC SURGICAL SITE INFECTION PROPHYLAXIS. Right 3/29/2016    Procedure: TRANSFORAMINAL EPIDURAL - LUMBAR;  Surgeon: Diony Zuniga MD;  Location: [facility]    SINUS SURGERY        deviated septum    SPECIAL SERVICE OR REPORT  2002    S/P uvulopalatoplasty, sinus surgery, septoplasty                Social History     Socioeconomic History    Marital status:    Tobacco Use    Smoking status: Never    Smokeless tobacco: Never   Substance and Sexual Activity    Alcohol use: Yes     Alcohol/week: 0.0 standard drinks of alcohol     Comment: 0-2 per month    Drug use: No              Review of Systems    Positive for stated complaint: n/v  Other systems are as noted in HPI.  Constitutional and vital signs reviewed.      All other systems reviewed and negative except as noted above.    Physical Exam     ED Triage Vitals [12/26/23 2116]   /81   Pulse 97   Resp 18   Temp 97.8 °F (36.6 °C)   Temp src Temporal   SpO2 93 %   O2 Device None (Room air)       Current:/68   Pulse 75   Temp 97.8 °F (36.6 °C) (Temporal)   Resp 21   SpO2 93%         Physical Exam    General: .  Patient is of no respiratory distress.  The patient is in no respiratory distress. The patient is not septic or toxic    HEENT: Atraumatic, conjunctiva are not pale.  There is no icterus.  Oral mucosa Is wet.  No facial trauma.  The neck is supple.  Lips are slightly dry.  Alert and follows some simple commands.    LUNGS: Clear to auscultation, there is no wheezing or retraction.  No crackles.    CV: Cardiovascular is regular without murmurs or rubs.    ABD: The abdomen is soft diffuse tenderness  throughout the abdomen.  There is no rebound.  There is no guarding.    EXT: There is good pulses bilaterally.  There is no calf tenderness.  There is no rash noted.  There is no edema    NEURO: Alert but disoriented but and does follow simple commands.  M.  Diffuse tenderness throughout the abdomen.  Uscle strength and sensory exam is grossly normal.  And the patient is neurologically intact with no focal findings.      ED Course     Labs Reviewed   COMP METABOLIC PANEL (14) - Abnormal; Notable for the following components:       Result Value    Glucose 156 (*)     BUN 27 (*)     Creatinine 1.92 (*)     Calculated Osmolality 298 (*)     eGFR-Cr 34 (*)     ALT 15 (*)     Albumin 3.3 (*)     Globulin  4.5 (*)     A/G Ratio 0.7 (*)     All other components within normal limits   LIPASE - Abnormal; Notable for the following components:    Lipase 12 (*)     All other components within normal limits   PROTHROMBIN TIME (PT) - Abnormal; Notable for the following components:    PT 34.0 (*)     INR 3.29 (*)     All other components within normal limits   CBC W/ DIFFERENTIAL - Abnormal; Notable for the following components:    WBC 19.0 (*)     Neutrophil Absolute Prelim 16.65 (*)     Neutrophil Absolute 16.65 (*)     Monocyte Absolute 1.01 (*)     All other components within normal limits   TROPONIN I HIGH SENSITIVITY - Normal   CBC WITH DIFFERENTIAL WITH PLATELET    Narrative:     The following orders were created for panel order CBC With Differential With Platelet.  Procedure                               Abnormality         Status                     ---------                               -----------         ------                     CBC W/ DIFFERENTIAL[[id number]]          Abnormal            Final result                 Please view results for these tests on the individual orders.   URINALYSIS WITH CULTURE REFLEX   RAINBOW DRAW LAVENDER   RAINBOW DRAW LIGHT GREEN   RAINBOW DRAW GOLD   RAINBOW DRAW BLUE                     The patient was placed on monitors, IV was started, blood was drawn.         MDM      The EKG shows normal sinus rhythm.  There is no acute ST elevations o some nonspecific ST changes and an old inferior infarct, noted..  The rest of the EKG including rate rhythm axis and intervals I agree with the EKG report . The rate is 94 there is findings of PVCs, atrial fibs flutter is noted.    Admission disposition: 12/26/2023 11:24 PM       The right bundle branch block is noted.    When compared to an old EKG the old inferior infarct and right bundle branch block is not new.          Workup was done to rule out an obstruction, perforation, dehydration or electrolyte imbalance.       Since troponin was not elevated CBC shows a white count of 19,000 labs are consistent with some dehydration his BUN was 27 creatinine 1.92.  INR was 3.29.   I personally reviewed the radiographs and my individual interpretation shows    Mild bowel obstruction noted.  With some nonspecific stranding    Also reviewed official report and it shows  CT ABDOMEN PELVIS IV CONTRAST, NO ORAL (ER)    Result Date: 12/26/2023  PROCEDURE:  CT ABDOMEN PELVIS IV CONTRAST, NO ORAL (ER)  COMPARISON:  [facility], CT ABDOMEN+PELVIS(CPT=74176), 10/29/2017, 8:04 PM.  INDICATIONS:  n/v nausea vomiting  TECHNIQUE:  CT scanning was performed from the dome of the diaphragm to the pubic symphysis with non-ionic intravenous contrast material. Post contrast coronal MPR imaging was performed.  Dose reduction techniques were used. Dose information is transmitted to the ACR (American College of Radiology) NRDR (National Radiology Data Registry) which includes the Dose Index Registry.  PATIENT STATED HISTORY:(As transcribed by Technologist)  n/v   CONTRAST USED:  80cc of Isovue 370  FINDINGS:    LIVER:  Unremarkable.  BILIARY:  Gallbladder is dilated 5.8 cm transverse dimension.  Fluid present around the gallbladder and small amount of fluid in Szymanski's pouch.  No gas  bubbles in the gallbladder lumen or wall or visible stone.  The common bile duct does not appear dilated.  There may be a tiny duodenal diverticulum..  PANCREAS:  Unremarkable.  SPLEEN:  Unremarkable.  KIDNEYS:  No acute abnormality.  Tiny probable cysts  ADRENALS:  Unremarkable.  AORTA/VASCULAR:  No aortic aneurysm. There are atherosclerotic changes including calcification involving the aorta, but no evidence for aneurysm involving the aorta.  RETROPERITONEUM:  Unremarkable.  BOWEL/MESENTERY:  Dilated air and fluid-filled loops of small bowel maximum dimension 5.3 cm in the left upper quadrant.  Small bowel dilation appears to have a transition in the medial right mid abdomen in a region of enteric staples, and thereafter the  small bowel appears decompressed to the level of the ileocecal valve.  The colon contains liquid stool and air, all the way to the rectum, the rectum is dilated with mostly liquid stool 6.9 cm transverse dimension.  A particularly narrowed segment of colon measuring about 8 cm in length is present in the right upper quadrant, appearing to involve the upper ascending colon and splenic flexure.  The colon enhances here, and is surrounded by tissue thickening and stranding and some fluid.  The adjacent gallbladder is dilated.  There is no large or drainable ascites, and no fluid in the pelvis or lower abdomen, but around the liver there is a rim of fluid measuring 2.2 cm. No free air seen.  ABDOMINAL WALL:  Unremarkable.  URINARY BLADDER:  Unremarkable.  LYMPH NODES PELVIS:  Unremarkable.  PELVIC ORGANS:  No acute process.  LUNG BASES:  Trace right pleural effusion and bilateral patchy lower lobe opacities likely atelectasis.  Some areas of scarring in the middle lobe and lingula.  BONES:  No acute abnormality. Note is made of degenerative changes present in the spine.              CONCLUSION:   1. Small-bowel obstruction, with dilated small bowel measuring up to 5.2 cm, with transition point in  the right abdomen in a region of enteric small bowel staples.  Suspect adhesions/stricture, possibly related to the surgery.  The small bowel distal to this is not dilated.  2. No large or drainable ascites, but there is free fluid in the right upper quadrant around the liver.  3. In addition, the gallbladder is dilated and there is stranding of the fat around this.  Correlate for any signs of acute cholecystitis.  4. A segment of colon in the right upper quadrant is narrowed, thickened and enhancing, as it traverses through a zone of inflammatory appearing stranding and fluid, adjacent to the above-described gallbladder.  Whether this is secondary to gallbladder inflammation secondarily affecting the colon or a primary process of the colon is indeterminate.  Suggest CT scan follow-up to see if this areas persist, and if so consider colonoscopy to exclude lesions of the colon in this area.  Otherwise, the colon contains liquid stool from the cecum to the rectum.  5. No free air.  Trace right pleural effusion and atelectasis.  Other findings as above.    LOCATION:  [facility]   Dictated by (CST): Hao Quiroz MD on 12/26/2023 at 10:54 PM     Finalized by (CST): Hao Quiroz MD on 12/26/2023 at 11:04 PM         The patient's case was discussed with Dr. Vera will discuss with The hospitalist to talk to the family.  The patient got dementia will get an NG placed.  Because of the elevated white count and nonspecific stranding we will start antibiotics.  Talk to the family they stated that he had a similar episode about 4 years ago and 10 years ago 4 years ago they watched him with just NG and he did well 10 years ago he had did have surgery but they are not 100% sure who the surgeon is.  Patient will be admitted for further evaluation treatment.        The NG is in the upper abdomen.  I did go back and reexamined his abdomen is significantly better he has no rebound no guarding.  Discussed this case with the  hospitalist patient will be admitted for further evaluation and treatment.    Medical Decision Making      Disposition and Plan     Clinical Impression:  1. Small bowel obstruction (HCC)    2. Nausea and vomiting in adult    3. Dehydration    4. Leukocytosis, unspecified type         Disposition:  Admit  12/26/2023 11:24 pm    Follow-up:  No follow-up provider specified.        Medications Prescribed:  Current Discharge Medication List                            Hospital Problems       Present on Admission  Date Reviewed: 4/7/2022            ICD-10-CM Noted POA    * (Principal) Small bowel obstruction (HCC) K56.609 12/26/2023 Unknown    Hyperglycemia R73.9 12/26/2023 Yes    Hypokalemia E87.6 12/26/2023 Yes    Leukocytosis D72.829 12/26/2023 Yes

## 2023-12-27 NOTE — PROGRESS NOTES
[facility]     Hospitalist Progress Note     Liang Archibald Patient Status:  Inpatient    3/4/1940 MRN [id number]   Location [facility] 3SW-A Attending Miguel Nicloe MD   Hosp Day # 1 PCP Victorina Morris MD     Chief Complaint: SBO    Subjective:   Patient doing well. No complaints of pain. Wants water.     Current medications:   potassium chloride  40 mEq Intravenous Once    pantoprazole  40 mg Intravenous Q12H    metoprolol  2.5 mg Intravenous Q6H       Objective:    Review of Systems:   10 point ROS completed and was negative, except for pertinent positive and negatives stated in subjective.    Vital signs:  Temp:  [98.5 °F (36.9 °C)-100.4 °F (38 °C)] 100.4 °F (38 °C)  Pulse:  [52-78] 78  Resp:  [16-20] 16  BP: (109-143)/(52-76) 122/76  SpO2:  [90 %-96 %] 95 %  Patient Weight for the past 72 hrs:   Weight   23 2326 138 lb (62.6 kg)     Physical Exam:    General: No acute distress.   Respiratory: Diminished   Cardiovascular: S1, S2. Regular   Abdomen: Soft, nontender, nondistended.  Positive bowel sounds  Extremities: No edema.    Diagnostic Data:    Labs:  Recent Labs   Lab 23  0443 23  1500 23  0454   WBC 19.0*  --  12.0* 11.2*   HGB 16.2  --  13.2 12.9*   MCV 95.3  --  96.3 97.2   .0  --  148.0* 152.0   INR 3.29* 3.98*  --  5.65*       Recent Labs   Lab 23  1500 23  0454   * 115* 130*   BUN 27* 27* 27*   CREATSERUM 1.92* 1.61* 1.39*   CA 9.4 8.9 8.9   ALB 3.3* 2.4* 2.6*    143 146*   K 3.5 3.3* 3.1*  3.1*    110 113*   CO2 25.0 25.0 29.0   ALKPHO 95 71 76   AST 22 20 18   ALT 15* 10* 13*   BILT 2.0 0.9 0.9   TP 7.8 6.3* 6.2*       CrCl cannot be calculated (Unknown ideal weight.).    Recent Labs   Lab 233 233 23  0454   PTP 34.0* 39.5* 52.2*   INR 3.29* 3.98* 5.65*            COVID-19 Lab Results    COVID-19  No results found for: \"COVID19\"    Pro-Calcitonin  No results  for input(s): \"PCT\" in the last 168 hours.    Cardiac  No results for input(s): \"TROP\", \"PBNP\" in the last 168 hours.    Creatinine Kinase  No results for input(s): \"CK\" in the last 168 hours.    Inflammatory Markers  No results for input(s): \"CRP\", \"JUSTINA\", \"LDH\", \"DDIMER\" in the last 168 hours.    Recent Labs   Lab 12/26/23 2123   TROPHS 23       Imaging: Imaging data reviewed in Epic.    Medications:    potassium chloride  40 mEq Intravenous Once    pantoprazole  40 mg Intravenous Q12H    metoprolol  2.5 mg Intravenous Q6H       Assessment & Plan:    SBO, improving   NPO  NGT clamped  IVF  Pain control  Monitor bowel function  Surgical consult   Cholecystitis? No abdominal pain, LFTs normal  Monitor abdominal exam  Hypernatremia  Adjust IVF  Monitor electrolytes   GERD  PPI IV  Acute kidney injury, improving   Monitor UOP, creatinine and electrolytes   CAD sp CABG  Essential hypertension  Dyslipidemia  Atrial fibrillation  Metoprolol IV  Monitor hemodynamics  Telemetry   Coagulopathy d/t Coumadin   Monitor INR  Alzheimer's dementia  MIKE  Leukocytosis, improving     Supplementary Documentation:   Quality:  DVT Prophylaxis: INR > 2     At this point Mr. Archibald is expected to be discharge to: TBD, PT evaluation     Plan of care discussed with patient and RN.    Miguel Nicole MD        **Certification      PHYSICIAN Certification of Need for Inpatient Hospitalization - Initial Certification    Patient will require inpatient services that will reasonably be expected to span two midnight's based on the clinical documentation in H+P.   Based on patients current state of illness, I anticipate that, after discharge, patient will require TBD.

## 2023-12-27 NOTE — H&P
[facility]     Liang Archibald Patient Status:  Emergency    3/4/1940 MRN [id number]   Location [facility] EMERGENCY DEPARTMENT Attending No att. providers found   Hosp Day # 0 PCP Victorina Morris MD     Chief Complaint:   Chief Complaint   Patient presents with    Nausea/Vomiting/Diarrhea       Subjective:    History of Present Illness:     Liang Archibald is a 83 year old male with a past medical history of dementia, anxiety, CAD, GERD.  Patient lives in a nursing home.  He comes to the emergency room now secondary to episodes of vomiting.  Imaging showed signs of bowel obstruction.    History/Other:    Past Medical History:  Past Medical History:   Diagnosis Date    Anxiety 2013    Arrhythmia     afib after cabg, resolved now    ASHD (arteriosclerotic heart disease) 10/8/2012    Atherosclerosis of coronary artery     [place] 2013    Coronary atherosclerosis of unspecified type of vessel, native or graft     S/P MI, angioplasty LAD    Esophageal reflux     Essential hypertension     Hearing impairment     Heart attack (HCC)     Hernia     will be having surgery in the future    High blood pressure     High cholesterol     Hyperlipidemia     Insomnia 2013    Myocardial infarction (HCC)         Obstructive sleep apnea (adult) (pediatric) SPLIT NIGHT --    AHI 6 SaO2 gary 92 % CPAP 8     Unspecified sleep apnea     Visual impairment      Past Surgical History:   Past Surgical History:   Procedure Laterality Date    ADENOIDECTOMY      palate surgery,deviated septum    ANGIOPLASTY (CORONARY)      one vessel    APPENDECTOMY      CABG  2014    4 bypass    CATH PERCUTANEOUS  TRANSLUMINAL CORONARY ANGIOPLASTY      COLECTOMY  2014    for bowel obstruction    COLON SURGERY      COLONOSCOPY,DIAGNOSTIC  03    normal    COLONOSCOPY,DIAGNOSTIC  3/16/13    descending polyp  at 40 cm    DRAIN/INJECT LARGE JOINT/BURSA  2014    Procedure:  BURSA INJECTION;  Surgeon: Alejandro Mcmillan MD;  Location: [facility]    FLUOROSCOPIC GUIDANCE NEEDLE PLACEMENT  1/8/2014    Procedure: LUMBAR FACET INJECTION OR MEDIAL BRANCH NERVE BLOCK;  Surgeon: Alejandro Mcmillan MD;  Location: [facility]    HERNIA SURGERY      INGUINAL HERNIA REPAIR Left 4/13/2015    Procedure: HERNIA INGUINAL REPAIR ADULT;  Surgeon: Raheem Goff MD;  Location: [facility] MAIN OR    INJ PARAVERT F JNT L/S 1 LEV Right 8/5/2015    Procedure: LUMBAR FACET INJECTION OR MEDIAL BRANCH NERVE BLOCK;  Surgeon: Diony Zuniga MD;  Location: [facility]    INJ PARAVERT F JNT L/S 1 LEV Right 1/14/2016    Procedure: LUMBAR FACET INJECTION OR MEDIAL BRANCH NERVE BLOCK;  Surgeon: Diony Zuniga MD;  Location: [facility]    INJ PARAVERT F JNT L/S 1 LEV Right 2/1/2016    Procedure: LUMBAR FACET INJECTION OR MEDIAL BRANCH NERVE BLOCK;  Surgeon: Diony Zuniga MD;  Location: [facility]    INJ PARAVERT F JNT L/S 2 LEV Right 8/5/2015    Procedure: LUMBAR FACET INJECTION OR MEDIAL BRANCH NERVE BLOCK;  Surgeon: Diony Zuniga MD;  Location: [facility]    INJ PARAVERT F JNT L/S 2 LEV Right 1/14/2016    Procedure: LUMBAR FACET INJECTION OR MEDIAL BRANCH NERVE BLOCK;  Surgeon: Diony Zuniga MD;  Location: [facility]    INJ PARAVERT F JNT L/S 2 LEV Right 2/1/2016    Procedure: LUMBAR FACET INJECTION OR MEDIAL BRANCH NERVE BLOCK;  Surgeon: Diony Zuniga MD;  Location: [facility]    INJ PARAVERT F JNT L/S 3 LEV Right 8/5/2015    Procedure: LUMBAR FACET INJECTION OR MEDIAL BRANCH NERVE BLOCK;  Surgeon: Diony Zuniga MD;  Location: [facility]    INJECTION, ANESTHETIC/STEROID, TRANSFORAMINAL EPIDURAL; LUMBAR/SACRAL, ADD'L LEVEL N/A 8/20/2015    Procedure: TRANSFORAMINAL EPIDURAL - LUMBAR;  Surgeon: Alejandro Mcmillan MD;  Location: [facility]  FOR PAIN MANAGEMENT    INJECTION, ANESTHETIC/STEROID, TRANSFORAMINAL EPIDURAL; LUMBAR/SACRAL, ADD'L LEVEL Right 9/4/2015    Procedure: TRANSFORAMINAL EPIDURAL - LUMBAR;  Surgeon: Alejandro Mcmillan MD;  Location: [facility]    INJECTION, ANESTHETIC/STEROID, TRANSFORAMINAL EPIDURAL; LUMBAR/SACRAL, ADD'L LEVEL Right 3/2/2016    Procedure: TRANSFORAMINAL EPIDURAL - LUMBAR;  Surgeon: Diony Zuniga MD;  Location: [facility]    INJECTION, ANESTHETIC/STEROID, TRANSFORAMINAL EPIDURAL; LUMBAR/SACRAL, ADD'L LEVEL Right 3/10/2016    Procedure: TRANSFORAMINAL EPIDURAL - LUMBAR;  Surgeon: Diony Zuniga MD;  Location: [facility]    INJECTION, ANESTHETIC/STEROID, TRANSFORAMINAL EPIDURAL; LUMBAR/SACRAL, ADD'L LEVEL Right 3/29/2016    Procedure: TRANSFORAMINAL EPIDURAL - LUMBAR;  Surgeon: Diony Zuniga MD;  Location: [facility]    INJECTION, ANESTHETIC/STEROID, TRANSFORAMINAL EPIDURAL; LUMBAR/SACRAL, SINGLE LEVEL N/A 8/20/2015    Procedure: TRANSFORAMINAL EPIDURAL - LUMBAR;  Surgeon: Alejandro Mcmillan MD;  Location: [facility]    INJECTION, ANESTHETIC/STEROID, TRANSFORAMINAL EPIDURAL; LUMBAR/SACRAL, SINGLE LEVEL Right 9/4/2015    Procedure: TRANSFORAMINAL EPIDURAL - LUMBAR;  Surgeon: Alejandro Mcmillan MD;  Location: [facility]    INJECTION, ANESTHETIC/STEROID, TRANSFORAMINAL EPIDURAL; LUMBAR/SACRAL, SINGLE LEVEL Right 3/2/2016    Procedure: TRANSFORAMINAL EPIDURAL - LUMBAR;  Surgeon: Diony Zuniga MD;  Location: [facility]    INJECTION, ANESTHETIC/STEROID, TRANSFORAMINAL EPIDURAL; LUMBAR/SACRAL, SINGLE LEVEL Right 3/10/2016    Procedure: TRANSFORAMINAL EPIDURAL - LUMBAR;  Surgeon: Diony Zuniga MD;  Location: [facility]    INJECTION, ANESTHETIC/STEROID, TRANSFORAMINAL EPIDURAL; LUMBAR/SACRAL, SINGLE LEVEL Right 3/29/2016    Procedure: TRANSFORAMINAL EPIDURAL -  LUMBAR;  Surgeon: Diony Zuniga MD;  Location: [facility]IN [facility]    INJECTION, W/WO CONTRAST, DX/THERAPEUTIC SUBSTANCE, EPIDURAL/SUBARACHNOID; LUMBAR/SACRAL N/A 6/17/2015    Procedure: LUMBAR EPIDURAL;  Surgeon: Alejandro Mcmillan MD;  Location: [facility]    INJECTION, W/WO CONTRAST, DX/THERAPEUTIC SUBSTANCE, EPIDURAL/SUBARACHNOID; LUMBAR/SACRAL N/A 9/18/2015    Procedure: LUMBAR EPIDURAL;  Surgeon: Diony Zuniga MD;  Location: [facility]    INJECTION, W/WO CONTRAST, DX/THERAPEUTIC SUBSTANCE, EPIDURAL/SUBARACHNOID; LUMBAR/SACRAL N/A 10/2/2015    Procedure: LUMBAR EPIDURAL;  Surgeon: Diony Zuniga MD;  Location: [facility]    OPEN HEART SURGICAL PROFILE      OTHER SURGICAL HISTORY      Tonsillectomy uvelectomy     OTHER SURGICAL HISTORY  10/2015    surgery for bowel obstruction    PATIENT DOCUMENTED NOT TO HAVE EXPERIENCED ANY OF THE FOLLOWING EVENTS  1/8/2014    Procedure: LUMBAR FACET INJECTION OR MEDIAL BRANCH NERVE BLOCK;  Surgeon: Alejandro Mcmillan MD;  Location: [facility]    PATIENT DOCUMENTED NOT TO HAVE EXPERIENCED ANY OF THE FOLLOWING EVENTS N/A 6/17/2015    Procedure: LUMBAR EPIDURAL;  Surgeon: Alejandro Mcmillan MD;  Location: [facility]    PATIENT DOCUMENTED NOT TO HAVE EXPERIENCED ANY OF THE FOLLOWING EVENTS Right 8/5/2015    Procedure: LUMBAR FACET INJECTION OR MEDIAL BRANCH NERVE BLOCK;  Surgeon: Diony Zuniga MD;  Location: [facility]    PATIENT DOCUMENTED NOT TO HAVE EXPERIENCED ANY OF THE FOLLOWING EVENTS N/A 8/20/2015    Procedure: TRANSFORAMINAL EPIDURAL - LUMBAR;  Surgeon: Alejandro Mcmillan MD;  Location: [facility]    PATIENT DOCUMENTED NOT TO HAVE EXPERIENCED ANY OF THE FOLLOWING EVENTS Right 9/4/2015    Procedure: TRANSFORAMINAL EPIDURAL - LUMBAR;  Surgeon: Alejandro Mcmillan MD;  Location: [facility]    PATIENT  DOCUMENTED NOT TO HAVE EXPERIENCED ANY OF THE FOLLOWING EVENTS N/A 9/18/2015    Procedure: LUMBAR EPIDURAL;  Surgeon: Diony Zuniga MD;  Location: [facility] FOR PAIN MANAGEMENT    PATIENT DOCUMENTED NOT TO HAVE EXPERIENCED ANY OF THE FOLLOWING EVENTS N/A 10/2/2015    Procedure: LUMBAR EPIDURAL;  Surgeon: Diony Zuniga MD;  Location: [facility]IN [facility]    PATIENT DOCUMENTED NOT TO HAVE EXPERIENCED ANY OF THE FOLLOWING EVENTS Right 1/14/2016    Procedure: LUMBAR FACET INJECTION OR MEDIAL BRANCH NERVE BLOCK;  Surgeon: Diony Zuniga MD;  Location: [facility]    PATIENT DOCUMENTED NOT TO HAVE EXPERIENCED ANY OF THE FOLLOWING EVENTS Right 2/1/2016    Procedure: LUMBAR FACET INJECTION OR MEDIAL BRANCH NERVE BLOCK;  Surgeon: Diony Zuniga MD;  Location: [facility]    PATIENT DOCUMENTED NOT TO HAVE EXPERIENCED ANY OF THE FOLLOWING EVENTS Right 3/2/2016    Procedure: TRANSFORAMINAL EPIDURAL - LUMBAR;  Surgeon: Diony Zuniga MD;  Location: [facility]    PATIENT DOCUMENTED NOT TO HAVE EXPERIENCED ANY OF THE FOLLOWING EVENTS Right 3/10/2016    Procedure: TRANSFORAMINAL EPIDURAL - LUMBAR;  Surgeon: Diony Zuniga MD;  Location: [facility]    PATIENT DOCUMENTED NOT TO HAVE EXPERIENCED ANY OF THE FOLLOWING EVENTS Right 3/29/2016    Procedure: TRANSFORAMINAL EPIDURAL - LUMBAR;  Surgeon: Diony Zuniga MD;  Location: [facility]    PATIENT WITHOUGH PREOPERATIVE ORDER FOR IV ANTIBIOTIC SURGICAL SITE INFECTION PROPHYLAXIS.  1/8/2014    Procedure: LUMBAR FACET INJECTION OR MEDIAL BRANCH NERVE BLOCK;  Surgeon: Alejandro Mcmillan MD;  Location: [facility]    PATIENT WITHOUGH PREOPERATIVE ORDER FOR IV ANTIBIOTIC SURGICAL SITE INFECTION PROPHYLAXIS. N/A 6/17/2015    Procedure: LUMBAR EPIDURAL;  Surgeon: Alejandro Mcmillan MD;  Location: [facility]    PATIENT WITHOUGH PREOPERATIVE  ORDER FOR IV ANTIBIOTIC SURGICAL SITE INFECTION PROPHYLAXIS. Right 8/5/2015    Procedure: LUMBAR FACET INJECTION OR MEDIAL BRANCH NERVE BLOCK;  Surgeon: Diony Zuniga MD;  Location[facility]    PATIENT WITHOUGH PREOPERATIVE ORDER FOR IV ANTIBIOTIC SURGICAL SITE INFECTION PROPHYLAXIS. N/A 8/20/2015    Procedure: TRANSFORAMINAL EPIDURAL - LUMBAR;  Surgeon: Alejandro Mcmillan MD;  [facility]    PATIENT WITHOUGH PREOPERATIVE ORDER FOR IV ANTIBIOTIC SURGICAL SITE INFECTION PROPHYLAXIS. Right 9/4/2015    Procedure: TRANSFORAMINAL EPIDURAL - LUMBAR;  Surgeon: Alejandro Mcmillan MD;  [facility]    PATIENT WITHOUGH PREOPERATIVE ORDER FOR IV ANTIBIOTIC SURGICAL SITE INFECTION PROPHYLAXIS. N/A 9/18/2015    Procedure: LUMBAR EPIDURAL;  Surgeon: Diony Zuniga MD;  [facility]    PATIENT WITHOUGH PREOPERATIVE ORDER FOR IV ANTIBIOTIC SURGICAL SITE INFECTION PROPHYLAXIS. N/A 10/2/2015    Procedure: LUMBAR EPIDURAL;  Surgeon: Dioyn Zuniga MD;  [facility]: [facility]    PATIENT WITHOUGH PREOPERATIVE ORDER FOR IV ANTIBIOTIC SURGICAL SITE INFECTION PROPHYLAXIS. Right 1/14/2016    Procedure: LUMBAR FACET INJECTION OR MEDIAL BRANCH NERVE BLOCK;  Surgeon: Diony Zuniga MD;  [facility]: [facility]    PATIENT WITHOUGH PREOPERATIVE ORDER FOR IV ANTIBIOTIC SURGICAL SITE INFECTION PROPHYLAXIS. Right 2/1/2016    Procedure: LUMBAR FACET INJECTION OR MEDIAL BRANCH NERVE BLOCK;  Surgeon: Diony Zuniga MD;  [facility]    PATIENT WITHOUGH PREOPERATIVE ORDER FOR IV ANTIBIOTIC SURGICAL SITE INFECTION PROPHYLAXIS. Right 3/2/2016    Procedure: TRANSFORAMINAL EPIDURAL - LUMBAR;  Surgeon: Diony Zuniga MD;  [facility]: [facility]    PATIENT WITHOUGH PREOPERATIVE ORDER FOR IV ANTIBIOTIC SURGICAL SITE INFECTION PROPHYLAXIS. Right 3/10/2016    Procedure:  TRANSFORAMINAL EPIDURAL - LUMBAR;  Surgeon: Diony Zuniga MD;  Location: [facility]    PATIENT WITHOUGH PREOPERATIVE ORDER FOR IV ANTIBIOTIC SURGICAL SITE INFECTION PROPHYLAXIS. Right 3/29/2016    Procedure: TRANSFORAMINAL EPIDURAL - LUMBAR;  Surgeon: Diony Zuniga MD;  Location: [facility]    SINUS SURGERY        deviated septum    SPECIAL SERVICE OR REPORT  2002    S/P uvulopalatoplasty, sinus surgery, septoplasty      Family History:   Family History   Problem Relation Age of Onset    Heart Disorder Sister      Social History:    reports that he has never smoked. He has never used smokeless tobacco. He reports current alcohol use. He reports that he does not use drugs.     Allergies: No Known Allergies    Medications:    No current facility-administered medications on file prior to encounter.     Current Outpatient Medications on File Prior to Encounter   Medication Sig Dispense Refill    Donepezil HCl 10 MG Oral Tab Take 1 tablet (10 mg total) by mouth nightly. at bedtime 90 tablet 3    Memantine HCl 10 MG Oral Tab Take 1 tablet (10 mg total) by mouth 2 (two) times daily. 180 tablet 3    escitalopram 10 MG Oral Tab Take 1 tablet (10 mg total) by mouth daily. 90 tablet 3    MAGNESIUM OXIDE 400 (240 Mg) MG Oral Tab TAKE ONE TABLET BY MOUTH EVERY MORNING 31 tablet PRN    METOPROLOL SUCCINATE ER 25 MG Oral Tablet 24 Hr TAKE ONE TABLET BY MOUTH EVERY MORNING 31 tablet PRN    THERA/BETA-CAROTENE Oral Tab TAKE ONE TABLET BY MOUTH EVERY MORNING 31 tablet PRN    ASPIRIN ADULT LOW STRENGTH 81 MG Oral Tab EC TAKE ONE TABLET BY MOUTH EVERY MORNING 31 tablet PRN    Pantoprazole Sodium 40 MG Oral Tab EC TAKE 1 TABLET (40 MG TOTAL) BY MOUTH EVERY MORNING BEFORE BREAKFAST. 90 tablet 1    spironolactone 25 MG Oral Tab Take 0.5 tablets (12.5 mg total) by mouth daily. 30 tablet 5    furosemide 20 MG Oral Tab Take 1 tablet (20 mg total) by mouth 2 (two) times daily. 60 tablet 5     Warfarin Sodium 5 MG Oral Tab 5.5 mg po q pm  6    Cholecalciferol (VITAMIN D) 1000 UNITS Oral Cap Take 1 capsule by mouth daily.         Review of Systems:   A comprehensive review of systems was completed.    Pertinent positives and negatives noted in the HPI.    Objective:   Physical Exam:    /77   Pulse 76   Temp 97.8 °F (36.6 °C) (Temporal)   Resp 19   Wt 138 lb (62.6 kg)   SpO2 97%   BMI 18.72 kg/m²   General: NAD, confused  Respiratory: No rhonchi, no wheezes  Cardiovascular: S1, S2.   Abdomen: Soft, Non-tender, Non-distended, Positive bowel sounds  Neuro: no new focal deficits noted, unable to fully eval  Extremities: No edema      Results:    Labs:      Labs Last 24 Hours:  Recent Labs   Lab 12/26/23 2123   WBC 19.0*   HGB 16.2   MCV 95.3   .0   INR 3.29*       Recent Labs   Lab 12/26/23 2123   *   BUN 27*   CREATSERUM 1.92*   CA 9.4   ALB 3.3*      K 3.5      CO2 25.0   ALKPHO 95   AST 22   ALT 15*   BILT 2.0   TP 7.8       CrCl cannot be calculated (Unknown ideal weight.).    Recent Labs   Lab 12/26/23 2123   TROPHS 23       Recent Labs   Lab 12/26/23 2123   PTP 34.0*   INR 3.29*       No results for input(s): \"TROP\", \"CK\" in the last 168 hours.      Imaging: Imaging data reviewed in Epic.    Assessment & Plan:      #SBO  NPO  IVF  NGT to LIS    #Leukocytosis  CT with segment of thickened colon in RUQ with some enhancement and inflammation  Possible GB inflammation vs primary colon inflammation  Given zosyn in ED  LFT's not sig elevated  Check lactic acid  Hold further ABX for now    #SADAF  IVF    #Dementia    #A. Fib  IV BB  Hold coumadin    #Coumadin coagulopathy  Hold coumadin  Monitor INR    #Advance care planning  Voluntary meeting  17 minutes spent face-to-face with the patient and daughter.  ACP discussed, explained and reviewed with them in detail.  The patient is a DNAR/S.  CODE STATUS updated in system.          Plan of care discussed with ED  physician    Robert Estrada MD    Supplementary Documentation:     The 21st Century Cures Act makes medical notes like these available to patients in the interest of transparency. Please be advised this is a medical document. Medical documents are intended to carry relevant information, facts as evident, and the clinical opinion of the practitioner. The medical note is intended as peer to peer communication and may appear blunt or direct. It is written in medical language and may contain abbreviations or verbiage that are unfamiliar.

## 2023-12-27 NOTE — PLAN OF CARE
Pt a&o to self at baseline-per daughter patient \"sleeps all day\" . IVF running per mar, NG to LIS @75cm. No complaints of n/v, sob, pain or abdominal tenderness, hypoactive bowel sounds, NPO, Protonix IV.  This RN unable to complete admission due to no paper work from nursing home and unable to get in contact with nursing home via telephone.

## 2023-12-27 NOTE — ED QUICK NOTES
Orders for admission, patient is aware of plan and ready to go upstairs. Any questions, please call ED RN Brandee at extension [phone].     Patient Covid vaccination status: Partially vaccinated     COVID Test Ordered in ED: None    COVID Suspicion at Admission: N/A    Running Infusions:        Mental Status/LOC at time of transport: alert, hx of dementia     Other pertinent information:   CIWA score: N/A   NIH score:  N/A

## 2023-12-28 PROBLEM — Z90.49 H/O RESECTION OF SMALL BOWEL: Status: ACTIVE | Noted: 2023-12-28

## 2023-12-28 LAB
ALBUMIN SERPL-MCNC: 2.6 G/DL (ref 3.4–5)
ALBUMIN/GLOB SERPL: 0.7 {RATIO} (ref 1–2)
ALP LIVER SERPL-CCNC: 76 U/L
ALT SERPL-CCNC: 13 U/L
ANION GAP SERPL CALC-SCNC: 4 MMOL/L (ref 0–18)
AST SERPL-CCNC: 18 U/L (ref 15–37)
ATRIAL RATE: 227 BPM
ATRIAL RATE: 80 BPM
BASOPHILS # BLD AUTO: 0.02 X10(3) UL (ref 0–0.2)
BASOPHILS NFR BLD AUTO: 0.2 %
BILIRUB SERPL-MCNC: 0.9 MG/DL (ref 0.1–2)
BUN BLD-MCNC: 27 MG/DL (ref 9–23)
CALCIUM BLD-MCNC: 8.9 MG/DL (ref 8.5–10.1)
CHLORIDE SERPL-SCNC: 113 MMOL/L (ref 98–112)
CO2 SERPL-SCNC: 29 MMOL/L (ref 21–32)
CREAT BLD-MCNC: 1.39 MG/DL
EGFRCR SERPLBLD CKD-EPI 2021: 50 ML/MIN/1.73M2 (ref 60–?)
EOSINOPHIL # BLD AUTO: 0.02 X10(3) UL (ref 0–0.7)
EOSINOPHIL NFR BLD AUTO: 0.2 %
ERYTHROCYTE [DISTWIDTH] IN BLOOD BY AUTOMATED COUNT: 13.5 %
GLOBULIN PLAS-MCNC: 3.6 G/DL (ref 2.8–4.4)
GLUCOSE BLD-MCNC: 130 MG/DL (ref 70–99)
HCT VFR BLD AUTO: 38.5 %
HGB BLD-MCNC: 12.9 G/DL
IMM GRANULOCYTES # BLD AUTO: 0.06 X10(3) UL (ref 0–1)
IMM GRANULOCYTES NFR BLD: 0.5 %
INR BLD: 5.65 (ref 0.8–1.2)
LYMPHOCYTES # BLD AUTO: 0.59 X10(3) UL (ref 1–4)
LYMPHOCYTES NFR BLD AUTO: 5.3 %
MAGNESIUM SERPL-MCNC: 2 MG/DL (ref 1.6–2.6)
MCH RBC QN AUTO: 32.6 PG (ref 26–34)
MCHC RBC AUTO-ENTMCNC: 33.5 G/DL (ref 31–37)
MCV RBC AUTO: 97.2 FL
MONOCYTES # BLD AUTO: 0.48 X10(3) UL (ref 0.1–1)
MONOCYTES NFR BLD AUTO: 4.3 %
NEUTROPHILS # BLD AUTO: 9.99 X10 (3) UL (ref 1.5–7.7)
NEUTROPHILS # BLD AUTO: 9.99 X10(3) UL (ref 1.5–7.7)
NEUTROPHILS NFR BLD AUTO: 89.5 %
OSMOLALITY SERPL CALC.SUM OF ELEC: 309 MOSM/KG (ref 275–295)
P AXIS: 265 DEGREES
P-R INTERVAL: 170 MS
PLATELET # BLD AUTO: 152 10(3)UL (ref 150–450)
POTASSIUM SERPL-SCNC: 3.1 MMOL/L (ref 3.5–5.1)
POTASSIUM SERPL-SCNC: 3.1 MMOL/L (ref 3.5–5.1)
PROT SERPL-MCNC: 6.2 G/DL (ref 6.4–8.2)
PROTHROMBIN TIME: 52.2 SECONDS (ref 11.6–14.8)
Q-T INTERVAL: 436 MS
Q-T INTERVAL: 448 MS
QRS DURATION: 154 MS
QRS DURATION: 170 MS
QTC CALCULATION (BEZET): 424 MS
QTC CALCULATION (BEZET): 545 MS
R AXIS: -63 DEGREES
R AXIS: -66 DEGREES
RBC # BLD AUTO: 3.96 X10(6)UL
SODIUM SERPL-SCNC: 146 MMOL/L (ref 136–145)
T AXIS: -20 DEGREES
T AXIS: -64 DEGREES
VENTRICULAR RATE: 54 BPM
VENTRICULAR RATE: 94 BPM
WBC # BLD AUTO: 11.2 X10(3) UL (ref 4–11)

## 2023-12-28 PROCEDURE — 99233 SBSQ HOSP IP/OBS HIGH 50: CPT | Performed by: HOSPITALIST

## 2023-12-28 PROCEDURE — 99232 SBSQ HOSP IP/OBS MODERATE 35: CPT | Performed by: SURGERY

## 2023-12-28 RX ORDER — DEXTROSE AND SODIUM CHLORIDE 5; .45 G/100ML; G/100ML
INJECTION, SOLUTION INTRAVENOUS CONTINUOUS
Status: DISCONTINUED | OUTPATIENT
Start: 2023-12-28 | End: 2023-12-29

## 2023-12-28 NOTE — PROGRESS NOTES
[facility]  Progress Note    Liang Archibald Patient Status:  Inpatient    3/4/1940 MRN [id number]   Location [facility] [place] 3SW-A Attending Miguel Nicole MD   Hosp Day # 1 PCP Victorina Morris MD     Subjective:  The patient is resting comfortably in bed.  NG tube is in place.  Nursing staff reports large, watery  bowel movement overnight.    Objective/Physical Exam:  General:  Cooperative.  No apparent distress.  Vital Signs:  Blood pressure 143/62, pulse 66, temperature 98.6 °F (37 °C), temperature source Oral, resp. rate 16, weight 138 lb (62.6 kg), SpO2 90%.  Lungs: No respiratory distress.  Cardiac: Regular rate and rhythm.   Abdomen:  Soft, non distended, non tender, with no rebound or guarding.  No peritoneal signs.   Extremities:  No lower extremity edema noted.      Labs:  Lab Results   Component Value Date    WBC 11.2 2023    HGB 12.9 2023    HCT 38.5 2023    .0 2023     Lab Results   Component Value Date     2023    K 3.1 2023    K 3.1 2023     2023    CO2 29.0 2023    BUN 27 2023    CREATSERUM 1.39 2023     2023    CA 8.9 2023    ALKPHO 76 2023    ALT 13 2023    AST 18 2023    BILT 0.9 2023    ALB 2.6 2023    TP 6.2 2023     Lab Results   Component Value Date    PT 24.5 (H) 2014    PT 22.4 (H) 2014    PT 23.3 (H) 2014    INR 5.65 (HH) 2023    INR 3.98 (H) 2023    INR 3.29 (H) 2023       I/O last 3 completed shifts:  In: 0   Out: 800 [Urine:700; Emesis/NG output:100]  No intake/output data recorded.    Assessment  Patient Active Problem List   Diagnosis    ASHD (arteriosclerotic heart disease)    HNP (herniated nucleus pulposus), lumbar    Lumbar spondylosis    Myofascial pain syndrome    Atrial fibrillation and flutter (HCC)    Spondylolisthesis of lumbar region    MIKE on CPAP    Cerebral microvascular disease     Cognitive complaints    Acute on chronic congestive heart failure (HCC)    Acute on chronic congestive heart failure, unspecified heart failure type (HCC)    Fall, initial encounter    Hypomagnesemia    Alzheimer's disease of other onset without behavioral disturbance    Weight loss, non-intentional    Hypokalemia    Leukocytosis    Hyperglycemia    Small bowel obstruction (HCC)    ACP (advance care planning)    Nausea and vomiting in adult    Dehydration    Leukocytosis, unspecified type       Small bowel obstruction    Plan:  Clamp NG tube for 3 hours and check residuals, if residuals are less than 200, may remove NG tube and start clear liquid diet  NPO.  He may have ice chips  Continued medical management as per attending.    Quin Shell PA-C  12/28/2023  10:56 AM    Patient slightly more alert this morning.  He denies abdominal pain.  Abdomen is soft, no vocalization or grimacing with palpation.  NG tube output low and patient did have evidence of bowel function.  NG tube clamp trial in progress.  No indication for emergent surgical intervention at this time  I agree with the note above and attest to its accuracy with the following changes below after my interview and examination of the patient    The patient was seen and examined.  Available labs and radiology is noted.    Sary Vera MD FACS    Please note that this report has been produced using speech recognition software and may contain errors related to that system including but not limited to errors in grammar, punctuation and spelling as well as words and phrases that possibly may have been recognized inappropriately.  If there are any questions or concerns please contact the dictating provider for clarification.    The 21st Century Cures Act makes medical notes like these available to patients in the interest of trans parency. Please be advised this is a medical document. Medical documents are intended to carry relevant information, facts as  evident, and the clinical opinion of the practitioner. The medical note is intended as peer to peer communication and may appear blunt or direct. It is written in medical language and may contain abbreviations or verbiage that are unfamiliar.   Again if there are any questions or concerns please contact the dictating provider for clarification.

## 2023-12-28 NOTE — PHYSICAL THERAPY NOTE
PHYSICAL THERAPY EVALUATION - INPATIENT     Room Number: 376/376-A  Evaluation Date: 12/28/2023  Type of Evaluation: Initial  Physician Order: PT Eval and Treat    Presenting Problem: SBO  Co-Morbidities : afib, Alzheimers dementia, CHF, CAD, HTN, MI, CABG  Reason for Therapy: Mobility Dysfunction and Discharge Planning      ASSESSMENT   Pt is a 83 year old male admitted on 12/26/2023 for SBO. Functional outcome measures completed include AMPA.  The AM-PAC '6-Clicks' Inpatient Basic Mobility Short Form was completed and this patient is demonstrating a Approx Degree of Impairment: 46.58%  degree of impairment in mobility. Research supports that patients with this level of impairment may benefit from home with increased supervision initially which dtr reports facility can provide.  PT Discharge Recommendations: Home (increased supervision)      PLAN  Patient has been evaluated and presents with no skilled Physical Therapy needs at this time.  Patient discharged from Physical Therapy services.  Please re-order if a new functional limitation presents during this admission.    GOALS  Patient was able to achieve the following goals ...    Patient was able to transfer Safely with CGA    Patient able to ambulate on level surfaces Safely with CGA         HOME SITUATION  Type of Home:  (memory care facility)   Home Layout: One level                Lives With: Staff 24 hours  Drives: No  Patient Owned Equipment: Rolling walker;Wheelchair       Prior Level of [place]: OT spoke with pt's dtr as pt is poor historian - pt typically ambulates a very short distance to and from bathroom with RW and to the dining liz (3 rooms down), otherwise stays in his recliner. Pt sleeps in the recliner. Pt goes on outings with dtr for a few hours at a time.     SUBJECTIVE  \"I'd like to go to sleep\"       OBJECTIVE  Precautions: Bed/chair alarm;NG suction  Fall Risk: High fall risk    WEIGHT BEARING RESTRICTION  Weight Bearing  Restriction: None                PAIN ASSESSMENT  Ratin          COGNITION  Overall Cognitive Status:  Impaired and pt with Alzhemier's dementia    RANGE OF MOTION AND STRENGTH ASSESSMENT  Upper extremity ROM and strength are within functional limits     Lower extremity ROM is within functional limits     Lower extremity strength is within functional limits       BALANCE  Static Sitting: Good  Dynamic Sitting: Fair +  Static Standing: Poor +  Dynamic Standing: Poor +    ADDITIONAL TESTS                                    ACTIVITY TOLERANCE                         O2 WALK       NEUROLOGICAL FINDINGS                        AM-PAC '6-Clicks' INPATIENT SHORT FORM - BASIC MOBILITY  How much difficulty does the patient currently have...  Patient Difficulty: Turning over in bed (including adjusting bedclothes, sheets and blankets)?: A Little   Patient Difficulty: Sitting down on and standing up from a chair with arms (e.g., wheelchair, bedside commode, etc.): A Little   Patient Difficulty: Moving from lying on back to sitting on the side of the bed?: A Little   How much help from another person does the patient currently need...   Help from Another: Moving to and from a bed to a chair (including a wheelchair)?: A Little   Help from Another: Need to walk in hospital room?: A Little   Help from Another: Climbing 3-5 steps with a railing?: A Little       AM-PAC Score:  Raw Score: 18   Approx Degree of Impairment: 46.58%   Standardized Score (AM-PAC Scale): 43.63   CMS Modifier (G-Code): CK    FUNCTIONAL ABILITY STATUS  Gait Assessment   Functional Mobility/Gait Assessment  Gait Assistance: Contact guard assist  Distance (ft): 10  Assistive Device:  (HHA of 1 person)  Pattern: Shuffle    Skilled Therapy Provided: Per RN julio to work with pt. Pt received in supine and was agreeable to PT session.     Bed Mobility: Pt sleeps in a recliner at home  Rolling: NT  Supine to sit: min A    Sit to supine: supervision     Transfer  Mobility:  Sit to stand: CGA   Stand to sit: CGA  Gait = pt ambulated with HHA of 1 person a short distance in room     Therapist's comments:Pt educated on role of therapy, goals for session, safety, fall prevention, and discharge planning. Per pt's dtr, pt can initially have increased supervision upon DC back to memory care.     Exercise/Education Provided:  Bed mobility  Energy conservation  Functional activity tolerated  Gait training  Posture  Strengthening  Transfer training    Patient End of Session: In bed;Needs met;Call light within reach;RN aware of session/findings;All patient questions and concerns addressed;Alarm set    Patient Evaluation Complexity Level:  History Moderate - 1 or 2 personal factors and/or co-morbidities   Examination of body systems Low - addressing 1-2 elements   Clinical Presentation Low - Stable   Clinical Decision Making Low Complexity       PT Session Time: 15 minutes

## 2023-12-28 NOTE — PLAN OF CARE
Patient is alert and oriented x2-3 drowsy but arousable and able to follow commands. Vitals stable on room air. Tele (a fib). Denies pain. NPO. NG to LIS with minimal output. Diminished urine production,  bladder scans <400mL. Last BM 12/27/23. Gen surg consult to see and IVF. Plan of care discussed with patient son and daughter.

## 2023-12-28 NOTE — PROGRESS NOTES
Assumed patient care at 2330  Patient oriented x 2, initially drowsy   Became more alert overnight and impulsive   Refuses scd's  Up to BR, voided and had a large bout of loose bm   Denies having any pain, room air, VSS  IV fluids ongoing   Safety precautions in place   Call light is in reach   Continue poc   0610  Pt with PT 52.2, INR 5.65, reported to Dr Chambers, no new orders per MD, monitor pt for bleeding.

## 2023-12-28 NOTE — OCCUPATIONAL THERAPY NOTE
OCCUPATIONAL THERAPY EVALUATION - INPATIENT    Room Number: 376/376-A  Evaluation Date: 12/28/2023     Type of Evaluation: Initial  Presenting Problem: nausea, vomiting, diarrhea; diagnosed with SBO    Physician Order: IP Consult to Occupational Therapy  Reason for Therapy:  ADL/IADL Dysfunction and Discharge Planning    History: Patient is a 83 year old male admitted on 12/26/2023 with Presenting Problem: nausea, vomiting, diarrhea; diagnosed with SBO.  Co-Morbidities : afib, Alzheimers dementia, CHF, CAD, HTN, MI, CABG    ASSESSMENT   Patient met all OT goals at near baseline level of function; will benefit from initial increased supervision and check-ins, which daughter reports facility can provide.     The AM-PAC ' '6-Clicks' Inpatient Daily Activity Short Form was completed and this patient is demonstrating an Approx Degree of Impairment: 66.57% in activities of daily living. Research supports that patients with this level of impairment often benefit from discharge home with increased supervision.    Patient noted with INR 5.65, cleared for therapy by hospitalist who stated via [employer] \"...he can be seen today, INR will remain elevated until he starts PO.\" RN also in agreement.     OT Discharge Recommendations: Long term care (return to [facility])  OT Device Recommendations: None    WEIGHT BEARING RESTRICTION  Weight Bearing Restriction: None                Recommendations for nursing staff:   Transfers: 1-person  Toileting location: Toilet or commode    EVALUATION SESSION:  Patient at start of session: supine  FUNCTIONAL TRANSFER ASSESSMENT  Sit to Stand: Edge of Bed  Edge of Bed: Contact Guard Assist    BED MOBILITY  Supine to Sit : Minimal Assist (sleeps in recliner baseline)  Sit to Supine (OT): Supervision    BALANCE ASSESSMENT     FUNCTIONAL ADL ASSESSMENT  LB Dressing Seated: Dependent (total to don B socks, baseline)      ACTIVITY TOLERANCE: WFL                         O2  SATURATIONS       COGNITION  Attention Span:  attends with cues to redirect  Orientation Level:  knew in hospital but not which one, thought year was 2024  Memory:  impaired working memory, decreased long term memory, and decreased short term memory  Following Commands:  follows one-step commands inconsistently, follows one step commands with increased time, and follows one step commands with repetition  Awareness of Deficits:  decreased awareness of deficits  COGNITION ASSESSMENTS       Upper Extremity:   ROM: within functional limits   Strength: is within functional limits     EDUCATION PROVIDED  Patient : Role of Occupational Therapy; Functional Transfer Techniques; Fall Prevention; Compensatory ADL Techniques  Patient's Response to Education: Does Not Demonstrate Skills Needed for Learning    Therapist comments: Patient participatory with encouragement; tolerated bed mobility min assist (baseline does not sleep in a bed per daughter), standing CGA hand-held assist, functional mobility several steps around room CGA hand-held assist; return to supine SBA. Patient declining toileting, toilet transfer, reports only wants to return to sleeping but simulated above activities SBA to CGA. Recommend initial increased supervision and check-ins in room as patient unlikely to remember to call for assist before attempting to walk to bathroom or dining liz; spoke with daughter before session who reports this can be arranged with [facility] staff. Notified RN and Discharge Planner of this recommendation as well. Daughter also states they have a wheelchair available if patient needs wheelchair assistance to dining liz. No skilled OT needs at this time. Will sign off.    Patient End of Session: Needs met;Call light within reach;All patient questions and concerns addressed;In bed;RN aware of session/findings;Alarm set    OCCUPATIONAL PROFILE    HOME SITUATION  Type of Home:  (memory care facility) [facility]  Home Layout:  One level  Lives With: Staff 24 hours    Toilet and Equipment: Comfort height toilet;Grab bar  Shower/Tub and Equipment: Walk-in shower;Shower chair  Other Equipment: None          Drives: No  Occupation: retired        Prior Level of Function: Per report of daughter Suzanne as patient is a poor historian, patient typically able to walk short distances with RW (6 steps each way to/from bathroom, about 3 doors down liz to dining room) supervision to Mod I. He is able to toilet transfer and toilet himself independently, although daughter reports patient likely is not very thorough with pericare. He receives total assist with UB/LB dressing and showering; she reports staff helps him step into shower once a week, and they provide total assist for UB/LB bathing while he sits on shower chair. Patient able to feed himself with setup, but receives total assist for grooming. Daughter reports she takes patient out of [place] every Tuesday, reports he enjoys going for car rides and reading signs, and she brings him back to her home; reports she keeps him out about 5 hours each week. She reports patient has not slept in a bed in over 10 years, only sleeps in a recliner; states he spends \"all of his life\" in the recliner apart from bathroom visit, dining liz 3 meals a day, and the weekly outings to her house.     SUBJECTIVE  \"All I want is to go back to sleep\"    PAIN ASSESSMENT  Ratin  Location: denies       OBJECTIVE  Precautions: Bed/chair alarm;NG suction  Fall Risk: High fall risk    WEIGHT BEARING RESTRICTION  Weight Bearing Restriction: None                AM-PAC ‘6-Clicks’ Inpatient Daily Activity Short Form  -   Putting on and taking off regular lower body clothing?: Total  -   Bathing (including washing, rinsing, drying)?: Total  -   Toileting, which includes using toilet, bedpan or urinal? : A Little (supervision)  -   Putting on and taking off regular upper body clothing?: Total  -   Taking care  of personal grooming such as brushing teeth?: A Little  -   Eating meals?: A Little    AM-PAC Score:  Score: 12  Approx Degree of Impairment: 66.57%  Standardized Score (AM-PAC Scale): 30.6      ADDITIONAL TESTS     NEUROLOGICAL FINDINGS        PLAN   Patient has been evaluated and presents with no skilled Occupational Therapy needs at this time.  Patient discharged from Occupational Therapy services.  Please re-order if a new functional limitation presents during this admission.      Patient Evaluation Complexity Level:   Occupational Profile/Medical History LOW - Brief history including review of medical or therapy records    Specific performance deficits impacting engagement in ADL/IADL LOW  1 - 3 performance deficits    Client Assessment/Performance Deficits LOW - No comorbidities nor modifications of tasks    Clinical Decision Making LOW - Analysis of occupational profile, problem-focused assessments, limited treatment options    Overall Complexity LOW     OT Session Time: 15 minutes

## 2023-12-28 NOTE — CONSULTS
[facility]  Report of Inpatient Wound Care Consultation    Liang Archibald Patient Status:  Inpatient    3/4/1940 MRN [id number]   Location [facility] 3SW-A Attending Miguel Nicole MD   Hosp Day # 1 PCP Victorina Morris MD     Reason for Consultation:  Multiple wounds    History of Present Illness:  Liang Archibald is a a(n) 83 year old male. Seen in room 376 with fellow wound nurse. Patient denies pain. Patient with multiple comorbidities, with present on admission skin breakdown described below.       History:  Past Medical History:   Diagnosis Date    A-fib ([facility])     Alzheimer's dementia ([facility])     Anxiety 2013    Arrhythmia     afib after cabg, resolved now    ASHD (arteriosclerotic heart disease) 10/08/2012    Atherosclerosis of coronary artery     [place] 2013    Coronary atherosclerosis of unspecified type of vessel, native or graft 1994    S/P MI, angioplasty LAD    Esophageal reflux     Essential hypertension     Hearing impairment     Heart attack (HCC) 1974    Hernia     will be having surgery in the future    High blood pressure     High cholesterol     Hyperlipidemia     Insomnia 2013    Myocardial infarction ([facility])     1974    Nausea and vomiting in adult 2023    Obstructive sleep apnea (adult) (pediatric) SPLIT NIGHT 5-1-16    AHI 6 SaO2 gary 92 % CPAP 8     SBO (small bowel obstruction) ([facility])     Unspecified sleep apnea     Visual impairment      Past Surgical History:   Procedure Laterality Date    ADENOIDECTOMY      palate surgery,deviated septum    ANGIOPLASTY (CORONARY)      one vessel    APPENDECTOMY      CABG  2014    4 bypass    CATH PERCUTANEOUS  TRANSLUMINAL CORONARY ANGIOPLASTY      COLECTOMY  2014    for bowel obstruction    COLON SURGERY      COLONOSCOPY,DIAGNOSTIC  03    normal    COLONOSCOPY,DIAGNOSTIC  3/16/13    descending polyp  at 40 cm    DRAIN/INJECT LARGE JOINT/BURSA  2014    Procedure: BURSA INJECTION;   Surgeon: Alejandro Mcmillan MD;  Location: [facility]    FLUOROSCOPIC GUIDANCE NEEDLE PLACEMENT  1/8/2014    Procedure: LUMBAR FACET INJECTION OR MEDIAL BRANCH NERVE BLOCK;  Surgeon: Alejandro Mcmillan MD;  Location: [facility]    HERNIA SURGERY      INGUINAL HERNIA REPAIR Left 4/13/2015    Procedure: HERNIA INGUINAL REPAIR ADULT;  Surgeon: Raheem Goff MD;  Location: [facility] MAIN OR    INJ PARAVERT F JNT L/S 1 LEV Right 8/5/2015    Procedure: LUMBAR FACET INJECTION OR MEDIAL BRANCH NERVE BLOCK;  Surgeon: Diony Zuniga MD;  Location: [facility]    INJ PARAVERT F JNT L/S 1 LEV Right 1/14/2016    Procedure: LUMBAR FACET INJECTION OR MEDIAL BRANCH NERVE BLOCK;  Surgeon: Diony Zuniga MD;  Location: [facility]    INJ PARAVERT F JNT L/S 1 LEV Right 2/1/2016    Procedure: LUMBAR FACET INJECTION OR MEDIAL BRANCH NERVE BLOCK;  Surgeon: Diony Zuniga MD;  Location: [facility]    INJ PARAVERT F JNT L/S 2 LEV Right 8/5/2015    Procedure: LUMBAR FACET INJECTION OR MEDIAL BRANCH NERVE BLOCK;  Surgeon: Diony Zuniga MD;  Location: [facility]    INJ PARAVERT F JNT L/S 2 LEV Right 1/14/2016    Procedure: LUMBAR FACET INJECTION OR MEDIAL BRANCH NERVE BLOCK;  Surgeon: Diony Zuniga MD;  Location: [facility]    INJ PARAVERT F JNT L/S 2 LEV Right 2/1/2016    Procedure: LUMBAR FACET INJECTION OR MEDIAL BRANCH NERVE BLOCK;  Surgeon: Diony Zuniga MD;  Location: [facility]    INJ PARAVERT F JNT L/S 3 LEV Right 8/5/2015    Procedure: LUMBAR FACET INJECTION OR MEDIAL BRANCH NERVE BLOCK;  Surgeon: Diony Zuniga MD;  Location: [facility]    INJECTION, ANESTHETIC/STEROID, TRANSFORAMINAL EPIDURAL; LUMBAR/SACRAL, ADD'L LEVEL N/A 8/20/2015    Procedure: TRANSFORAMINAL EPIDURAL - LUMBAR;  Surgeon: Alejandro Mcmillan MD;  Location: [facility]     INJECTION, ANESTHETIC/STEROID, TRANSFORAMINAL EPIDURAL; LUMBAR/SACRAL, ADD'L LEVEL Right 9/4/2015    Procedure: TRANSFORAMINAL EPIDURAL - LUMBAR;  Surgeon: Alejandro Mcmillan MD;  [place] [facility]    INJECTION, ANESTHETIC/STEROID, TRANSFORAMINAL EPIDURAL; LUMBAR/SACRAL, ADD'L LEVEL Right 3/2/2016    Procedure: TRANSFORAMINAL EPIDURAL - LUMBAR;  Surgeon: Diony Zuniga MD;  [place]: [facility]    INJECTION, ANESTHETIC/STEROID, TRANSFORAMINAL EPIDURAL; LUMBAR/SACRAL, ADD'L LEVEL Right 3/10/2016    Procedure: TRANSFORAMINAL EPIDURAL - LUMBAR;  Surgeon: Diony Zuniga MD;  [place]: [facility]    INJECTION, ANESTHETIC/STEROID, TRANSFORAMINAL EPIDURAL; LUMBAR/SACRAL, ADD'L LEVEL Right 3/29/2016    Procedure: TRANSFORAMINAL EPIDURAL - LUMBAR;  Surgeon: Diony Zuniga MD;  Location: [facility]    INJECTION, ANESTHETIC/STEROID, TRANSFORAMINAL EPIDURAL; LUMBAR/SACRAL, SINGLE LEVEL N/A 8/20/2015    Procedure: TRANSFORAMINAL EPIDURAL - LUMBAR;  Surgeon: Alejandro Mcmillan MD;  [place]: [facility]    INJECTION, ANESTHETIC/STEROID, TRANSFORAMINAL EPIDURAL; LUMBAR/SACRAL, SINGLE LEVEL Right 9/4/2015    Procedure: TRANSFORAMINAL EPIDURAL - LUMBAR;  Surgeon: Alejandro Mcmillan MD;  [place]: [facility]    INJECTION, ANESTHETIC/STEROID, TRANSFORAMINAL EPIDURAL; LUMBAR/SACRAL, SINGLE LEVEL Right 3/2/2016    Procedure: TRANSFORAMINAL EPIDURAL - LUMBAR;  Surgeon: Diony Zuniga MD;  [place]: [facility]    INJECTION, ANESTHETIC/STEROID, TRANSFORAMINAL EPIDURAL; LUMBAR/SACRAL, SINGLE LEVEL Right 3/10/2016    Procedure: TRANSFORAMINAL EPIDURAL - LUMBAR;  Surgeon: Diony Zuniga MD;  Location: [facility]    INJECTION, ANESTHETIC/STEROID, TRANSFORAMINAL EPIDURAL; LUMBAR/SACRAL, SINGLE LEVEL Right 3/29/2016    Procedure: TRANSFORAMINAL EPIDURAL - LUMBAR;  Surgeon:  Diony Zuniga MD;  Location: [facility]    INJECTION, W/WO CONTRAST, DX/THERAPEUTIC SUBSTANCE, EPIDURAL/SUBARACHNOID; LUMBAR/SACRAL N/A 6/17/2015    Procedure: LUMBAR EPIDURAL;  Surgeon: Alejandro Mcmillan MD;  Location: [facility]    INJECTION, W/WO CONTRAST, DX/THERAPEUTIC SUBSTANCE, EPIDURAL/SUBARACHNOID; LUMBAR/SACRAL N/A 9/18/2015    Procedure: LUMBAR EPIDURAL;  Surgeon: Diony Zuniga MD;  Location: [facility]    INJECTION, W/WO CONTRAST, DX/THERAPEUTIC SUBSTANCE, EPIDURAL/SUBARACHNOID; LUMBAR/SACRAL N/A 10/2/2015    Procedure: LUMBAR EPIDURAL;  Surgeon: Diony Zuniga MD;  Location: [facility]    OPEN HEART SURGICAL PROFILE      OTHER SURGICAL HISTORY      Tonsillectomy uvelectomy     OTHER SURGICAL HISTORY  10/2015    surgery for bowel obstruction    PATIENT DOCUMENTED NOT TO HAVE EXPERIENCED ANY OF THE FOLLOWING EVENTS  1/8/2014    Procedure: LUMBAR FACET INJECTION OR MEDIAL BRANCH NERVE BLOCK;  Surgeon: Alejandro Mcmillan MD;  Location: [facility]    PATIENT DOCUMENTED NOT TO HAVE EXPERIENCED ANY OF THE FOLLOWING EVENTS N/A 6/17/2015    Procedure: LUMBAR EPIDURAL;  Surgeon: Alejandro Mcmillan MD;  Location: [facility]    PATIENT DOCUMENTED NOT TO HAVE EXPERIENCED ANY OF THE FOLLOWING EVENTS Right 8/5/2015    Procedure: LUMBAR FACET INJECTION OR MEDIAL BRANCH NERVE BLOCK;  Surgeon: Diony Zuniga MD;  Location: [facility]    PATIENT DOCUMENTED NOT TO HAVE EXPERIENCED ANY OF THE FOLLOWING EVENTS N/A 8/20/2015    Procedure: TRANSFORAMINAL EPIDURAL - LUMBAR;  Surgeon: Alejandro Mcmillan MD;  Location: [facility]    PATIENT DOCUMENTED NOT TO HAVE EXPERIENCED ANY OF THE FOLLOWING EVENTS Right 9/4/2015    Procedure: TRANSFORAMINAL EPIDURAL - LUMBAR;  Surgeon: Alejandro Mcmillan MD;  Location: [facility]    PATIENT DOCUMENTED NOT TO HAVE  EXPERIENCED ANY OF THE FOLLOWING EVENTS N/A 9/18/2015    Procedure: LUMBAR EPIDURAL;  Surgeon: Diony Zuniga MD;  Location: [facility]    PATIENT DOCUMENTED NOT TO HAVE EXPERIENCED ANY OF THE FOLLOWING EVENTS N/A 10/2/2015    Procedure: LUMBAR EPIDURAL;  Surgeon: Diony Zuniga MD;  Location: [facility]    PATIENT DOCUMENTED NOT TO HAVE EXPERIENCED ANY OF THE FOLLOWING EVENTS Right 1/14/2016    Procedure: LUMBAR FACET INJECTION OR MEDIAL BRANCH NERVE BLOCK;  Surgeon: Diony Zuniga MD;  Location: [facility]    PATIENT DOCUMENTED NOT TO HAVE EXPERIENCED ANY OF THE FOLLOWING EVENTS Right 2/1/2016    Procedure: LUMBAR FACET INJECTION OR MEDIAL BRANCH NERVE BLOCK;  Surgeon: Diony Zuniga MD;  Location: [facility]    PATIENT DOCUMENTED NOT TO HAVE EXPERIENCED ANY OF THE FOLLOWING EVENTS Right 3/2/2016    Procedure: TRANSFORAMINAL EPIDURAL - LUMBAR;  Surgeon: Diony Zuniga MD;  Location: [facility]    PATIENT DOCUMENTED NOT TO HAVE EXPERIENCED ANY OF THE FOLLOWING EVENTS Right 3/10/2016    Procedure: TRANSFORAMINAL EPIDURAL - LUMBAR;  Surgeon: Diony Zuniga MD;  Location: [facility]    PATIENT DOCUMENTED NOT TO HAVE EXPERIENCED ANY OF THE FOLLOWING EVENTS Right 3/29/2016    Procedure: TRANSFORAMINAL EPIDURAL - LUMBAR;  Surgeon: Diony Zuniga MD;  Location: [facility]    PATIENT WITHOUGH PREOPERATIVE ORDER FOR IV ANTIBIOTIC SURGICAL SITE INFECTION PROPHYLAXIS.  1/8/2014    Procedure: LUMBAR FACET INJECTION OR MEDIAL BRANCH NERVE BLOCK;  Surgeon: Alejandro Mcmillan MD;  Location: [facility]    PATIENT WITHOUGH PREOPERATIVE ORDER FOR IV ANTIBIOTIC SURGICAL SITE INFECTION PROPHYLAXIS. N/A 6/17/2015    Procedure: LUMBAR EPIDURAL;  Surgeon: Alejandro Mcmillan MD;  Location: [facility]    PATIENT WITHOUGH PREOPERATIVE ORDER FOR IV ANTIBIOTIC  SURGICAL SITE INFECTION PROPHYLAXIS. Right 8/5/2015    Procedure: LUMBAR FACET INJECTION OR MEDIAL BRANCH NERVE BLOCK;  Surgeon: Diony Zuniga MD;  Location: [facility] [place] [facility]    PATIENT WITHOUGH PREOPERATIVE ORDER FOR IV ANTIBIOTIC SURGICAL SITE INFECTION PROPHYLAXIS. N/A 8/20/2015    Procedure: TRANSFORAMINAL EPIDURAL - LUMBAR;  Surgeon: Alejandro Mcmillan MD;  Location: [facility] [place] [facility]    PATIENT WITHOUGH PREOPERATIVE ORDER FOR IV ANTIBIOTIC SURGICAL SITE INFECTION PROPHYLAXIS. Right 9/4/2015    Procedure: TRANSFORAMINAL EPIDURAL - LUMBAR;  Surgeon: Alejandro Mcmillan MD;  Location: [facility] [place] [facility]    PATIENT WITHOUGH PREOPERATIVE ORDER FOR IV ANTIBIOTIC SURGICAL SITE INFECTION PROPHYLAXIS. N/A 9/18/2015    Procedure: LUMBAR EPIDURAL;  Surgeon: Diony Zuniga MD;  Location: [facility]    PATIENT WITHOUGH PREOPERATIVE ORDER FOR IV ANTIBIOTIC SURGICAL SITE INFECTION PROPHYLAXIS. N/A 10/2/2015    Procedure: LUMBAR EPIDURAL;  Surgeon: Diony Zuniga MD;  Location: [facility]    PATIENT WITHOUGH PREOPERATIVE ORDER FOR IV ANTIBIOTIC SURGICAL SITE INFECTION PROPHYLAXIS. Right 1/14/2016    Procedure: LUMBAR FACET INJECTION OR MEDIAL BRANCH NERVE BLOCK;  Surgeon: Diony Zuniga MD;  Location: [facility] [place] [facility]    PATIENT WITHOUGH PREOPERATIVE ORDER FOR IV ANTIBIOTIC SURGICAL SITE INFECTION PROPHYLAXIS. Right 2/1/2016    Procedure: LUMBAR FACET INJECTION OR MEDIAL BRANCH NERVE BLOCK;  Surgeon: Diony Zuniga MD;  Location: [facility] [place] [facility]    PATIENT WITHOUGH PREOPERATIVE ORDER FOR IV ANTIBIOTIC SURGICAL SITE INFECTION PROPHYLAXIS. Right 3/2/2016    Procedure: TRANSFORAMINAL EPIDURAL - LUMBAR;  Surgeon: Diony Zuniga MD;  Location: [facility]    PATIENT WITHOUGH PREOPERATIVE ORDER FOR IV ANTIBIOTIC SURGICAL SITE INFECTION PROPHYLAXIS. Right 3/10/2016    Procedure: TRANSFORAMINAL EPIDURAL -  LUMBAR;  Surgeon: Diony Zuniga MD;  Location: [facility] [place] [facility]    PATIENT WITHOUGH PREOPERATIVE ORDER FOR IV ANTIBIOTIC SURGICAL SITE INFECTION PROPHYLAXIS. Right 3/29/2016    Procedure: TRANSFORAMINAL EPIDURAL - LUMBAR;  Surgeon: Diony Zuniga MD;  Location: [facility]    SINUS SURGERY        deviated septum    SPECIAL SERVICE OR REPORT  2002    S/P uvulopalatoplasty, sinus surgery, septoplasty      reports that he has never smoked. He has never used smokeless tobacco. He reports current alcohol use. He reports that he does not use drugs.      Allergies:  @ALLERGY    Laboratory Data:    Recent Labs   Lab 12/26/23  2123 12/27/23  0443 12/27/23  1500 12/28/23  0454   WBC 19.0*  --  12.0* 11.2*   HGB 16.2  --  13.2 12.9*   HCT 46.8  --  39.3 38.5*   .0  --  148.0* 152.0   CREATSERUM 1.92*  --  1.61* 1.39*   BUN 27*  --  27* 27*   *  --  115* 130*   CA 9.4  --  8.9 8.9   ALB 3.3*  --  2.4* 2.6*   TP 7.8  --  6.3* 6.2*   INR 3.29* 3.98*  --  5.65*         ASSESSMENT:  Wound 12/27/23 Elbow Left;Posterior (Active)   Date First Assessed/Time First Assessed: 12/27/23 0500   Location: Elbow  Wound Location Orientation: Left;Posterior      Assessments 12/28/2023 10:22 AM   Wound Image     Drainage Amount Small   Drainage Description Sanguineous   Wound Length (cm) 4.1 cm   Wound Width (cm) 1.6 cm   Wound Surface Area (cm^2) 6.56 cm^2   Wound Depth (cm) 0 cm   Wound Volume (cm^3) 0 cm^3   Margins Well-defined edges   Non-staged Wound Description Full thickness   Lilli-wound Assessment Dry;Edema   Wound Granulation Tissue Red;Firm   Wound Bed Granulation (%) 50 %   Wound Odor None   Shape 50% intact skin flap       Wound 12/27/23 Neck Posterior;Right (Active)   Date First Assessed/Time First Assessed: 12/27/23 0500   Location: Neck  Wound Location Orientation: Posterior;Right      Assessments 12/28/2023 10:27 AM   Wound Image     Drainage Amount Small   Drainage Description  Serosanguineous   Wound Length (cm) 3.5 cm   Wound Width (cm) 1.7 cm   Wound Surface Area (cm^2) 5.95 cm^2   Wound Depth (cm) 0 cm   Wound Volume (cm^3) 0 cm^3   Margins Well-defined edges   Non-staged Wound Description Full thickness   Lilli-wound Assessment Clean;Edema   Wound Granulation Tissue Pink;Pale Man;Firm   Wound Bed Granulation (%) 85 %   Wound Bed Epithelium (%) 15 %   Wound Odor None       Wound 12/27/23 Arm Left;Lower;Posterior (Active)   Date First Assessed/Time First Assessed: 12/27/23 0500   Location: Arm  Wound Location Orientation: Left;Lower;Posterior      Assessments 12/28/2023 10:19 AM   Wound Image     Drainage Amount Small   Drainage Description Serosanguineous   Wound Length (cm) 2.4 cm   Wound Width (cm) 0.7 cm   Wound Surface Area (cm^2) 1.68 cm^2   Wound Depth (cm) 0 cm   Wound Volume (cm^3) 0 cm^3   Margins Well-defined edges   Non-staged Wound Description Full thickness   Lilli-wound Assessment Edema;Induration   Wound Granulation Tissue Pink;Pale Man;Firm   Wound Bed Granulation (%) 100 %   Wound Odor None   Shape Friable       Wound 12/27/23 Buttocks Left (Active)   Date First Assessed/Time First Assessed: 12/27/23 0500   Location: Buttocks  Wound Location Orientation: Left      Assessments 12/28/2023 10:31 AM   Wound Image     Drainage Amount Scant   Drainage Description Serosanguineous   Wound Length (cm) 0.3 cm   Wound Width (cm) 0.3 cm   Wound Surface Area (cm^2) 0.09 cm^2   Wound Depth (cm) 0 cm   Wound Volume (cm^3) 0 cm^3   Margins Well-defined edges   Non-staged Wound Description Full thickness   Lilli-wound Assessment Dry;Clean   Wound Granulation Tissue Pink;Firm   Wound Bed Granulation (%) 100 %   Wound Odor None   Pressure Injury Stage Stage 2     Left Posterior arm and Left Elbow Wound Cleaning and Dressings:  Wound cleansing:  Cleanse with normal saline or wound cleanser  Wound cleaning frequency: Daily  Wound product: Bordered foam and Xeroform gauze  Dressing change  frequency:  Change dressing daily and/or PRN    Right Neck and Left buttock Wound Cleaning and Dressings:  Wound cleansing:  Cleanse with normal saline or wound cleanser  Wound cleaning frequency: Daily  Wound product: Silver Foam  Dressing change frequency:  Change dressing daily and/or PRN    Miscellaneous/Additional Orders:  Off loading: Turn and reposition Q 2 hours. Waffle cushion for all surfaces at all times.     Care Summary:  Care Summary: Wound care summary: Discussed Plan of Care at beside with patient. Patient verbally acknowledges understanding of all instructions and all questions were answered.      Additional Notes:  Increase protein intake. Dietician or primary care provider to recommend amount of protein supplements.         Thank you for this consultation and for allowing me to participate in the care of your patient.  Please page me at #[phone] if you have any questions about this consultation and plan of care.     Time Spent 45 Minutes.    Thank you,  Annie Ordonez RN  Wound/Ostomy/Continence nurse    12/28/2023  11:22 AM

## 2023-12-28 NOTE — PLAN OF CARE
Problem: PAIN - ADULT  Goal: Verbalizes/displays adequate comfort level or patient's stated pain goal  Description: INTERVENTIONS:  - Encourage pt to monitor pain and request assistance  - Assess pain using appropriate pain scale  - Administer analgesics based on type and severity of pain and evaluate response  - Implement non-pharmacological measures as appropriate and evaluate response  - Consider cultural and social influences on pain and pain management  - Manage/alleviate anxiety  - Utilize distraction and/or relaxation techniques  - Monitor for opioid side effects  - Notify MD/LIP if interventions unsuccessful or patient reports new pain  - Anticipate increased pain with activity and pre-medicate as appropriate  Outcome: Progressing     Problem: RISK FOR INFECTION - ADULT  Goal: Absence of fever/infection during anticipated neutropenic period  Description: INTERVENTIONS  - Monitor WBC  - Administer growth factors as ordered  - Implement neutropenic guidelines  Outcome: Progressing     Problem: SAFETY ADULT - FALL  Goal: Free from fall injury  Description: INTERVENTIONS:  - Assess pt frequently for physical needs  - Identify cognitive and physical deficits and behaviors that affect risk of falls.  - [place] fall precautions as indicated by assessment.  - Educate pt/family on patient safety including physical limitations  - Instruct pt to call for assistance with activity based on assessment  - Modify environment to reduce risk of injury  - Provide assistive devices as appropriate  - Consider OT/PT consult to assist with strengthening/mobility  - Encourage toileting schedule  Outcome: Progressing     Problem: DISCHARGE PLANNING  Goal: Discharge to home or other facility with appropriate resources  Description: INTERVENTIONS:  - Identify barriers to discharge w/pt and caregiver  - Include patient/family/discharge partner in discharge planning  - Arrange for needed discharge resources and transportation as  appropriate  - Identify discharge learning needs (meds, wound care, etc)  - Arrange for interpreters to assist at discharge as needed  - Consider post-discharge preferences of patient/family/discharge partner  - Complete POLST form as appropriate  - Assess patient's ability to be responsible for managing their own health  - Refer to Case Management Department for coordinating discharge planning if the patient needs post-hospital services based on physician/LIP order or complex needs related to functional status, cognitive ability or social support system  Outcome: Progressing     Problem: GASTROINTESTINAL - ADULT  Goal: Minimal or absence of nausea and vomiting  Description: INTERVENTIONS:  - Maintain adequate hydration with IV or PO as ordered and tolerated  - Nasogastric tube to low intermittent suction as ordered  - Evaluate effectiveness of ordered antiemetic medications  - Provide nonpharmacologic comfort measures as appropriate  - Advance diet as tolerated, if ordered  - Obtain nutritional consult as needed  - Evaluate fluid balance  Outcome: Progressing  Goal: Maintains or returns to baseline bowel function  Description: INTERVENTIONS:  - Assess bowel function  - Maintain adequate hydration with IV or PO as ordered and tolerated  - Evaluate effectiveness of GI medications  - Encourage mobilization and activity  - Obtain nutritional consult as needed  - Establish a toileting routine/schedule  - Consider collaborating with pharmacy to review patient's medication profile  Outcome: Progressing  Goal: Maintains adequate nutritional intake (undernourished)  Description: INTERVENTIONS:  - Monitor percentage of each meal consumed  - Identify factors contributing to decreased intake, treat as appropriate  - Assist with meals as needed  - Monitor I&O, WT and lab values  - Obtain nutritional consult as needed  - Optimize oral hygiene and moisture  - Encourage food from home; allow for food preferences  - Enhance eating  environment  Outcome: Progressing  Goal: Achieves appropriate nutritional intake (bariatric)  Description: INTERVENTIONS:  - Monitor for over-consumption  - Identify factors contributing to increased intake, treat as appropriate  - Monitor I&O, WT and lab values  - Obtain nutritional consult as needed  - Evaluate psychosocial factors contributing to over-consumption  Outcome: Progressing     Problem: SKIN/TISSUE INTEGRITY - ADULT  Goal: Skin integrity remains intact  Description: INTERVENTIONS  - Assess and document risk factors for pressure ulcer development  - Assess and document skin integrity  - Monitor for areas of redness and/or skin breakdown  - Initiate interventions, skin care algorithm/standards of care as needed  Outcome: Progressing  Goal: Incision(s), wounds(s) or drain site(s) healing without S/S of infection  Description: INTERVENTIONS:  - Assess and document risk factors for pressure ulcer development  - Assess and document skin integrity  - Assess and document dressing/incision, wound bed, drain sites and surrounding tissue  - Implement wound care per orders  - Initiate isolation precautions as appropriate  - Initiate Pressure Ulcer prevention bundle as indicated  Outcome: Progressing  Goal: Oral mucous membranes remain intact  Description: INTERVENTIONS  - Assess oral mucosa and hygiene practices  - Implement preventative oral hygiene regimen  - Implement oral medicated treatments as ordered  Outcome: Progressing     Problem: Impaired Cognition  Goal: Patient will exhibit improved attention, thought processing and/or memory  Description: Interventions:    Outcome: Progressing     Problem: Patient/Family Goals  Goal: Patient/Family Long Term Goal  Description: Patient's Long Term Goal: benny    Interventions:  - BENNY  - See additional Care Plan goals for specific interventions  Outcome: Progressing  Goal: Patient/Family Short Term Goal  Description: Patient's Short Term Goal: to be pain  free    Interventions:   - pain meds  -Ng tube out  - See additional Care Plan goals for specific interventions  Outcome: Progressing

## 2023-12-28 NOTE — PLAN OF CARE
Pt remains forgetful, impulsive.  Large BM today.  Seen by gen surg this am.  OK to clamp NGT for 3 hours.  Residual 100 ml after 3 hours clamped.  No n/v.  NGT discontinued  as per order.  Pt to start CL diet.  Worked with therapy this afternoon.  Walked with min assist.  Family visited.  IVF's as per order.  Multiple skin tears.  Wound care and dressing changes done per wound care RN.  INR remains elevated at 5.6.  MD aware.  Moderate bloody drainage from neck skin tear, dressing changed.  Will continue to monitor.

## 2023-12-28 NOTE — CDS QUERY
DOCUMENTATION CLARIFICATION FORM    Dr. Nicole  :  Clinical information, provided below, indicates a BMI of 18.72. For accurate ICD-10-CM code assignment,   Can you please further clarify in the medical record the diagnosis associated with these findings:  ( x  ) Underweight    (   ) Other condition (please specify):  (   ) None of the above / Not applicable    Documentation from the Medical Record:     125/77  Pulse 76  Temp 97.8 °F (36.6 °C) (Temporal)  Resp 19  Wt 138 lb (62.6 kg)  SpO2 97%    BMI 18.72    Ed impression: sbo obstruction; n/v/ dehydration leukocytosis     H&P: sbo; leukocytosis; jono; dementia; afib; coumadin coagulopathy - hold coumadin      For questions regarding this query, please contact Clinical :  Iram Canas RN, CDS @ Cell [phone] or email: [email or web address]  Thank you    THIS FORM IS A PERMANENT PART OF THE MEDICAL RECORD

## 2023-12-29 ENCOUNTER — APPOINTMENT (OUTPATIENT)
Dept: GENERAL RADIOLOGY | Facility: HOSPITAL | Age: 83
End: 2023-12-29
Payer: MEDICARE

## 2023-12-29 ENCOUNTER — APPOINTMENT (OUTPATIENT)
Dept: GENERAL RADIOLOGY | Facility: HOSPITAL | Age: 83
End: 2023-12-29
Attending: HOSPITALIST
Payer: MEDICARE

## 2023-12-29 LAB
ALBUMIN SERPL-MCNC: 2.3 G/DL (ref 3.4–5)
ALBUMIN/GLOB SERPL: 0.6 {RATIO} (ref 1–2)
ALP LIVER SERPL-CCNC: 69 U/L
ALT SERPL-CCNC: 13 U/L
ANION GAP SERPL CALC-SCNC: 7 MMOL/L (ref 0–18)
AST SERPL-CCNC: 19 U/L (ref 15–37)
BASOPHILS # BLD AUTO: 0.02 X10(3) UL (ref 0–0.2)
BASOPHILS NFR BLD AUTO: 0.2 %
BILIRUB SERPL-MCNC: 1.2 MG/DL (ref 0.1–2)
BUN BLD-MCNC: 20 MG/DL (ref 9–23)
CALCIUM BLD-MCNC: 8.4 MG/DL (ref 8.5–10.1)
CHLORIDE SERPL-SCNC: 113 MMOL/L (ref 98–112)
CO2 SERPL-SCNC: 23 MMOL/L (ref 21–32)
CREAT BLD-MCNC: 1.26 MG/DL
EGFRCR SERPLBLD CKD-EPI 2021: 56 ML/MIN/1.73M2 (ref 60–?)
EOSINOPHIL # BLD AUTO: 0.02 X10(3) UL (ref 0–0.7)
EOSINOPHIL NFR BLD AUTO: 0.2 %
ERYTHROCYTE [DISTWIDTH] IN BLOOD BY AUTOMATED COUNT: 13.2 %
GLOBULIN PLAS-MCNC: 3.6 G/DL (ref 2.8–4.4)
GLUCOSE BLD-MCNC: 135 MG/DL (ref 70–99)
HCT VFR BLD AUTO: 35.5 %
HGB BLD-MCNC: 12.2 G/DL
IMM GRANULOCYTES # BLD AUTO: 0.11 X10(3) UL (ref 0–1)
IMM GRANULOCYTES NFR BLD: 1 %
INR BLD: 7.11 (ref 0.8–1.2)
LYMPHOCYTES # BLD AUTO: 0.53 X10(3) UL (ref 1–4)
LYMPHOCYTES NFR BLD AUTO: 4.8 %
MCH RBC QN AUTO: 33.1 PG (ref 26–34)
MCHC RBC AUTO-ENTMCNC: 34.4 G/DL (ref 31–37)
MCV RBC AUTO: 96.2 FL
MONOCYTES # BLD AUTO: 0.51 X10(3) UL (ref 0.1–1)
MONOCYTES NFR BLD AUTO: 4.6 %
NEUTROPHILS # BLD AUTO: 9.87 X10 (3) UL (ref 1.5–7.7)
NEUTROPHILS # BLD AUTO: 9.87 X10(3) UL (ref 1.5–7.7)
NEUTROPHILS NFR BLD AUTO: 89.2 %
OSMOLALITY SERPL CALC.SUM OF ELEC: 301 MOSM/KG (ref 275–295)
PLATELET # BLD AUTO: 153 10(3)UL (ref 150–450)
POTASSIUM SERPL-SCNC: 3.1 MMOL/L (ref 3.5–5.1)
POTASSIUM SERPL-SCNC: 3.1 MMOL/L (ref 3.5–5.1)
PROT SERPL-MCNC: 5.9 G/DL (ref 6.4–8.2)
PROTHROMBIN TIME: 62.6 SECONDS (ref 11.6–14.8)
RBC # BLD AUTO: 3.69 X10(6)UL
SODIUM SERPL-SCNC: 143 MMOL/L (ref 136–145)
WBC # BLD AUTO: 11.1 X10(3) UL (ref 4–11)

## 2023-12-29 PROCEDURE — 74021 RADEX ABDOMEN 3+ VIEWS: CPT

## 2023-12-29 PROCEDURE — 74019 RADEX ABDOMEN 2 VIEWS: CPT

## 2023-12-29 PROCEDURE — 99232 SBSQ HOSP IP/OBS MODERATE 35: CPT | Performed by: SURGERY

## 2023-12-29 PROCEDURE — 71045 X-RAY EXAM CHEST 1 VIEW: CPT | Performed by: HOSPITALIST

## 2023-12-29 PROCEDURE — 99233 SBSQ HOSP IP/OBS HIGH 50: CPT | Performed by: HOSPITALIST

## 2023-12-29 RX ORDER — GUAIFENESIN/DEXTROMETHORPHAN 100-10MG/5
5 SYRUP ORAL EVERY 4 HOURS PRN
COMMUNITY

## 2023-12-29 RX ORDER — LORAZEPAM 1 MG/1
0.5 TABLET ORAL EVERY 4 HOURS PRN
Status: DISCONTINUED | OUTPATIENT
Start: 2023-12-29 | End: 2024-01-02

## 2023-12-29 RX ORDER — NYSTATIN 100000 [USP'U]/G
1 POWDER TOPICAL 2 TIMES DAILY PRN
COMMUNITY

## 2023-12-29 RX ORDER — DEXTROSE AND SODIUM CHLORIDE 5; .45 G/100ML; G/100ML
INJECTION, SOLUTION INTRAVENOUS CONTINUOUS
Status: DISCONTINUED | OUTPATIENT
Start: 2023-12-29 | End: 2023-12-31

## 2023-12-29 RX ORDER — ASPIRIN 81 MG/1
81 TABLET ORAL EVERY MORNING
Status: DISCONTINUED | OUTPATIENT
Start: 2023-12-29 | End: 2023-12-29

## 2023-12-29 RX ORDER — METOPROLOL SUCCINATE 25 MG/1
25 TABLET, EXTENDED RELEASE ORAL
Status: DISCONTINUED | OUTPATIENT
Start: 2023-12-29 | End: 2023-12-29

## 2023-12-29 RX ORDER — POLYETHYLENE GLYCOL 3350 17 G/17G
17 POWDER, FOR SOLUTION ORAL DAILY
Status: DISCONTINUED | OUTPATIENT
Start: 2023-12-29 | End: 2023-12-29

## 2023-12-29 RX ORDER — TRIAMCINOLONE ACETONIDE 1 MG/G
1 CREAM TOPICAL DAILY PRN
COMMUNITY

## 2023-12-29 RX ORDER — ESCITALOPRAM OXALATE 10 MG/1
10 TABLET ORAL DAILY
Status: DISCONTINUED | OUTPATIENT
Start: 2023-12-29 | End: 2023-12-29

## 2023-12-29 RX ORDER — METOPROLOL TARTRATE 1 MG/ML
2.5 INJECTION, SOLUTION INTRAVENOUS EVERY 6 HOURS
Status: DISCONTINUED | OUTPATIENT
Start: 2023-12-30 | End: 2024-01-02

## 2023-12-29 RX ORDER — BENZOCAINE/MENTHOL 6 MG-10 MG
1 LOZENGE MUCOUS MEMBRANE AS NEEDED
COMMUNITY

## 2023-12-29 RX ORDER — POTASSIUM CHLORIDE 20 MEQ/1
40 TABLET, EXTENDED RELEASE ORAL ONCE
Status: COMPLETED | OUTPATIENT
Start: 2023-12-29 | End: 2023-12-29

## 2023-12-29 RX ORDER — DONEPEZIL HYDROCHLORIDE 10 MG/1
10 TABLET, FILM COATED ORAL NIGHTLY
Status: DISCONTINUED | OUTPATIENT
Start: 2023-12-29 | End: 2023-12-29

## 2023-12-29 RX ORDER — BISACODYL 10 MG
10 SUPPOSITORY, RECTAL RECTAL ONCE
Status: DISCONTINUED | OUTPATIENT
Start: 2023-12-29 | End: 2024-01-02

## 2023-12-29 RX ORDER — DOCUSATE SODIUM 100 MG/1
100 CAPSULE, LIQUID FILLED ORAL 2 TIMES DAILY
Status: DISCONTINUED | OUTPATIENT
Start: 2023-12-29 | End: 2023-12-29

## 2023-12-29 RX ORDER — METOPROLOL SUCCINATE 25 MG/1
12.5 TABLET, EXTENDED RELEASE ORAL EVERY OTHER DAY
COMMUNITY

## 2023-12-29 RX ORDER — PANTOPRAZOLE SODIUM 40 MG/1
40 TABLET, DELAYED RELEASE ORAL
Status: DISCONTINUED | OUTPATIENT
Start: 2023-12-29 | End: 2023-12-29

## 2023-12-29 RX ORDER — MEMANTINE HYDROCHLORIDE 10 MG/1
10 TABLET ORAL 2 TIMES DAILY
Status: DISCONTINUED | OUTPATIENT
Start: 2023-12-29 | End: 2023-12-29

## 2023-12-29 NOTE — PLAN OF CARE
Patient alert and oriented x2, forgetful. VSS on RA. Denies pain. Abdomen distended, bowel sounds hypoactive. Mepilex dressing to right shoulder, CDI. Mepilex dressing to left elbow, CDI. Gauze and coverlet to left arm, CDI. Mepilex to left buttocks, CDI. Refusing SCDs, ankle pumps encouraged. Up x1 with the walker and gait belt. X1 episode of emesis, gen surg made aware, patient refused antiemetic.     Plan: xray obstructive series, continue clear liquid diet, wound care     1430: patient continuing to have emesis, order to insert NG tube. NG tube inserted and confirmed correct placement with chest xray.

## 2023-12-29 NOTE — CM/SW NOTE
Pt resident at [facility]. Facility contacted this am to inform them of PT recommendations and potential DC Sat or Sun. Message left for MARY Diaz with CM's contact information.     CM/SW will remain available for DC planning and/or support.     TALA AgueroN, CMSRN    [id number]

## 2023-12-29 NOTE — PROGRESS NOTES
[facility]  Progress Note    Liang Archibald Patient Status:  Inpatient    3/4/1940 MRN [id number]   Location [facility] 3SW-A Attending Miguel Nicole MD   Hosp Day # 2 PCP Victorina Morris MD     Subjective:  The patient is resting in bed. NG tube was removed yesterday and he was started on clear liquids. He denies abdominal pain, nausea, or vomiting. He denies passing flatus or having a bowel movement since yesterday.    Objective/Physical Exam:  General: Alert, orientated x3.  Cooperative.  No apparent distress.  Vital Signs:  Blood pressure 103/59, pulse 76, temperature 98.5 °F (36.9 °C), temperature source Oral, resp. rate 16, weight 138 lb (62.6 kg), SpO2 94%.  HEENT: Normocephalic, atraumatic. No scleral icterus.  Abdomen:  Firm, slightly distended compared to yesterday, non-tender to palpation, with no rebound or guarding.  No peritoneal signs.      Labs:  CBC:    Lab Results   Component Value Date    WBC 11.1 (H) 2023    WBC 11.2 (H) 2023    WBC 12.0 (H) 2023     Lab Results   Component Value Date    HEMOGLOBIN 15.7 2013    HGB 12.2 (L) 2023    HGB 12.9 (L) 2023    HGB 13.2 2023      Lab Results   Component Value Date    .0 2023    .0 2023    .0 (L) 2023         Assessment/Plan:  Patient Active Problem List   Diagnosis    ASHD (arteriosclerotic heart disease)    HNP (herniated nucleus pulposus), lumbar    Lumbar spondylosis    Myofascial pain syndrome    Atrial fibrillation and flutter (HCC)    Spondylolisthesis of lumbar region    MIKE on CPAP    Cerebral microvascular disease    Cognitive complaints    Acute on chronic congestive heart failure (HCC)    Acute on chronic congestive heart failure, unspecified heart failure type (HCC)    Fall, initial encounter    Hypomagnesemia    Alzheimer's disease of other onset without behavioral disturbance    Weight loss, non-intentional    Hypokalemia    Leukocytosis     Hyperglycemia    Small bowel obstruction (HCC)    ACP (advance care planning)    Nausea and vomiting in adult    Dehydration    Leukocytosis, unspecified type    H/O resection of small bowel     Small bowel obstruction    Will obtain an obstructive series today.  Continue clear liquid diet until further bowel function.  Continue pain control and antiemetics as needed.  Encourage ambulation and up to chair.   GI prophylaxis with Protonix    EFE Wilkins  12/29/2023  10:14 AM     Patient tolerated NG tube clamp trial yesterday and NG tube removed.  Patient did have 3 loose stools over the past 24 hours.  On physical examination, abdomen appears somewhat more distended today.  No focal tenderness to palpation.  Obstructive series obtained and this reveals increased dilatation of small bowel.  NG tube replaced and 1800 cc of gastric contents was retrieved.  Continue NG tube to suction at this time.  N.p.o.  Continue serial abdominal examinations.  Patient may require surgical intervention should nonoperative medical management of SBO be unsuccessful    I agree with the note above and attest to its accuracy with the following changes below after my interview and examination of the patient    The patient was seen and examined.  Available labs and radiology is noted.    Sary Vera MD FACS    Please note that this report has been produced using speech recognition software and may contain errors related to that system including but not limited to errors in grammar, punctuation and spelling as well as words and phrases that possibly may have been recognized inappropriately.  If there are any questions or concerns please contact the dictating provider for clarification.    The 21st Century Cures Act makes medical notes like these available to patients in the interest of trans parency. Please be advised this is a medical document. Medical documents are intended to carry relevant information, facts as evident, and the  clinical opinion of the practitioner. The medical note is intended as peer to peer communication and may appear blunt or direct. It is written in medical language and may contain abbreviations or verbiage that are unfamiliar.   Again if there are any questions or concerns please contact the dictating provider for clarification.

## 2023-12-29 NOTE — PROGRESS NOTES
[facility]     Hospitalist Progress Note     Liang Archibald Patient Status:  Inpatient    3/4/1940 MRN [id number]   Location [facility] 3SW-A Attending Miguel Nicole MD   Hosp Day # 2 PCP Victorina Morris MD     Chief Complaint: SBO    Subjective:   Patient initially had NGT out and CLD initiated. Had BM overnight. Today with nausea and vomiting > OBS with increasing SBO.    Current medications:   docusate sodium  100 mg Oral BID    aspirin  81 mg Oral QAM    donepezil  10 mg Oral Nightly    escitalopram  10 mg Oral Daily    memantine  10 mg Oral BID    metoprolol succinate ER  25 mg Oral QAM    pantoprazole  40 mg Oral QAM AC       Objective:    Review of Systems:   Limited d/t patient factors     Vital signs:  Temp:  [98 °F (36.7 °C)-100.4 °F (38 °C)] 98 °F (36.7 °C)  Pulse:  [56-80] 80  Resp:  [16-18] 18  BP: (119-149)/(61-76) 149/64  SpO2:  [90 %-95 %] 93 %  Patient Weight for the past 72 hrs:   Weight   23 2326 138 lb (62.6 kg)     Physical Exam:    General: No acute distress.   Respiratory: Diminished   Cardiovascular: S1, S2. Regular   Abdomen: Distended BS +  Extremities: No edema.     Diagnostic Data:    Labs:  Recent Labs   Lab 23  0443 23  1500 23  0454 23  0533   WBC 19.0*  --  12.0* 11.2* 11.1*   HGB 16.2  --  13.2 12.9* 12.2*   MCV 95.3  --  96.3 97.2 96.2   .0  --  148.0* 152.0 153.0   INR 3.29* 3.98*  --  5.65*  --        Recent Labs   Lab 23  1500 23  0454 23  0533   * 130* 135*   BUN 27* 27* 20   CREATSERUM 1.61* 1.39* 1.26   CA 8.9 8.9 8.4*   ALB 2.4* 2.6* 2.3*    146* 143   K 3.3* 3.1*  3.1* 3.1*  3.1*    113* 113*   CO2 25.0 29.0 23.0   ALKPHO 71 76 69   AST 20 18 19   ALT 10* 13* 13*   BILT 0.9 0.9 1.2   TP 6.3* 6.2* 5.9*       CrCl cannot be calculated (Unknown ideal weight.).    Recent Labs   Lab 23  2123 23  0443 23  0454   PTP 34.0* 39.5* 52.2*   INR 3.29* 3.98*  5.65*            COVID-19 Lab Results    COVID-19  No results found for: \"COVID19\"    Pro-Calcitonin  No results for input(s): \"PCT\" in the last 168 hours.    Cardiac  No results for input(s): \"TROP\", \"PBNP\" in the last 168 hours.    Creatinine Kinase  No results for input(s): \"CK\" in the last 168 hours.    Inflammatory Markers  No results for input(s): \"CRP\", \"JUSTINA\", \"LDH\", \"DDIMER\" in the last 168 hours.    Recent Labs   Lab 12/26/23  2123   TROPHS 23       Imaging: Imaging data reviewed in Epic.    Medications:    docusate sodium  100 mg Oral BID    aspirin  81 mg Oral QAM    donepezil  10 mg Oral Nightly    escitalopram  10 mg Oral Daily    memantine  10 mg Oral BID    metoprolol succinate ER  25 mg Oral QAM    pantoprazole  40 mg Oral QAM AC       Assessment & Plan:    SBO, initially felt to be improving, but with nausea and vomiting 12/29, OBS with worsening SBO  NPO  NGT - re-insert  IVF   PPI > IV  Pain control  Monitor bowel function   Surgical consult   Cholecystitis? No abdominal pain, LFTs normal  Monitor abdominal exam  GERD  PPI IV  CAD sp CABG  Essential hypertension  Dyslipidemia  Atrial fibrillation  Metoprolol IV  Monitor hemodynamics  Telemetry   Coagulopathy d/t Coumadin, given Vit K 12/29  Monitor INR   Alzheimer's dementia  Hold Namenda, Aricept  Hypernatremia, resolved  Acute kidney injury, improved   MIKE  Leukocytosis, improving     Supplementary Documentation:   Quality:  DVT Prophylaxis: INR > 2     At this point Mr. Archibald is expected to be discharge to: long term care    Plan of care discussed with patient, RN and surgical team.    Miguel Nicole MD

## 2023-12-29 NOTE — PLAN OF CARE
Alert and Oriented x2-3; forgetful & impulsive. VSS. On RA. Tele-Afib. Denies pain.Voiding freely; minimal output. Multiple dressings to scattered skin tears prior to admission; see charting. Clear liquid diet, tolerating. Call light within reach at this time    Plan: Dressing changes. Medical management    0057: L buttocks/elbow dressing changed, L neck dressing saturated, changed with quick clot and silver border foam used

## 2023-12-29 NOTE — DIETARY NOTE
[facility]    NUTRITION ASSESSMENT    Unable to diagnose malnutrition criteria at this time.        NUTRITION INTERVENTION:    Meal and Snacks - resume least restricted when able .Monitor and encourage adequate PO intake.   Medical Food Supplements - add supplements once diet advanced as pt has multiple wounds .  Recommend to start nutrition support in form of Parenteral Nutrition if not able to advance in next 24 hours as pt with increased nutritional needs associated with multiple wounds         PATIENT STATUS: 12/29/23 83 year old male admitted with SBO from [facility].  NG tube removed yesterday and started on Clear liquid diet . Pt had n/v return today. Pt abd. Xray consistent with SBO.  Pt busy with testing at time of RD visit unable to perform NFPA  Pt with low BMI and emr pt wt stable at this wt for past couple of years but not able to confirm with pt.   Pt with multiple wounds and being followed by wound care.     Pt with history Reflux, HTN, CAD, Afib      ANTHROPOMETRICS:  Ht:    Wt: 62.6 kg (138 lb).   BMI: Body mass index is 18.72 kg/m².        WEIGHT HISTORY:   Weight loss: No    Wt Readings from Last 10 Encounters:   12/27/23 62.6 kg (138 lb)   04/07/22 58.8 kg (129 lb 9.6 oz)   09/09/21 59.7 kg (131 lb 11.2 oz)   02/25/21 63.4 kg (139 lb 11.2 oz)   07/09/20 65.3 kg (144 lb)   11/19/19 75.8 kg (167 lb)   07/25/18 69.4 kg (153 lb)   04/17/18 84.4 kg (186 lb)   04/16/18 84.4 kg (186 lb)   02/13/18 74.8 kg (165 lb)        NUTRITION:  Diet:       Procedures    NPO      Food Allergies: No  Cultural/Ethnic/Baptism Preferences Addressed: Yes    Percent Meals Eaten (last 3 days)       Date/Time Percent Meals Eaten (%)    12/27/23 0500 0 %    12/27/23 1955 0 %     Percent Meals Eaten (%): NPO at 12/27/23 1955 12/27/23 2344 0 %     Percent Meals Eaten (%): NPO at 12/27/23 2344 12/28/23 0739 --     Percent Meals Eaten (%): NPO at 12/28/23 0739    12/28/23 1200 --     Percent Meals Eaten (%):  NPO at 12/28/23 1200    12/28/23 1700 0 %     Percent Meals Eaten (%): NPO at 12/28/23 1700            GI system review: emesis and nausea Last BM: n/a  Skin and wounds: multiple wounds     NUTRITION RELATED PHYSICAL FINDINGS:     1. Body Fat/Muscle Mass: unable to assess at this time as pt out of room for testing/procedure. Will attempt at follow up or as able     2. Fluid Accumulation: none     NUTRITION PRESCRIPTION: 62.6kg  Calories: 9477-7023 calories/day (30-35 kcal/kg)  Protein: 75-93 grams protein/day (1.2-1.5 grams protein per kg)  Fluid: ~1 ml/kcal or per MD discretion    NUTRITION DIAGNOSIS/PROBLEM:  Inadequate oral intake related to inability to take or tolerate as evidenced by need for NPO status      MONITOR AND EVALUATE/NUTRITION GOALS:  Weight stable within 1 to 2 lbs during admission - New  Return to PO intake or advance diet in 24-48 hrs - New  Start alternative nutrition in 24-48 hrs if diet is not able to advance- New      MEDICATIONS:  Reviewed    LABS:  Reviewed    Pt is at High nutrition risk    Letha Monroy,RD,LDN

## 2023-12-30 LAB
ALBUMIN SERPL-MCNC: 2.4 G/DL (ref 3.4–5)
ALBUMIN/GLOB SERPL: 0.6 {RATIO} (ref 1–2)
ALP LIVER SERPL-CCNC: 71 U/L
ALT SERPL-CCNC: 14 U/L
ANION GAP SERPL CALC-SCNC: 5 MMOL/L (ref 0–18)
AST SERPL-CCNC: 20 U/L (ref 15–37)
BASOPHILS # BLD AUTO: 0.01 X10(3) UL (ref 0–0.2)
BASOPHILS NFR BLD AUTO: 0.1 %
BILIRUB SERPL-MCNC: 0.8 MG/DL (ref 0.1–2)
BUN BLD-MCNC: 25 MG/DL (ref 9–23)
CALCIUM BLD-MCNC: 8.7 MG/DL (ref 8.5–10.1)
CHLORIDE SERPL-SCNC: 112 MMOL/L (ref 98–112)
CO2 SERPL-SCNC: 26 MMOL/L (ref 21–32)
CREAT BLD-MCNC: 1.42 MG/DL
EGFRCR SERPLBLD CKD-EPI 2021: 49 ML/MIN/1.73M2 (ref 60–?)
EOSINOPHIL # BLD AUTO: 0.11 X10(3) UL (ref 0–0.7)
EOSINOPHIL NFR BLD AUTO: 1.6 %
ERYTHROCYTE [DISTWIDTH] IN BLOOD BY AUTOMATED COUNT: 13.1 %
GLOBULIN PLAS-MCNC: 3.8 G/DL (ref 2.8–4.4)
GLUCOSE BLD-MCNC: 123 MG/DL (ref 70–99)
HCT VFR BLD AUTO: 35 %
HGB BLD-MCNC: 11.9 G/DL
IMM GRANULOCYTES # BLD AUTO: 0.03 X10(3) UL (ref 0–1)
IMM GRANULOCYTES NFR BLD: 0.4 %
INR BLD: 6.57 (ref 0.8–1.2)
LYMPHOCYTES # BLD AUTO: 0.52 X10(3) UL (ref 1–4)
LYMPHOCYTES NFR BLD AUTO: 7.7 %
MCH RBC QN AUTO: 32.1 PG (ref 26–34)
MCHC RBC AUTO-ENTMCNC: 34 G/DL (ref 31–37)
MCV RBC AUTO: 94.3 FL
MONOCYTES # BLD AUTO: 0.39 X10(3) UL (ref 0.1–1)
MONOCYTES NFR BLD AUTO: 5.8 %
NEUTROPHILS # BLD AUTO: 5.66 X10 (3) UL (ref 1.5–7.7)
NEUTROPHILS # BLD AUTO: 5.66 X10(3) UL (ref 1.5–7.7)
NEUTROPHILS NFR BLD AUTO: 84.4 %
OSMOLALITY SERPL CALC.SUM OF ELEC: 302 MOSM/KG (ref 275–295)
PLATELET # BLD AUTO: 158 10(3)UL (ref 150–450)
POTASSIUM SERPL-SCNC: 3.6 MMOL/L (ref 3.5–5.1)
POTASSIUM SERPL-SCNC: 3.6 MMOL/L (ref 3.5–5.1)
PROT SERPL-MCNC: 6.2 G/DL (ref 6.4–8.2)
PROTHROMBIN TIME: 58.8 SECONDS (ref 11.6–14.8)
RBC # BLD AUTO: 3.71 X10(6)UL
SODIUM SERPL-SCNC: 143 MMOL/L (ref 136–145)
WBC # BLD AUTO: 6.7 X10(3) UL (ref 4–11)

## 2023-12-30 PROCEDURE — 99232 SBSQ HOSP IP/OBS MODERATE 35: CPT | Performed by: SURGERY

## 2023-12-30 PROCEDURE — 99233 SBSQ HOSP IP/OBS HIGH 50: CPT | Performed by: HOSPITALIST

## 2023-12-30 NOTE — PLAN OF CARE
12/27 ER admit N/V + SBO, Pt is AAOX2-3 very forgetful, VSS, TELE, NG to LIS, NG at 63cm, multiple skin wounds dressed, IVF, bed alarm on, room air, brief in place, up SBA W/ RW, less ABD bloating today, no N/V today, no pain reported, plan for possible diet trial Sunday, Pt doing well, all needs met, all safety measures in place, call light within reach, will CTM.

## 2023-12-30 NOTE — PROGRESS NOTES
[facility]  Progress Note    Liang Archibald Patient Status:  Inpatient    3/4/1940 MRN [id number]   Location [facility] 3SW-A Attending Miguel Nicole MD   Hosp Day # 3 PCP Victorina Morris MD     Subjective:  The patient was seen and examined at bedside.  No acute events overnight.  When speaking with the patient, he denies any abdominal pain.  His abdomen is less distended today, but continues to be generally distended.  He is unable to recall whether he has passed flatus or had a bowel movement.    Following obstructive series yesterday, NG tube was placed as he remained distended. NG tube with 2,275 cc output in the last 24 hours    Objective/Physical Exam:  /66 (BP Location: Right arm)   Pulse 72   Temp 98.6 °F (37 °C) (Oral)   Resp 21   Wt 138 lb (62.6 kg)   SpO2 94%   BMI 18.72 kg/m²     Intake/Output Summary (Last 24 hours) at 2023 1151  Last data filed at 2023 0600  Gross per 24 hour   Intake 850 ml   Output 2675 ml   Net -1825 ml         General: Alert, oriented x1. No acute distress.  HEENT: Normocephalic, atraumatic. No scleral icterus.  NG tube in place.  Pulmonary: No respiratory distress, effort normal.   Abdomen: Distended with tympany to percussion. Soft, non-tender to palpation. No rebound or guarding. No peritoneal signs.  Very well-healed midline incision  Extremities: No lower extremity edema. No clubbing or cyanosis.   Skin: Warm, dry. No jaundice.       Labs:  Lab Results   Component Value Date    WBC 6.7 2023    HGB 11.9 2023    HCT 35.0 2023    .0 2023      Lab Results   Component Value Date    PT 24.5 (H) 2014    PT 22.4 (H) 2014    PT 23.3 (H) 2014    INR 6.57 (HH) 2023    INR 7.11 (HH) 2023    INR 5.65 (HH) 2023     Lab Results   Component Value Date     2023    K 3.6 2023    K 3.6 2023     2023    CO2 26.0 2023    BUN 25 2023     CREATSERUM 1.42 12/30/2023     12/30/2023    CA 8.7 12/30/2023    ALKPHO 71 12/30/2023    ALT 14 12/30/2023    AST 20 12/30/2023    BILT 0.8 12/30/2023    ALB 2.4 12/30/2023    TP 6.2 12/30/2023          Assessment:  Patient Active Problem List   Diagnosis    ASHD (arteriosclerotic heart disease)    HNP (herniated nucleus pulposus), lumbar    Lumbar spondylosis    Myofascial pain syndrome    Atrial fibrillation and flutter (HCC)    Spondylolisthesis of lumbar region    MIKE on CPAP    Cerebral microvascular disease    Cognitive complaints    Acute on chronic congestive heart failure (HCC)    Acute on chronic congestive heart failure, unspecified heart failure type (HCC)    Fall, initial encounter    Hypomagnesemia    Alzheimer's disease of other onset without behavioral disturbance    Weight loss, non-intentional    Hypokalemia    Leukocytosis    Hyperglycemia    Small bowel obstruction (HCC)    ACP (advance care planning)    Nausea and vomiting in adult    Dehydration    Leukocytosis, unspecified type    H/O resection of small bowel     Small bowel obstruction    Plan:  Continue NG to low intermittent suction  Continue n.p.o.  Antiemetics and analgesics as needed  Encourage ambulation and up to chair  DVT prophylaxis with SCDs   GI prophylaxis with Protonix      The patient was discussed with Dr. Cabral, and she is in agreement with the assessment and plan. My total face time with this patient was 25 minutes. Greater than half of the visit was spent in counseling the patient on the above listed diagnoses and treatment options.     Vera Crook PA-C  12/30/2023  11:51 AM    ADDENDUM:     Patient was seen and examined.  The patient denies abdominal pain.  He is less distended.  He is uncertain if he has passed flatus.    General: Alert. Cooperative. No apparent distress.  Abdomen: Soft, mildly distended, no tenderness, with no rebound or guarding. No peritoneal signs.  NG tube:  1800 cc during the  first shift, 475 cc during second shift.        A/P small bowel obstruction     Continue n.p.o., NG tube, IV fluids  Ambulate  DVT prophylaxis  GI prophylaxis     This note was initiated by Vera Crook PA-C.  The PA saw the patient in conjunction with me. The PA performed a history, exam, and developed the assessment and plan. I agree with the mentioned assessment and plan and have provided further documentation of my own, if necessary.    Eneida Cabral MD

## 2023-12-30 NOTE — PROGRESS NOTES
[facility]     Hospitalist Progress Note     Liang Archibald Patient Status:  Inpatient    3/4/1940 MRN [id number]   Location [facility] 3SW-A Attending Miguel Nicole MD   Hosp Day # 3 PCP Victorina Morris MD     Chief Complaint: SBO    Subjective:   Patient doing better today. No complaints of pain. No nausea or vomiting.  Daughter at bedside.     Current medications:   bisacodyl  10 mg Rectal Once    pantoprazole  40 mg Intravenous Q24H    metoprolol  2.5 mg Intravenous Q6H       Objective:    Review of Systems:   Limited d/t patient factors     Vital signs:  Temp:  [98.3 °F (36.8 °C)-100.1 °F (37.8 °C)] 98.9 °F (37.2 °C)  Pulse:  [61-81] 61  Resp:  [16-23] 16  BP: (124-141)/(66-95) 124/75  SpO2:  [92 %-100 %] 100 %  Patient Weight for the past 72 hrs:   Weight   23 2326 138 lb (62.6 kg)     Physical Exam:    General: No acute distress.   Respiratory: Diminished   Cardiovascular: S1, S2. Regular   Abdomen: Soft BS +  Extremities: No edema.     Diagnostic Data:    Labs:  Recent Labs   Lab 23  2123 23  0443 23  1500 23  0454 23  0533 23  0542   WBC 19.0*  --  12.0* 11.2* 11.1* 6.7   HGB 16.2  --  13.2 12.9* 12.2* 11.9*   MCV 95.3  --  96.3 97.2 96.2 94.3   .0  --  148.0* 152.0 153.0 158.0   INR 3.29* 3.98*  --  5.65* 7.11* 6.57*       Recent Labs   Lab 23  0454 23  0533 23  0542   * 135* 123*   BUN 27* 20 25*   CREATSERUM 1.39* 1.26 1.42*   CA 8.9 8.4* 8.7   ALB 2.6* 2.3* 2.4*   * 143 143   K 3.1*  3.1* 3.1*  3.1* 3.6  3.6   * 113* 112   CO2 29.0 23.0 26.0   ALKPHO 76 69 71   AST 18 19 20   ALT 13* 13* 14*   BILT 0.9 1.2 0.8   TP 6.2* 5.9* 6.2*       CrCl cannot be calculated (Unknown ideal weight.).    Recent Labs   Lab 23  0454 23  0533 23  0542   PTP 52.2* 62.6* 58.8*   INR 5.65* 7.11* 6.57*            COVID-19 Lab Results    COVID-19  No results found for: \"COVID19\"    Pro-Calcitonin  No  results for input(s): \"PCT\" in the last 168 hours.    Cardiac  No results for input(s): \"TROP\", \"PBNP\" in the last 168 hours.    Creatinine Kinase  No results for input(s): \"CK\" in the last 168 hours.    Inflammatory Markers  No results for input(s): \"CRP\", \"JUSTINA\", \"LDH\", \"DDIMER\" in the last 168 hours.    Recent Labs   Lab 12/26/23 2123   TROPHS 23       Imaging: Imaging data reviewed in Epic.    Medications:    bisacodyl  10 mg Rectal Once    pantoprazole  40 mg Intravenous Q24H    metoprolol  2.5 mg Intravenous Q6H       Assessment & Plan:    SBO, initially felt to be improving, but with nausea and vomiting 12/29, OBS with worsening SBO  NPO  NGT re-inserted 12/29  IVF   PPI   Pain control  Monitor bowel function   Surgical consult   Cholecystitis? No abdominal pain, LFTs normal  Monitor abdominal exam  GERD  PPI IV  CAD sp CABG  Essential hypertension  Dyslipidemia  Atrial fibrillation  TR severe  Metoprolol IV  PTA diuretics on hold  Monitor hemodynamics  Telemetry   Coagulopathy d/t Coumadin, given Vit K 12/29  Vit K 2.5 IV x 1  Monitor INR  Start Heparin drip or therapeutic Lovenox once INR < 2   Alzheimer's dementia  Hold Namenda, Aricept  Hypernatremia, resolved  Acute kidney injury, improved   MIKE  Leukocytosis, improving     Supplementary Documentation:   Quality:  DVT Prophylaxis: INR > 2     At this point Mr. Archibald is expected to be discharge to: long term care    Plan of care discussed with patient, family and surgeon.     Miguel Nicole MD

## 2023-12-30 NOTE — PLAN OF CARE
Alert and Oriented x2-3; forgetful & impulsive. VSS. On RA. Tele-NS, hx of Afib. Denies pain.Voiding freely; minimal output. Multiple dressings to scattered skin tears prior to admission; see charting, C/D/I. NG Tube, LIS, draining, NPO. Abdomen distended, hypoactive bowel sounds. Call light within reach at this time.     Plan: Monitor bowel function/NG. Dressing changes. Medical management

## 2023-12-31 LAB
ANION GAP SERPL CALC-SCNC: 7 MMOL/L (ref 0–18)
BASOPHILS # BLD AUTO: 0.01 X10(3) UL (ref 0–0.2)
BASOPHILS NFR BLD AUTO: 0.2 %
BUN BLD-MCNC: 19 MG/DL (ref 9–23)
CALCIUM BLD-MCNC: 8.3 MG/DL (ref 8.5–10.1)
CHLORIDE SERPL-SCNC: 115 MMOL/L (ref 98–112)
CO2 SERPL-SCNC: 24 MMOL/L (ref 21–32)
CREAT BLD-MCNC: 1.2 MG/DL
EGFRCR SERPLBLD CKD-EPI 2021: 60 ML/MIN/1.73M2 (ref 60–?)
EOSINOPHIL # BLD AUTO: 0.16 X10(3) UL (ref 0–0.7)
EOSINOPHIL NFR BLD AUTO: 2.7 %
ERYTHROCYTE [DISTWIDTH] IN BLOOD BY AUTOMATED COUNT: 13.1 %
GLUCOSE BLD-MCNC: 121 MG/DL (ref 70–99)
HCT VFR BLD AUTO: 34.9 %
HGB BLD-MCNC: 11.5 G/DL
IMM GRANULOCYTES # BLD AUTO: 0.06 X10(3) UL (ref 0–1)
IMM GRANULOCYTES NFR BLD: 1 %
INR BLD: 1.58 (ref 0.8–1.2)
LYMPHOCYTES # BLD AUTO: 0.75 X10(3) UL (ref 1–4)
LYMPHOCYTES NFR BLD AUTO: 12.8 %
MAGNESIUM SERPL-MCNC: 2 MG/DL (ref 1.6–2.6)
MCH RBC QN AUTO: 31.9 PG (ref 26–34)
MCHC RBC AUTO-ENTMCNC: 33 G/DL (ref 31–37)
MCV RBC AUTO: 96.9 FL
MONOCYTES # BLD AUTO: 0.28 X10(3) UL (ref 0.1–1)
MONOCYTES NFR BLD AUTO: 4.8 %
NEUTROPHILS # BLD AUTO: 4.62 X10 (3) UL (ref 1.5–7.7)
NEUTROPHILS # BLD AUTO: 4.62 X10(3) UL (ref 1.5–7.7)
NEUTROPHILS NFR BLD AUTO: 78.5 %
OSMOLALITY SERPL CALC.SUM OF ELEC: 306 MOSM/KG (ref 275–295)
PLATELET # BLD AUTO: 150 10(3)UL (ref 150–450)
POTASSIUM SERPL-SCNC: 2.8 MMOL/L (ref 3.5–5.1)
POTASSIUM SERPL-SCNC: 3.5 MMOL/L (ref 3.5–5.1)
PROTHROMBIN TIME: 19 SECONDS (ref 11.6–14.8)
RBC # BLD AUTO: 3.6 X10(6)UL
SODIUM SERPL-SCNC: 146 MMOL/L (ref 136–145)
WBC # BLD AUTO: 5.9 X10(3) UL (ref 4–11)

## 2023-12-31 PROCEDURE — 99232 SBSQ HOSP IP/OBS MODERATE 35: CPT | Performed by: SURGERY

## 2023-12-31 PROCEDURE — 99233 SBSQ HOSP IP/OBS HIGH 50: CPT | Performed by: HOSPITALIST

## 2023-12-31 RX ORDER — ENOXAPARIN SODIUM 100 MG/ML
1 INJECTION SUBCUTANEOUS EVERY 12 HOURS SCHEDULED
Status: DISCONTINUED | OUTPATIENT
Start: 2023-12-31 | End: 2024-01-02

## 2023-12-31 RX ORDER — POTASSIUM CHLORIDE 14.9 MG/ML
20 INJECTION INTRAVENOUS ONCE
Status: COMPLETED | OUTPATIENT
Start: 2023-12-31 | End: 2023-12-31

## 2023-12-31 RX ORDER — DEXTROSE MONOHYDRATE, SODIUM CHLORIDE, AND POTASSIUM CHLORIDE 50; 1.49; 4.5 G/1000ML; G/1000ML; G/1000ML
INJECTION, SOLUTION INTRAVENOUS CONTINUOUS
Status: DISCONTINUED | OUTPATIENT
Start: 2023-12-31 | End: 2024-01-02

## 2023-12-31 NOTE — PROGRESS NOTES
[facility]  Progress Note    Liang Archibald Patient Status:  Inpatient    3/4/1940 MRN [id number]   Location [facility] 3SW-A Attending Miguel Nicole MD   Hosp Day # 4 PCP Victorina Morris MD     Subjective:  The patient is resting in bed. He is currently tolerating a clamp trial. The patient has not had a bowel movement and is uncertain if he has passed gas.     Objective/Physical Exam:  /51 (BP Location: Right arm)   Pulse 67   Temp 98.1 °F (36.7 °C) (Axillary)   Resp 14   Ht 72.01\"   Wt 138 lb (62.6 kg)   SpO2 100%   BMI 18.71 kg/m²     Intake/Output Summary (Last 24 hours) at 2023 1418  Last data filed at 2023 0622  Gross per 24 hour   Intake 2020 ml   Output 850 ml   Net 1170 ml         General: Alert, oriented x1. No acute distress.  Pulmonary: No respiratory distress, effort normal.   Abdomen: Non-distended, soft, non-tender to palpation. No rebound or guarding. No peritoneal signs.   Extremities: No lower extremity edema. No clubbing or cyanosis.   Skin: Warm, dry. No jaundice.       Labs:  Lab Results   Component Value Date    WBC 5.9 2023    HGB 11.5 2023    HCT 34.9 2023    .0 2023      Lab Results   Component Value Date    PT 24.5 (H) 2014    PT 22.4 (H) 2014    PT 23.3 (H) 2014    INR 1.58 (H) 2023    INR 6.57 (HH) 2023    INR 7.11 (HH) 2023     Lab Results   Component Value Date     2023    K 2.8 2023     2023    CO2 24.0 2023    BUN 19 2023    CREATSERUM 1.20 2023     2023    CA 8.3 2023          Assessment:  Small bowel obstruction    Plan:  Clamp NG tube and check residuals in 4 hours. If less than 200 okay to keep NG clamped and begin clear liquids  Will advance diet as patient has return of bowel function  Encourage ambulation and up to chair  Pain management as needed  DVT prophylaxis- Lovenox  GI prophylaxis-  Protonix      The patient was discussed with Dr. Cabral , and she is in agreement with the assessment and plan. My total face time with this patient was 22 minutes. Greater than half of the visit was spent in counseling the patient on the above listed diagnoses and treatment options.     Yovany Finn PA-C  12/31/2023  2:18 PM    ADDENDUM:     Patient was seen and examined.  He is without complaints.  He is not sure as to flatus.  Has not document lump since.  NG tube output has decreased dramatically.    General: Alert, orientated x3. Cooperative. No apparent distress.  Abdomen: Soft, non-distended, no tenderness, with no rebound or guarding. No peritoneal signs.        A/P SBO--improved     Clamp NG tube and check residuals after 4 hours.  If less than 200, would leave NG tube in but start clear liquids  Await further return of bowel function  DVT prophylaxis  GI prophylaxis     This note was initiated by Marcia Finn PA-C.  The PA saw the patient in conjunction with me. The PA performed a history, exam, and developed the assessment and plan. I agree with the mentioned assessment and plan and have provided further documentation of my own, if necessary.    Eneida Cabral MD

## 2023-12-31 NOTE — PLAN OF CARE
12/27 ER admit N/V + SBO, Pt is AAOX2-3 forgetful, VSS, TELE, NG to LIS, NG at 63cm, multiple skin wounds dressed, IVF, bed alarm on, room air, brief in place, up SBA W/ RW, less ABD bloating again today, no N/V today, no pain reported, NG now clamped, will monitor residual and possibly start CLD, Pt doing well, all needs met, all safety measures in place, call light within reach, will CTM.

## 2023-12-31 NOTE — PROGRESS NOTES
[facility]     Hospitalist Progress Note     Liang Archibald Patient Status:  Inpatient    3/4/1940 MRN [id number]   [facility] 3SW-A Attending Miguel Nicole MD   Hosp Day # 4 PCP Victorina Morris MD     Chief Complaint: SBO    Subjective:   Patient asleep, awakens to voice, but wants to sleep.     Current medications:   potassium chloride  40 mEq Intravenous Once    Followed by    potassium chloride  20 mEq Intravenous Once    enoxaparin  1 mg/kg Subcutaneous 2 times per day    bisacodyl  10 mg Rectal Once    pantoprazole  40 mg Intravenous Q24H    metoprolol  2.5 mg Intravenous Q6H       Objective:    Review of Systems:   Limited d/t patient factors     Vital signs:  Temp:  [97.8 °F (36.6 °C)-99 °F (37.2 °C)] 98.1 °F (36.7 °C)  Pulse:  [49-78] 50  Resp:  [15-23] 20  BP: ()/(40-79) 120/69  SpO2:  [90 %-100 %] 96 %  Patient Weight for the past 72 hrs:   Weight   23 2326 138 lb (62.6 kg)     Physical Exam:    General: No acute distress.   Respiratory: Diminished   Cardiovascular: S1, S2. Regular   Abdomen: Soft BS +  Extremities: No edema.     Diagnostic Data:    Labs:  Recent Labs   Lab 23  0443 23  1500 23  0454 23  0533 23  0542 23  0546   WBC  --  12.0* 11.2* 11.1* 6.7 5.9   HGB  --  13.2 12.9* 12.2* 11.9* 11.5*   MCV  --  96.3 97.2 96.2 94.3 96.9   PLT  --  148.0* 152.0 153.0 158.0 150.0   INR 3.98*  --  5.65* 7.11* 6.57* 1.58*       Recent Labs   Lab 23  0454 23  0533 23  0542 23  0546   * 135* 123* 121*   BUN 27* 20 25* 19   CREATSERUM 1.39* 1.26 1.42* 1.20   CA 8.9 8.4* 8.7 8.3*   ALB 2.6* 2.3* 2.4*  --    * 143 143 146*   K 3.1*  3.1* 3.1*  3.1* 3.6  3.6 2.8*   * 113* 112 115*   CO2 29.0 23.0 26.0 24.0   ALKPHO 76 69 71  --    AST 18 19 20  --    ALT 13* 13* 14*  --    BILT 0.9 1.2 0.8  --    TP 6.2* 5.9* 6.2*  --        CrCl cannot be calculated (Unknown ideal weight.).    Recent Labs    Lab 12/29/23  0533 12/30/23  0542 12/31/23  0546   PTP 62.6* 58.8* 19.0*   INR 7.11* 6.57* 1.58*            COVID-19 Lab Results    COVID-19  No results found for: \"COVID19\"    Pro-Calcitonin  No results for input(s): \"PCT\" in the last 168 hours.    Cardiac  No results for input(s): \"TROP\", \"PBNP\" in the last 168 hours.    Creatinine Kinase  No results for input(s): \"CK\" in the last 168 hours.    Inflammatory Markers  No results for input(s): \"CRP\", \"JUSTINA\", \"LDH\", \"DDIMER\" in the last 168 hours.    Recent Labs   Lab 12/26/23 2123   TROPHS 23       Imaging: Imaging data reviewed in Epic.    Medications:    potassium chloride  40 mEq Intravenous Once    Followed by    potassium chloride  20 mEq Intravenous Once    enoxaparin  1 mg/kg Subcutaneous 2 times per day    bisacodyl  10 mg Rectal Once    pantoprazole  40 mg Intravenous Q24H    metoprolol  2.5 mg Intravenous Q6H       Assessment & Plan:    SBO, initially felt to be improving, but with nausea and vomiting 12/29, OBS with worsening SBO  NPO  NGT re-inserted 12/29, clamp trial today?  IVF adjusted   PPI   Pain control  Monitor bowel function   Surgical consult   GERD  PPI IV  CAD sp CABG  Essential hypertension  Dyslipidemia  Atrial fibrillation  TR severe  Metoprolol IV  PTA diuretics on hold  Monitor hemodynamics  Telemetry   Coagulopathy d/t Coumadin, given Vit K 12/29 and 12/30  Start therapeutic Lovenox now that INR < 2  Monitor INR  Alzheimer's dementia  Hold Namenda, Aricept  Hypernatremia  IVF adjusted  Cholecystitis? No abdominal pain, LFTs normal  Acute kidney injury, improved   MIKE  Leukocytosis, improving     Supplementary Documentation:   Quality:  DVT Prophylaxis: Lovenox     At this point Mr. Archibald is expected to be discharge to: long term care    Plan of care discussed with patient (as able) and RN.    Miguel Nicole MD

## 2024-01-01 LAB
ANION GAP SERPL CALC-SCNC: 2 MMOL/L (ref 0–18)
BASOPHILS # BLD AUTO: 0.02 X10(3) UL (ref 0–0.2)
BASOPHILS NFR BLD AUTO: 0.4 %
BUN BLD-MCNC: 15 MG/DL (ref 9–23)
CALCIUM BLD-MCNC: 8 MG/DL (ref 8.5–10.1)
CHLORIDE SERPL-SCNC: 117 MMOL/L (ref 98–112)
CO2 SERPL-SCNC: 26 MMOL/L (ref 21–32)
CREAT BLD-MCNC: 1.11 MG/DL
EGFRCR SERPLBLD CKD-EPI 2021: 65 ML/MIN/1.73M2 (ref 60–?)
EOSINOPHIL # BLD AUTO: 0.21 X10(3) UL (ref 0–0.7)
EOSINOPHIL NFR BLD AUTO: 3.9 %
ERYTHROCYTE [DISTWIDTH] IN BLOOD BY AUTOMATED COUNT: 13.2 %
GLUCOSE BLD-MCNC: 116 MG/DL (ref 70–99)
HCT VFR BLD AUTO: 36.3 %
HGB BLD-MCNC: 11.5 G/DL
IMM GRANULOCYTES # BLD AUTO: 0.05 X10(3) UL (ref 0–1)
IMM GRANULOCYTES NFR BLD: 0.9 %
INR BLD: 1.47 (ref 0.8–1.2)
LYMPHOCYTES # BLD AUTO: 0.76 X10(3) UL (ref 1–4)
LYMPHOCYTES NFR BLD AUTO: 14 %
MCH RBC QN AUTO: 32.7 PG (ref 26–34)
MCHC RBC AUTO-ENTMCNC: 31.7 G/DL (ref 31–37)
MCV RBC AUTO: 103.1 FL
MONOCYTES # BLD AUTO: 0.35 X10(3) UL (ref 0.1–1)
MONOCYTES NFR BLD AUTO: 6.4 %
NEUTROPHILS # BLD AUTO: 4.05 X10 (3) UL (ref 1.5–7.7)
NEUTROPHILS # BLD AUTO: 4.05 X10(3) UL (ref 1.5–7.7)
NEUTROPHILS NFR BLD AUTO: 74.4 %
OSMOLALITY SERPL CALC.SUM OF ELEC: 302 MOSM/KG (ref 275–295)
PLATELET # BLD AUTO: 165 10(3)UL (ref 150–450)
POTASSIUM SERPL-SCNC: 3.4 MMOL/L (ref 3.5–5.1)
PROTHROMBIN TIME: 17.9 SECONDS (ref 11.6–14.8)
RBC # BLD AUTO: 3.52 X10(6)UL
SODIUM SERPL-SCNC: 145 MMOL/L (ref 136–145)
WBC # BLD AUTO: 5.4 X10(3) UL (ref 4–11)

## 2024-01-01 PROCEDURE — 99232 SBSQ HOSP IP/OBS MODERATE 35: CPT | Performed by: HOSPITALIST

## 2024-01-01 PROCEDURE — 99232 SBSQ HOSP IP/OBS MODERATE 35: CPT | Performed by: SURGERY

## 2024-01-01 RX ORDER — POLYETHYLENE GLYCOL 3350 17 G/17G
17 POWDER, FOR SOLUTION ORAL DAILY
Status: DISCONTINUED | OUTPATIENT
Start: 2024-01-01 | End: 2024-01-02

## 2024-01-01 NOTE — PROGRESS NOTES
[facility]  Progress Note    Liang Archibald Patient Status:  Inpatient    3/4/1940 MRN [id number]   [facility] 3SW-A Attending Jamison Blackburn MD   Hosp Day # 5 PCP Victorina Morris MD     Subjective:  The patient is resting comfortably.  He denies new complaints.  He denies nausea or vomiting.  NG tube is in place, clamped.  He is tolerating clear liquids.    Objective/Physical Exam:  General: Awake.  Cooperative.  No apparent distress.  Vital Signs:  Blood pressure 124/70, pulse 57, temperature 98.9 °F (37.2 °C), temperature source Oral, resp. rate 20, height 72.01\", weight 138 lb (62.6 kg), SpO2 100%.  Lungs: No respiratory distress.  Cardiac: Regular rate and rhythm.   Abdomen:  Soft, non distended, non tender, with no rebound or guarding.  No peritoneal signs.   Extremities:  No lower extremity edema noted.        Labs:  Lab Results   Component Value Date    WBC 5.4 2024    HGB 11.5 2024    HCT 36.3 2024    .0 2024     Lab Results   Component Value Date     2024    K 3.4 2024     2024    CO2 26.0 2024    BUN 15 2024    CREATSERUM 1.11 2024     2024    CA 8.0 2024     Lab Results   Component Value Date    PT 24.5 (H) 2014    PT 22.4 (H) 2014    PT 23.3 (H) 2014    INR 1.47 (H) 2024    INR 1.58 (H) 2023    INR 6.57 (HH) 2023       I/O last 3 completed shifts:  In:  [I.V.:]  Out: 1160 [Urine:600; Emesis/NG output:560]  I/O this shift:  In: -   Out: 600 [Urine:600]    Assessment  Patient Active Problem List   Diagnosis    ASHD (arteriosclerotic heart disease)    HNP (herniated nucleus pulposus), lumbar    Lumbar spondylosis    Myofascial pain syndrome    Atrial fibrillation and flutter (HCC)    Spondylolisthesis of lumbar region    MIKE on CPAP    Cerebral microvascular disease    Cognitive complaints    Acute on chronic congestive heart failure  (HCC)    Acute on chronic congestive heart failure, unspecified heart failure type (HCC)    Fall, initial encounter    Hypomagnesemia    Alzheimer's disease of other onset without behavioral disturbance    Weight loss, non-intentional    Hypokalemia    Leukocytosis    Hyperglycemia    Small bowel obstruction (HCC)    ACP (advance care planning)    Nausea and vomiting in adult    Dehydration    Leukocytosis, unspecified type    H/O resection of small bowel       SBO    Plan:  Remove NG tube.  Start full liquid diet.  Advance diet to soft diet as tolerated.  Encouraged ambulation and up to chair.  Physical therapy consulted.      Quin Shell PA-C  1/1/2024  2:25 PM    Patient somewhat more talkative today.  NG tube was clamped and the patient is tolerating clear liquid diet.  Will remove NG tube at this time.  Advance to full liquid diet.  As the patient can ambulate, would encourage ambulation to facilitate bowel function.  Discussed with patient's nurse at the bedside.  No indication for emergent surgical intervention at this time.    I agree with the note above and attest to its accuracy with the following changes below after my interview and examination of the patient    The patient was seen and examined.  Available labs and radiology is noted.    Sary Vera MD FACS    Please note that this report has been produced using speech recognition software and may contain errors related to that system including but not limited to errors in grammar, punctuation and spelling as well as words and phrases that possibly may have been recognized inappropriately.  If there are any questions or concerns please contact the dictating provider for clarification.    The 21st Century Cures Act makes medical notes like these available to patients in the interest of trans parency. Please be advised this is a medical document. Medical documents are intended to carry relevant information, facts as evident, and the clinical opinion  of the practitioner. The medical note is intended as peer to peer communication and may appear blunt or direct. It is written in medical language and may contain abbreviations or verbiage that are unfamiliar.   Again if there are any questions or concerns please contact the dictating provider for clarification.

## 2024-01-01 NOTE — PLAN OF CARE
A&O x2, forgetful, impulsive at times. VSS on RA. . Denies pain. Up with min assist with walker. Voids freely, incontinent of bladder and bowel. Bilateral SCDs. Wound dressings C/D/I, changed tonight. IV abx and IVF infusing as ordered. NGT back to LIS at 1930. 10mL residual at 2010. NGT clamped but remains in place per orders, start CLD. Reviewed POC, pain management, IS use, and fall precautions with pt. Bed alarm on w/bed in lowest position. Pt reminded to use call light. Verbalized understanding. Will continue to monitor.

## 2024-01-01 NOTE — PROGRESS NOTES
[facility]     Hospitalist Progress Note     Liang Archibald Patient Status:  Inpatient    3/4/1940 MRN [id number]   Location [facility] 3SW-A Attending Miguel Nicole MD   Hosp Day # 5 PCP Victorina Morris MD     Chief Complaint: SBO    Subjective:   Feeling better     Current medications:   enoxaparin  1 mg/kg Subcutaneous 2 times per day    bisacodyl  10 mg Rectal Once    pantoprazole  40 mg Intravenous Q24H    metoprolol  2.5 mg Intravenous Q6H       Objective:    Review of Systems:   Limited d/t patient factors     Vital signs:  Temp:  [97.6 °F (36.4 °C)-98.7 °F (37.1 °C)] 97.6 °F (36.4 °C)  Pulse:  [53-79] 53  Resp:  [14-23] 23  BP: (118-138)/(34-69) 137/56  SpO2:  [94 %-100 %] 99 %  Patient Weight for the past 72 hrs:   Weight   23 2326 138 lb (62.6 kg)     Physical Exam:    General: No acute distress.   Respiratory: Diminished   Cardiovascular: S1, S2. Regular   Abdomen: Soft BS +, NT   Extremities: No edema.     Diagnostic Data:    Labs:  Recent Labs   Lab 23  0454 23  0533 23  0542 23  0546 24  0553   WBC 11.2* 11.1* 6.7 5.9 5.4   HGB 12.9* 12.2* 11.9* 11.5* 11.5*   MCV 97.2 96.2 94.3 96.9 103.1*   .0 153.0 158.0 150.0 165.0   INR 5.65* 7.11* 6.57* 1.58* 1.47*       Recent Labs   Lab 23  0454 23  0533 23  0542 23  0546 23  1651 24  0553   * 135* 123* 121*  --  116*   BUN 27* 20 25* 19  --  15   CREATSERUM 1.39* 1.26 1.42* 1.20  --  1.11   CA 8.9 8.4* 8.7 8.3*  --  8.0*   ALB 2.6* 2.3* 2.4*  --   --   --    * 143 143 146*  --  145   K 3.1*  3.1* 3.1*  3.1* 3.6  3.6 2.8* 3.5 3.4*   * 113* 112 115*  --  117*   CO2 29.0 23.0 26.0 24.0  --  26.0   ALKPHO 76 69 71  --   --   --    AST 18 19 20  --   --   --    ALT 13* 13* 14*  --   --   --    BILT 0.9 1.2 0.8  --   --   --    TP 6.2* 5.9* 6.2*  --   --   --        Estimated Creatinine Clearance: 43.9 mL/min (based on SCr of 1.11  mg/dL).    Recent Labs   Lab 12/30/23  0542 12/31/23  0546 01/01/24  0553   PTP 58.8* 19.0* 17.9*   INR 6.57* 1.58* 1.47*            COVID-19 Lab Results    COVID-19  No results found for: \"COVID19\"    Pro-Calcitonin  No results for input(s): \"PCT\" in the last 168 hours.    Cardiac  No results for input(s): \"TROP\", \"PBNP\" in the last 168 hours.    Creatinine Kinase  No results for input(s): \"CK\" in the last 168 hours.    Inflammatory Markers  No results for input(s): \"CRP\", \"JUSTINA\", \"LDH\", \"DDIMER\" in the last 168 hours.    Recent Labs   Lab 12/26/23 2123   TROPHS 23       Imaging: Imaging data reviewed in Epic.    Medications:    enoxaparin  1 mg/kg Subcutaneous 2 times per day    bisacodyl  10 mg Rectal Once    pantoprazole  40 mg Intravenous Q24H    metoprolol  2.5 mg Intravenous Q6H       Assessment & Plan:    SBO, initially felt to be improving, but with nausea and vomiting 12/29, OBS with worsening SBO  NPO  NGT re-inserted 12/29, clamp trial and removed this AM   IVF adjusted   PPI   Surgical consulted  GERD  PPI IV  CAD sp CABG  Essential hypertension  Dyslipidemia  Atrial fibrillation  TR severe  Metoprolol IV  PTA diuretics on hold  Monitor hemodynamics  Telemetry   Coagulopathy d/t Coumadin, given Vit K 12/29 and 12/30  Cont therapeutic Lovenox now that INR < 2  Monitor INR  Alzheimer's dementia  Hold Namenda, Aricept  Hypernatremia  IVF adjusted and improved   Cholecystitis? No abdominal pain, LFTs normal  Acute kidney injury, improved   MIKE  Leukocytosis, improving     Supplementary Documentation:   Quality:  DVT Prophylaxis: Lovenox     At this point Mr. Archibald is expected to be discharge to: long term care    Plan of care discussed with patient (as able) and RN.    Jamison Blackburn MD

## 2024-01-01 NOTE — PLAN OF CARE
12/27 ER admit N/V + SBO, Pt is AAOX2-3 forgetful, VSS, TELE, NG removed, multiple skin wounds dressed, IVF, bed alarm on, room air, brief in place, up SBA W/ RW, less ABD bloating again today, no N/V today with PO intake, no pain reported, poor appetite today, Pt doing well, all needs met, all safety measures in place, call light within reach, will CTM.

## 2024-01-02 VITALS
WEIGHT: 138 LBS | SYSTOLIC BLOOD PRESSURE: 137 MMHG | OXYGEN SATURATION: 93 % | TEMPERATURE: 98 F | BODY MASS INDEX: 18.69 KG/M2 | RESPIRATION RATE: 19 BRPM | HEART RATE: 80 BPM | DIASTOLIC BLOOD PRESSURE: 81 MMHG | HEIGHT: 72.01 IN

## 2024-01-02 LAB
INR BLD: 2.01 (ref 0.8–1.2)
POTASSIUM SERPL-SCNC: 3.8 MMOL/L (ref 3.5–5.1)
PROTHROMBIN TIME: 23 SECONDS (ref 11.6–14.8)

## 2024-01-02 PROCEDURE — 99239 HOSP IP/OBS DSCHRG MGMT >30: CPT | Performed by: HOSPITALIST

## 2024-01-02 RX ORDER — ENEMA 19; 7 G/133ML; G/133ML
1 ENEMA RECTAL ONCE AS NEEDED
Status: DISCONTINUED | OUTPATIENT
Start: 2024-01-02 | End: 2024-01-02

## 2024-01-02 RX ORDER — DOCUSATE SODIUM 100 MG/1
100 CAPSULE, LIQUID FILLED ORAL 2 TIMES DAILY
Status: DISCONTINUED | OUTPATIENT
Start: 2024-01-02 | End: 2024-01-02

## 2024-01-02 NOTE — CM/SW NOTE
Noted DC orders for today. Per pt's RN, pt needs to tolerate diet before DC. Soft diet ordered. Taylor Anderson contacted to confirm pt can return today. Spoke to Griselda HERNANDEZ, Pt may return as recommended.     RN to call report to Taylor Villalobos at [phone]    Pt's son and ENE Tavera contacted to discuss transport for pt. VM left.       Addendum:  1310: Call received from pt's son Senthil. He will pick pt up at 3:00 pm. He will call pt's RN when he is close to the hospital and requests staff wheel pt down to the entrance.        CM/SW will remain available for DC planning and/or support.     TALA AgueroN, CMSRN    [phone]

## 2024-01-02 NOTE — PROGRESS NOTES
[facility]     Hospitalist Progress Note     Liang Archibald Patient Status:  Inpatient    3/4/1940 MRN [id number]   [facility] 3SW-A Attending Miguel Nicole MD   Hosp Day # 6 PCP Victorina Morris MD     Chief Complaint: SBO    Subjective:      Feeling better     Current medications:   polyethylene glycol (PEG 3350)  17 g Oral Daily    enoxaparin  1 mg/kg Subcutaneous 2 times per day    bisacodyl  10 mg Rectal Once    pantoprazole  40 mg Intravenous Q24H    metoprolol  2.5 mg Intravenous Q6H       Objective:    Review of Systems:   Limited d/t patient factors     Vital signs:  Temp:  [97.6 °F (36.4 °C)-99.4 °F (37.4 °C)] 98.6 °F (37 °C)  Pulse:  [51-72] 72  Resp:  [16-23] 16  BP: (117-137)/(56-90) 132/90  SpO2:  [99 %-100 %] 99 %  Patient Weight for the past 72 hrs:   Weight   23 2326 138 lb (62.6 kg)     Physical Exam:    General: No acute distress.   Respiratory: Diminished   Cardiovascular: S1, S2. Regular   Abdomen: Soft BS +, NT   Extremities: No edema.     Diagnostic Data:    Labs:  Recent Labs   Lab 23  0454 23  0533 23  0542 23  0546 24  0553   WBC 11.2* 11.1* 6.7 5.9 5.4   HGB 12.9* 12.2* 11.9* 11.5* 11.5*   MCV 97.2 96.2 94.3 96.9 103.1*   .0 153.0 158.0 150.0 165.0   INR 5.65* 7.11* 6.57* 1.58* 1.47*       Recent Labs   Lab 23  0454 23  0533 23  0542 23  0546 23  1651 24  0553 24  0611   * 135* 123* 121*  --  116*  --    BUN 27* 20 25* 19  --  15  --    CREATSERUM 1.39* 1.26 1.42* 1.20  --  1.11  --    CA 8.9 8.4* 8.7 8.3*  --  8.0*  --    ALB 2.6* 2.3* 2.4*  --   --   --   --    * 143 143 146*  --  145  --    K 3.1*  3.1* 3.1*  3.1* 3.6  3.6 2.8* 3.5 3.4* 3.8   * 113* 112 115*  --  117*  --    CO2 29.0 23.0 26.0 24.0  --  26.0  --    ALKPHO 76 69 71  --   --   --   --    AST 18 19 20  --   --   --   --    ALT 13* 13* 14*  --   --   --   --    BILT 0.9 1.2 0.8  --   --    --   --    TP 6.2* 5.9* 6.2*  --   --   --   --        Estimated Creatinine Clearance: 43.9 mL/min (based on SCr of 1.11 mg/dL).    Recent Labs   Lab 12/30/23  0542 12/31/23  0546 01/01/24  0553   PTP 58.8* 19.0* 17.9*   INR 6.57* 1.58* 1.47*            COVID-19 Lab Results    COVID-19  No results found for: \"COVID19\"    Pro-Calcitonin  No results for input(s): \"PCT\" in the last 168 hours.    Cardiac  No results for input(s): \"TROP\", \"PBNP\" in the last 168 hours.    Creatinine Kinase  No results for input(s): \"CK\" in the last 168 hours.    Inflammatory Markers  No results for input(s): \"CRP\", \"JUSTINA\", \"LDH\", \"DDIMER\" in the last 168 hours.    Recent Labs   Lab 12/26/23 2123   TROPHS 23       Imaging: Imaging data reviewed in Epic.    Medications:    polyethylene glycol (PEG 3350)  17 g Oral Daily    enoxaparin  1 mg/kg Subcutaneous 2 times per day    bisacodyl  10 mg Rectal Once    pantoprazole  40 mg Intravenous Q24H    metoprolol  2.5 mg Intravenous Q6H       Assessment & Plan:    SBO, initially felt to be improving, but with nausea and vomiting 12/29, OBS with worsening SBO  NPO  NGT re-inserted 12/29, removed tolerating diet   IVF adjusted   PPI   Surgical consulted  GERD  PPI IV  CAD sp CABG  Essential hypertension  Dyslipidemia  Atrial fibrillation  TR severe  Metoprolol IV  PTA diuretics on hold  Monitor hemodynamics  Telemetry   Coagulopathy d/t Coumadin, given Vit K 12/29 and 12/30  Cont therapeutic Lovenox now that INR < 2  Monitor INR  Alzheimer's dementia  Hold Namenda, Aricept  Hypernatremia  IVF adjusted and improved   Cholecystitis? No abdominal pain, LFTs normal  Acute kidney injury, improved   MIKE  Leukocytosis, improving       DC planning once ok with Sx and tolerating diet     Supplementary Documentation:   Quality:  DVT Prophylaxis: Lovenox     At this point Mr. Archibald is expected to be discharge to: long term care    Plan of care discussed with patient (as able) and RN.    Jamison Blackburn MD

## 2024-01-02 NOTE — PLAN OF CARE
A&O x2, forgetful, impulsive at times. VSS on RA. . Denies pain. Up with min assist with walker. Voids freely, incontinent of bladder and bowel. Bilateral SCDs. Wound dressings C/D/I, changed tonight. IV abx and IVF infusing as ordered. CLD. PT to see. Reviewed POC, pain management, IS use, and fall precautions with pt. Bed alarm on w/bed in lowest position. Pt reminded to use call light. Verbalized understanding. Will continue to monitor.

## 2024-01-02 NOTE — DIETARY NOTE
[facility]    NUTRITION ASSESSMENT    Unable to diagnose malnutrition criteria at this time.    NUTRITION INTERVENTION:    Meal and Snacks - Diet advanced to Low Fiber/Soft today per Surgery.   Medical Food Supplements - Ensure Plus High Protein BID; at lunch and dinner .      PATIENT STATUS:    1/2/24- Pt with previous NPO/Clear Liquid status x ~6 days. NG re-inserted on 12/29 and then removed on 1/1. Diet advanced to Full Liquids on 1/1. Pt tolerated Full Liquids for breakfast this AM per RN report. Diet advanced to Low Fiber/Soft today. Will add ONS BID to help maximize nutrition intakes. Will continue to monitor.    12/29/23- 83 year old male admitted with SBO from [facility].  NG tube removed yesterday and started on Clear liquid diet . Pt had n/v return today. Pt abd. Xray consistent with SBO.  Pt busy with testing at time of RD visit unable to perform NFPA  Pt with low BMI and emr pt wt stable at this wt for past couple of years but not able to confirm with pt.   Pt with multiple wounds and being followed by wound care.     PMH: Reflux, HTN, CAD, Afib, dementia      ANTHROPOMETRICS:  Ht:  6' 0\"  Wt: 62.6 kg (138 lb).   BMI: Body mass index is 18.71 kg/m².  IBW: 81 kg      WEIGHT HISTORY:   Noted no significant wt changes per EMR hx.    Wt Readings from Last 10 Encounters:   12/27/23 62.6 kg (138 lb)   04/07/22 58.8 kg (129 lb 9.6 oz)   09/09/21 59.7 kg (131 lb 11.2 oz)   02/25/21 63.4 kg (139 lb 11.2 oz)   07/09/20 65.3 kg (144 lb)   11/19/19 75.8 kg (167 lb)   07/25/18 69.4 kg (153 lb)   04/17/18 84.4 kg (186 lb)   04/16/18 84.4 kg (186 lb)   02/13/18 74.8 kg (165 lb)        NUTRITION:  Diet:       Procedures    Low Fiber/Soft diet Low Fiber/Soft; Is Patient on Accuchecks? No      Food Allergies: No  Cultural/Ethnic/Voodoo Preferences Addressed: Yes    Percent Meals Eaten (last 3 days)       Date/Time Percent Meals Eaten (%)    12/31/23 1152 0 %     Percent Meals Eaten (%): NPO at 12/31/23  1152            GI system review:  Last BM:12/29  Skin and wounds: wound to R neck, L arm and elbow (stages not specified), stage 2 pressure ulcer to L buttock per RN documentation    NUTRITION RELATED PHYSICAL FINDINGS:     1. Body Fat/Muscle Mass:  NIGEL. Pt sound asleep.     2. Fluid Accumulation: none per RN documentation     NUTRITION PRESCRIPTION: 62.6 kg-138 lb (12/27)  Calories: 3090-6991 calories/day (30-35 kcal/kg)  Protein: 75-94 grams protein/day (1.2-1.5 grams protein per kg)  Fluid: ~1 ml/kcal or per MD discretion    NUTRITION DIAGNOSIS/PROBLEM:  Inadequate oral intake related to inability to take or tolerate as evidenced by  previous NPO/Clear Liquid status x 6 days. (Resolving with diet advancement)      MONITOR AND EVALUATE/NUTRITION GOALS:  PO intake of 75% of meals TID - New  PO intake of 75% of oral nutrition supplement/s - New  Weight stable within 1 to 2 lbs during admission - Ongoing  Return to PO intake or advance diet in 24-48 hrs - Resolved      MEDICATIONS:  Miralax    LABS:  K+:3.8, (1/1):Glu:116, K+:3.4    Pt is at Moderate nutrition risk    Bailee Hewitt MS, RD, LDN  Clinical Dietitian  Ext:[phone]

## 2024-01-02 NOTE — PROGRESS NOTES
NURSING DISCHARGE NOTE    Discharged Home via Wheelchair.  Accompanied by Support staff  Belongings Taken by patient/family.    Report given to RN at facility. All questions answered at this time. DC paperwork given to patient

## 2024-01-02 NOTE — PHYSICAL THERAPY NOTE
PHYSICAL THERAPY RE-EVALUATION - INPATIENT     Room Number: 376/376-A  Evaluation Date: 1/2/2024  Type of Evaluation: Re-evaluation  Physician Order: PT Eval and Treat    Presenting Problem: SBO  Co-Morbidities : afib, Alzheimers dementia, CHF, CAD, HTN, MI, CABG  Reason for Therapy: Mobility Dysfunction and Discharge Planning    ASSESSMENT   Pt is a 83 year old male admitted on 12/26/2023 for SBO. Functional outcome measures completed include [facility].  The AM-PAC '6-Clicks' Inpatient Basic Mobility Short Form was completed and this patient is demonstrating a Approx Degree of Impairment: 46.58%  degree of impairment in mobility. Research supports that patients with this level of impairment may benefit from home with incr supervision.  PT Discharge Recommendations: Home (increased supervision)      PLAN  Patient has been evaluated and presents with no skilled Physical Therapy needs at this time.  Patient discharged from Physical Therapy services.  Please re-order if a new functional limitation presents during this admission.    GOALS  Patient was able to achieve the following goals ...    Patient was able to transfer At previous, functional level  Safely w/ assist   Patient able to ambulate on level surfaces At previous, functional level  CGA with RW     HOME SITUATION  Type of Home: Other (Comment) (Memory Care)   Home Layout: One level                Lives With: Staff 24 hours  Drives: No  Patient Owned Equipment: Rolling walker;Wheelchair       Prior Level of [place]: Per initial eval completed on 12/28- OT spoke with pt's dtr as pt is poor historian - pt typically ambulates a very short distance to and from bathroom with RW and to the dining liz (3 rooms down), otherwise stays in his recliner. Pt sleeps in the recliner. Pt goes on outings with dtr for a few hours at a time. Per pt's dtr, pt can have incr supervision initially at time of dc back to memory care.       SUBJECTIVE  \"Where's the thing with  the red button?\"      OBJECTIVE  Precautions: Bed/chair alarm  Fall Risk: High fall risk    WEIGHT BEARING RESTRICTION  Weight Bearing Restriction: None                PAIN ASSESSMENT  Ratin          COGNITION  Overall Cognitive Status:  Impaired and pt with Alzheimer's dementia    RANGE OF MOTION AND STRENGTH ASSESSMENT  Upper extremity ROM and strength are within functional limits     Lower extremity ROM is within functional limits     Lower extremity strength is within functional limits       BALANCE  Static Sitting: Good  Dynamic Sitting: Fair +  Static Standing: Fair -  Dynamic Standing: Poor +    ADDITIONAL TESTS                                    ACTIVITY TOLERANCE                         O2 WALK       NEUROLOGICAL FINDINGS                        AM-PAC '6-Clicks' INPATIENT SHORT FORM - BASIC MOBILITY  How much difficulty does the patient currently have...  Patient Difficulty: Turning over in bed (including adjusting bedclothes, sheets and blankets)?: A Little   Patient Difficulty: Sitting down on and standing up from a chair with arms (e.g., wheelchair, bedside commode, etc.): A Little   Patient Difficulty: Moving from lying on back to sitting on the side of the bed?: A Little   How much help from another person does the patient currently need...   Help from Another: Moving to and from a bed to a chair (including a wheelchair)?: A Little   Help from Another: Need to walk in hospital room?: A Little   Help from Another: Climbing 3-5 steps with a railing?: A Little       AM-PAC Score:  Raw Score: 18   Approx Degree of Impairment: 46.58%   Standardized Score (AM-PAC Scale): 43.63   CMS Modifier (G-Code): CK    FUNCTIONAL ABILITY STATUS  Gait Assessment   Functional Mobility/Gait Assessment  Gait Assistance: Contact guard assist  Distance (ft): 50  Assistive Device: Rolling walker  Pattern: Shuffle    Skilled Therapy Provided     Bed Mobility: pt sleeps in recliner chair at home   Rolling: NT  Supine to sit:  supervision with incr time   Sit to supine: supervision with incr time      Transfer Mobility:  Sit to stand: Christiano to RW- v/c for hand placemen   Stand to sit: Christiano  Gait = CGA w/ RW x 50 feet, shuffle pattern.     Therapist's comments: Patient presents laying in bed with [place] bears hat pulled over his face. Pt pleasantly confused- initially not agreeable to work with therapy but with encouragement agrees to walk. Bed mobility at supervision with incr time. Sit to stand Christiano to RW. Ambulated 50 feet w/ RW at CGA. Once back in room bent over and was looking at what was under his bed (tissues), with no loss of balance. Pt requesting to return to bed at end of session. Pt continues to be functioning at his baseline level and has no skilled PT needs at this time. If change in functional mobility status occurs please re-order therapy services.  RN staff aware pt needs to be up and walking multiple times a day.     Exercise/Education Provided:  Bed mobility  Body mechanics  Energy conservation  Functional activity tolerated  Gait training  Transfer training    Patient End of Session: In bed;Needs met;Call light within reach;RN aware of session/findings;All patient questions and concerns addressed;Alarm set;Discussed recommendations with /    Patient Evaluation Complexity Level:  History Moderate - 1 or 2 personal factors and/or co-morbidities   Examination of body systems Low - addressing 1-2 elements   Clinical Presentation Low - Stable   Clinical Decision Making Low Complexity       PT Session Time: 15 minutes  Gait Training:  minutes  Therapeutic Activity:  minutes  Neuromuscular Re-education:  minutes  Therapeutic Exercise:  minutes

## 2024-01-02 NOTE — PROGRESS NOTES
[facility]  Progress Note    Liang Archibald Patient Status:  Inpatient    3/4/1940 MRN [id number]   Location [facility] 3SW-A Attending Jamison Blackburn MD   Hosp Day # 6 PCP Victorina Morris MD     Subjective:  The patient was seen and examined at bedside.  No acute events overnight.  The patient states he has no complaints today.  He denies any abdominal pain.  He states he is tolerating a full liquid diet.  He denies nausea or vomiting.    Objective/Physical Exam:  /90 (BP Location: Right arm)   Pulse 72   Temp 98.6 °F (37 °C) (Oral)   Resp 16   Ht 72.01\"   Wt 138 lb (62.6 kg)   SpO2 99%   BMI 18.71 kg/m²     Intake/Output Summary (Last 24 hours) at 2024 1140  Last data filed at 2024 0829  Gross per 24 hour   Intake 2002 ml   Output 550 ml   Net 1452 ml         General: Alert, oriented x1. No acute distress.  HEENT: Normocephalic, atraumatic. No scleral icterus.  Pulmonary: No respiratory distress, effort normal.   Abdomen: Mildly distended with tympany to percussion. Soft, non-tender to palpation. No rebound or guarding. No peritoneal signs.  Very well-healed midline incision.  Extremities: No lower extremity edema. No clubbing or cyanosis.   Skin: Warm, dry. No jaundice.       Labs:      Lab Results   Component Value Date    PT 24.5 (H) 2014    PT 22.4 (H) 2014    PT 23.3 (H) 2014    INR 2.01 (H) 2024    INR 1.47 (H) 2024    INR 1.58 (H) 2023     Lab Results   Component Value Date    K 3.8 2024          Assessment:  Patient Active Problem List   Diagnosis    ASHD (arteriosclerotic heart disease)    HNP (herniated nucleus pulposus), lumbar    Lumbar spondylosis    Myofascial pain syndrome    Atrial fibrillation and flutter (HCC)    Spondylolisthesis of lumbar region    MIKE on CPAP    Cerebral microvascular disease    Cognitive complaints    Acute on chronic congestive heart failure (HCC)    Acute on chronic congestive heart failure,  unspecified heart failure type (HCC)    Fall, initial encounter    Hypomagnesemia    Alzheimer's disease of other onset without behavioral disturbance    Weight loss, non-intentional    Hypokalemia    Leukocytosis    Hyperglycemia    Small bowel obstruction (HCC)    ACP (advance care planning)    Nausea and vomiting in adult    Dehydration    Leukocytosis, unspecified type    H/O resection of small bowel     Small bowel obstruction    Plan:  The patient does not require any acute surgical intervention at this time  Advance to soft diet.  Once tolerating a diet, the patient may discharge home from a general surgical standpoint  Bowel regimen  Antiemetics and analgesics as needed  Encourage up to chair-PT on consult  DVT prophylaxis with Lovenox  GI prophylaxis with Protonix      My total face time with this patient was 25 minutes. Greater than half of the visit was spent in counseling the patient on the above listed diagnoses and treatment options.     Vera Crook PA-C  1/2/2024  11:40 AM

## 2024-01-02 NOTE — PLAN OF CARE
Patient A&Ox3, VSS on RA, , tele; Afib. Patient denies pain at this time. Dressing changed per orders, see flowsheets. Waffle cushion applied under buttocks. Patient tolerating fulls, denies nausea or upset stomach. Advanced to soft diet. Plan to DC home when tolerating soft diet. Plan of care reviewed with patient. Patient demonstrates understanding.    1430: patient tolerating soft diet, denies nausea or upset stomach. Received discharge orders   no

## 2024-01-03 NOTE — DISCHARGE SUMMARY
[facility]  DISCHARGE SUMMARY     Liang Archibald Patient Status:  Inpatient    3/4/1940 MRN [id number]   Location [facility] 3SW-A Attending No att. providers found   Hosp Day # 6 PCP Victorina Morris MD     Date of Admission: 2023  Date of Discharge: 2024    Discharge Disposition: Assisted Living    Admitting Diagnosis:   Dehydration [E86.0]  Small bowel obstruction (HCC) [K56.609]  Nausea and vomiting in adult [R11.2]  Leukocytosis, unspecified type [D72.829]    Hospital Discharge Diagnoses:  SBO, initially felt to be improving, but with nausea and vomiting , OBS with worsening SBO  NPO  NGT re-inserted , removed tolerating diet   IVF adjusted   PPI   Surgical consulted  GERD  PPI IV  CAD sp CABG  Essential hypertension  Dyslipidemia  Atrial fibrillation  TR severe  Metoprolol IV  PTA diuretics on hold  Monitor hemodynamics  Telemetry   Coagulopathy d/t Coumadin, given Vit K  and   Cont therapeutic Lovenox now that INR < 2  Monitor INR  Alzheimer's dementia  Hold Namenda, Aricept  Hypernatremia  IVF adjusted and improved   Cholecystitis? No abdominal pain, LFTs normal  Acute kidney injury, improved   MIKE  Leukocytosis, improving     Lace+ Score: 74  59-90 High Risk  29-58 Medium Risk  0-28   Low Risk.     Risk of readmission: Liang Archibald has High Risk of readmission after discharge from the hospital.    History of Present Illness: Liang Archibald is a 83 year old male with a past medical history of dementia, anxiety, CAD, GERD.  Patient lives in a nursing home.  He comes to the emergency room now secondary to episodes of vomiting.  Imaging showed signs of bowel obstruction.     Brief Synopsis: Patient diagnosed with bowel obstruction General surgery placed on consultation.  Patient was initially improving with NG decompression.  General surgery continue to follow.  Unfortunately patient was not improving with NG tube removed and had to be reinserted.   Patient eventually was improving and NG tube was removed and patient continued to improve and tolerating diet.  Patient was cleared by general surgery and discharged in stable condition.    Procedures during hospitalization:   None    Incidental or significant findings and recommendations (brief descriptions):  Per Brief Synopsis of Hospital Course    Lab/Test results pending at Discharge:   None    Consultants:  General surgery    Discharge Medication List:     Discharge Medications        CONTINUE taking these medications        Instructions Prescription details   acetaminophen 325 MG Tabs  Commonly known as: Tylenol      Take 2 tablets (650 mg total) by mouth every 6 (six) hours as needed for Pain.   Refills: 0     Aspirin Adult Low Strength 81 MG Tbec  Generic drug: aspirin      TAKE ONE TABLET BY MOUTH EVERY MORNING   Quantity: 31 tablet  Refills: PRN     calcium carbonate 500 MG Chew  Commonly known as: Tums      Chew 1 tablet (500 mg total) by mouth every 6 (six) hours as needed for Heartburn.   Refills: 0     cyanocobalamin 1000 MCG Tabs  Commonly known as: Vitamin B12      Take 1 tablet (1,000 mcg total) by mouth daily.   Refills: 0     docusate sodium 100 MG Caps  Commonly known as: Colace      Take 1 capsule (100 mg total) by mouth 2 (two) times daily as needed for constipation.   Refills: 0     donepezil 10 MG Tabs  Commonly known as: Aricept      Take 1 tablet (10 mg total) by mouth nightly. at bedtime   Quantity: 90 tablet  Refills: 3     escitalopram 10 MG Tabs  Commonly known as: Lexapro      Take 1 tablet (10 mg total) by mouth daily.   Quantity: 90 tablet  Refills: 3     furosemide 20 MG Tabs  Commonly known as: Lasix      Take 1 tablet (20 mg total) by mouth 2 (two) times daily.   Quantity: 60 tablet  Refills: 5     guaiFENesin 100 MG/5 ML  Commonly known as: Robitussin      Take 5 mL (100 mg total) by mouth every 4 (four) hours as needed (cough).   Refills: 0     guaiFENesin-dextromethorphan  100-10 MG/5ML Syrp  Commonly known as: Robitussin DM      Take 5 mL by mouth every 4 (four) hours as needed for cough.   Refills: 0     hydrocortisone 1 % Crea      Apply 1 Application topically as needed (to right shoulder with each wound care).   Refills: 0     loperamide 2 MG Caps  Commonly known as: Imodium      Take 1 capsule (2 mg total) by mouth 4 (four) times daily as needed for Diarrhea.   Refills: 0     loratadine 10 MG Tabs  Commonly known as: Claritin      Take 1 tablet (10 mg total) by mouth daily.   Refills: 0     LORazepam 0.5 MG Tabs  Commonly known as: Ativan      Take 1 tablet (0.5 mg total) by mouth daily as needed (30 minutes to 1 hour prior to shower).   Refills: 0     Magnesium Oxide -Mg Supplement 400 (240 Mg) MG Tabs      TAKE ONE TABLET BY MOUTH EVERY MORNING   Quantity: 31 tablet  Refills: PRN     memantine 10 MG Tabs  Commonly known as: Namenda      Take 1 tablet (10 mg total) by mouth 2 (two) times daily.   Quantity: 180 tablet  Refills: 3     metoprolol succinate ER 25 MG Tb24  Commonly known as: Toprol XL      Take 0.5 tablets (12.5 mg total) by mouth every other day.   Refills: 0     Multi-Vitamin/Minerals Tabs      Take 1 tablet by mouth daily.   Refills: 0     Nystatin [id number] UNIT/GM Powd      Apply 1 Application topically 2 (two) times daily as needed.   Refills: 0     pantoprazole 40 MG Tbec  Commonly known as: Protonix      TAKE 1 TABLET (40 MG TOTAL) BY MOUTH EVERY MORNING BEFORE BREAKFAST.   Quantity: 90 tablet  Refills: 1     triamcinolone 0.1 % Crea  Commonly known as: Kenalog      Apply 1 Application topically daily as needed (to R shoulder and L forearm).   Refills: 0     Vitamin D 1000 units Caps      Take 1 capsule by mouth daily.   Refills: 0     warfarin 5 MG Tabs  Commonly known as: Coumadin      Take 1 tablet (5 mg total) by mouth nightly.   Refills: 6              Follow-up appointment:   Sary Vera MD  [address] [facility]  [id number]  [phone]    Follow up  As needed    Victorina Morris MD  [address] [place] [id number]  [phone]    Schedule an appointment as soon as possible for a visit        -----------------------------------------------------------------------------------------------  PATIENT DISCHARGE INSTRUCTIONS: See electronic chart    Jamison Blackburn MD 1/3/2024    Time spent: 33 minutes

## 2024-05-06 NOTE — CONSULTS
[facility]  Report of  Surgical Consultation with History and Physical Exam    Liang Archibald Patient Status:  Inpatient    3/4/1940 MRN [id number]   Location [facility] 3SW-A Attending Miguel Nicole MD   Hosp Day # 0 PCP Victorina Morris MD     Reason for Surgical Consultation:  I am seeing this patient at the request of Dr. Jay for nausea, vomiting and abdominal pain.     History of Present Illness:  Laing Archibald is a a(n) 83 year old male who presented to the ER via EMS yesterday evening with nausea, vomiting and abdominal pain.  The patient does not have family at bedside.  He has Alzheimer's disease and is a poor historian.  He is not oriented to place or time.  He is oriented to person.  The only history he is able to provide is that he arrived here via ambulance.  The remainder of his history was retrieved via his EMR.    The patient arrived via EMS to the emergency department from a skilled nursing facility yesterday evening after nausea and vomiting at dinner.  He had had diarrhea earlier that day.    At time of today's exam, he denies nausea or vomiting.  He denies any abdominal pain.    CT scan of the abdomen and pelvis upon presentation to the emergency department revealed a small bowel obstruction measuring up to 5.2 cm with a transition point in the right abdomen in the region of enteric small bowel staples.  There is some free fluid in the right upper quadrant.  The gallbladder is also dilated with surrounding fat stranding.  Lastly a segment of colon in the right upper quadrant is narrowed and thickened.  This is adjacent to the gallbladder.    His vital signs are stable.  Leukocytosis with a white blood cell count of 19.  Hemoglobin 16.2.  BUN and creatinine.  27.2 respectively.  AST 22, ALT 15 and alkaline phosphatase 95, all within normal limits. Total bilirubin is 2.0.    The patient appears to have been hospitalized in 2017 with with a small bowel  obstruction.  He was seen by Dr. Alfaro at that time and treated conservatively with NG tube decompression.    The patient has a past medical history of dementia, coronary disease and GERD.    He is unaware of his surgical history, but he does have a very well-healed midline incision.              History:  Past Medical History:   Diagnosis Date    A-fib ([facility])     Alzheimer's dementia ([facility])     Anxiety 11/18/2013    Arrhythmia     afib after cabg, resolved now    ASHD (arteriosclerotic heart disease) 10/08/2012    Atherosclerosis of coronary artery     [place] 11/18/2013    Coronary atherosclerosis of unspecified type of vessel, native or graft 01/01/1994    S/P MI, angioplasty LAD    Esophageal reflux     Essential hypertension     Hearing impairment     Heart attack ([facility]) 01/01/1974    Hernia     will be having surgery in the future    High blood pressure     High cholesterol     Hyperlipidemia     Insomnia 11/18/2013    Myocardial infarction ([facility])     1974    Nausea and vomiting in adult 12/27/2023    Obstructive sleep apnea (adult) (pediatric) SPLIT NIGHT 5-1-16    AHI 6 SaO2 gary 92 % CPAP 8     SBO (small bowel obstruction) ([facility])     Unspecified sleep apnea     Visual impairment      Past Surgical History:   Procedure Laterality Date    ADENOIDECTOMY      palate surgery,deviated septum    ANGIOPLASTY (CORONARY)      one vessel    APPENDECTOMY      CABG  6/2014    4 bypass    CATH PERCUTANEOUS  TRANSLUMINAL CORONARY ANGIOPLASTY      COLECTOMY  12/2014    for bowel obstruction    COLON SURGERY      COLONOSCOPY,DIAGNOSTIC  5/23/03    normal    COLONOSCOPY,DIAGNOSTIC  3/16/13    descending polyp  at 40 cm    DRAIN/INJECT LARGE JOINT/BURSA  1/8/2014    Procedure: BURSA INJECTION;  Surgeon: Alejandro Mcmillan MD;  Location: [facility]    FLUOROSCOPIC GUIDANCE NEEDLE PLACEMENT  1/8/2014    Procedure: LUMBAR FACET INJECTION OR MEDIAL BRANCH NERVE BLOCK;  Surgeon: Alejandro Mcmillan MD;  Location: [facility]  [facility]    HERNIA SURGERY      INGUINAL HERNIA REPAIR Left 4/13/2015    Procedure: HERNIA INGUINAL REPAIR ADULT;  Surgeon: Raheem Goff MD;  Location: [facility] MAIN OR    INJ PARAVERT F JNT L/S 1 LEV Right 8/5/2015    Procedure: LUMBAR FACET INJECTION OR MEDIAL BRANCH NERVE BLOCK;  Surgeon: Diony Zuniga MD;  Location: [facility]    INJ PARAVERT F JNT L/S 1 LEV Right 1/14/2016    Procedure: LUMBAR FACET INJECTION OR MEDIAL BRANCH NERVE BLOCK;  Surgeon: Diony Zuniga MD;  Location: [facility]    INJ PARAVERT F JNT L/S 1 LEV Right 2/1/2016    Procedure: LUMBAR FACET INJECTION OR MEDIAL BRANCH NERVE BLOCK;  Surgeon: Diony Zuniga MD;  Location: [facility]    INJ PARAVERT F JNT L/S 2 LEV Right 8/5/2015    Procedure: LUMBAR FACET INJECTION OR MEDIAL BRANCH NERVE BLOCK;  Surgeon: Diony Zuniga MD;  Location: [facility]    INJ PARAVERT F JNT L/S 2 LEV Right 1/14/2016    Procedure: LUMBAR FACET INJECTION OR MEDIAL BRANCH NERVE BLOCK;  Surgeon: Diony Zuniga MD;  Location: [facility]    INJ PARAVERT F JNT L/S 2 LEV Right 2/1/2016    Procedure: LUMBAR FACET INJECTION OR MEDIAL BRANCH NERVE BLOCK;  Surgeon: Diony Zuniga MD;  Location: [facility]    INJ PARAVERT F JNT L/S 3 LEV Right 8/5/2015    Procedure: LUMBAR FACET INJECTION OR MEDIAL BRANCH NERVE BLOCK;  Surgeon: Diony Zuniga MD;  Location: [facility]    INJECTION, ANESTHETIC/STEROID, TRANSFORAMINAL EPIDURAL; LUMBAR/SACRAL, ADD'L LEVEL N/A 8/20/2015    Procedure: TRANSFORAMINAL EPIDURAL - LUMBAR;  Surgeon: Alejandro Mcmillan MD;  Location: [facility]    INJECTION, ANESTHETIC/STEROID, TRANSFORAMINAL EPIDURAL; LUMBAR/SACRAL, ADD'L LEVEL Right 9/4/2015    Procedure: TRANSFORAMINAL EPIDURAL - LUMBAR;  Surgeon: Alejandro Mcmillan MD;  Location: [facility]    INJECTION,  ANESTHETIC/STEROID, TRANSFORAMINAL EPIDURAL; LUMBAR/SACRAL, ADD'L LEVEL Right 3/2/2016    Procedure: TRANSFORAMINAL EPIDURAL - LUMBAR;  Surgeon: Diony Zuniga MD;  Location: [facility] [place] [facility]    INJECTION, ANESTHETIC/STEROID, TRANSFORAMINAL EPIDURAL; LUMBAR/SACRAL, ADD'L LEVEL Right 3/10/2016    Procedure: TRANSFORAMINAL EPIDURAL - LUMBAR;  Surgeon: Diony Zuniga MD;  Location: [facility]    INJECTION, ANESTHETIC/STEROID, TRANSFORAMINAL EPIDURAL; LUMBAR/SACRAL, ADD'L LEVEL Right 3/29/2016    Procedure: TRANSFORAMINAL EPIDURAL - LUMBAR;  Surgeon: Diony Zuniga MD;  Location: [facility]    INJECTION, ANESTHETIC/STEROID, TRANSFORAMINAL EPIDURAL; LUMBAR/SACRAL, SINGLE LEVEL N/A 8/20/2015    Procedure: TRANSFORAMINAL EPIDURAL - LUMBAR;  Surgeon: Alejandro Mcmillan MD;  Location: [facility]    INJECTION, ANESTHETIC/STEROID, TRANSFORAMINAL EPIDURAL; LUMBAR/SACRAL, SINGLE LEVEL Right 9/4/2015    Procedure: TRANSFORAMINAL EPIDURAL - LUMBAR;  Surgeon: Alejandro Mcmillan MD;  Location: [facility]    INJECTION, ANESTHETIC/STEROID, TRANSFORAMINAL EPIDURAL; LUMBAR/SACRAL, SINGLE LEVEL Right 3/2/2016    Procedure: TRANSFORAMINAL EPIDURAL - LUMBAR;  Surgeon: Diony Zuniga MD;  Location: [facility]    INJECTION, ANESTHETIC/STEROID, TRANSFORAMINAL EPIDURAL; LUMBAR/SACRAL, SINGLE LEVEL Right 3/10/2016    Procedure: TRANSFORAMINAL EPIDURAL - LUMBAR;  Surgeon: Diony Zuniga MD;  Location: [facility]    INJECTION, ANESTHETIC/STEROID, TRANSFORAMINAL EPIDURAL; LUMBAR/SACRAL, SINGLE LEVEL Right 3/29/2016    Procedure: TRANSFORAMINAL EPIDURAL - LUMBAR;  Surgeon: Diony Zuniga MD;  Location: [facility]    INJECTION, W/WO CONTRAST, DX/THERAPEUTIC SUBSTANCE, EPIDURAL/SUBARACHNOID; LUMBAR/SACRAL N/A 6/17/2015    Procedure: LUMBAR EPIDURAL;  Surgeon: Alejandro Mcmillan MD;  Location: [facility]  [place] [facility]    INJECTION, W/WO CONTRAST, DX/THERAPEUTIC SUBSTANCE, EPIDURAL/SUBARACHNOID; LUMBAR/SACRAL N/A 9/18/2015    Procedure: LUMBAR EPIDURAL;  Surgeon: Diony Zuniga MD;  Location: [facility]    INJECTION, W/WO CONTRAST, DX/THERAPEUTIC SUBSTANCE, EPIDURAL/SUBARACHNOID; LUMBAR/SACRAL N/A 10/2/2015    Procedure: LUMBAR EPIDURAL;  Surgeon: Diony Zuniag MD;  Location: [facility]    OPEN HEART SURGICAL PROFILE      OTHER SURGICAL HISTORY      Tonsillectomy uvelectomy     OTHER SURGICAL HISTORY  10/2015    surgery for bowel obstruction    PATIENT DOCUMENTED NOT TO HAVE EXPERIENCED ANY OF THE FOLLOWING EVENTS  1/8/2014    Procedure: LUMBAR FACET INJECTION OR MEDIAL BRANCH NERVE BLOCK;  Surgeon: Alejandro Mcmillan MD;  Location: [facility]    PATIENT DOCUMENTED NOT TO HAVE EXPERIENCED ANY OF THE FOLLOWING EVENTS N/A 6/17/2015    Procedure: LUMBAR EPIDURAL;  Surgeon: Alejandro Mcmillan MD;  Location: [facility]    PATIENT DOCUMENTED NOT TO HAVE EXPERIENCED ANY OF THE FOLLOWING EVENTS Right 8/5/2015    Procedure: LUMBAR FACET INJECTION OR MEDIAL BRANCH NERVE BLOCK;  Surgeon: Diony Zuniga MD;  Location: [facility]    PATIENT DOCUMENTED NOT TO HAVE EXPERIENCED ANY OF THE FOLLOWING EVENTS N/A 8/20/2015    Procedure: TRANSFORAMINAL EPIDURAL - LUMBAR;  Surgeon: Alejandro Mcmillan MD;  Location: [facility]    PATIENT DOCUMENTED NOT TO HAVE EXPERIENCED ANY OF THE FOLLOWING EVENTS Right 9/4/2015    Procedure: TRANSFORAMINAL EPIDURAL - LUMBAR;  Surgeon: Alejandro Mcmillan MD;  Location: [facility]    PATIENT DOCUMENTED NOT TO HAVE EXPERIENCED ANY OF THE FOLLOWING EVENTS N/A 9/18/2015    Procedure: LUMBAR EPIDURAL;  Surgeon: Diony Zuniga MD;  Location: [facility]    PATIENT DOCUMENTED NOT TO HAVE EXPERIENCED ANY OF THE FOLLOWING EVENTS N/A 10/2/2015     Procedure: LUMBAR EPIDURAL;  Surgeon: Diony Zuniga MD;  Location: [facility] [place] [facility]    PATIENT DOCUMENTED NOT TO HAVE EXPERIENCED ANY OF THE FOLLOWING EVENTS Right 1/14/2016    Procedure: LUMBAR FACET INJECTION OR MEDIAL BRANCH NERVE BLOCK;  Surgeon: Diony Zuniga MD;  Location: [facility] [place] [facility]    PATIENT DOCUMENTED NOT TO HAVE EXPERIENCED ANY OF THE FOLLOWING EVENTS Right 2/1/2016    Procedure: LUMBAR FACET INJECTION OR MEDIAL BRANCH NERVE BLOCK;  Surgeon: Diony Zuniga MD;  Location: [facility]    PATIENT DOCUMENTED NOT TO HAVE EXPERIENCED ANY OF THE FOLLOWING EVENTS Right 3/2/2016    Procedure: TRANSFORAMINAL EPIDURAL - LUMBAR;  Surgeon: Diony Zuniga MD;  Location: [facility]    PATIENT DOCUMENTED NOT TO HAVE EXPERIENCED ANY OF THE FOLLOWING EVENTS Right 3/10/2016    Procedure: TRANSFORAMINAL EPIDURAL - LUMBAR;  Surgeon: Diony Zuniga MD;  Location: [facility]    PATIENT DOCUMENTED NOT TO HAVE EXPERIENCED ANY OF THE FOLLOWING EVENTS Right 3/29/2016    Procedure: TRANSFORAMINAL EPIDURAL - LUMBAR;  Surgeon: Diony Zuniga MD;  Location: [facility]    PATIENT WITHOUGH PREOPERATIVE ORDER FOR IV ANTIBIOTIC SURGICAL SITE INFECTION PROPHYLAXIS.  1/8/2014    Procedure: LUMBAR FACET INJECTION OR MEDIAL BRANCH NERVE BLOCK;  Surgeon: Alejandro Mcmillan MD;  Location: [facility]    PATIENT WITHOUGH PREOPERATIVE ORDER FOR IV ANTIBIOTIC SURGICAL SITE INFECTION PROPHYLAXIS. N/A 6/17/2015    Procedure: LUMBAR EPIDURAL;  Surgeon: Alejandro Mcmillan MD;  Location: [facility]    PATIENT WITHOUGH PREOPERATIVE ORDER FOR IV ANTIBIOTIC SURGICAL SITE INFECTION PROPHYLAXIS. Right 8/5/2015    Procedure: LUMBAR FACET INJECTION OR MEDIAL BRANCH NERVE BLOCK;  Surgeon: Diony Zuniga MD;  Location: [facility]    PATIENT WITHOUGH PREOPERATIVE ORDER FOR IV  ANTIBIOTIC SURGICAL SITE INFECTION PROPHYLAXIS. N/A 8/20/2015    Procedure: TRANSFORAMINAL EPIDURAL - LUMBAR;  Surgeon: Alejandro Mcmillan MD;  Location: [facility]    PATIENT WITHOUGH PREOPERATIVE ORDER FOR IV ANTIBIOTIC SURGICAL SITE INFECTION PROPHYLAXIS. Right 9/4/2015    Procedure: TRANSFORAMINAL EPIDURAL - LUMBAR;  Surgeon: Alejandro Mcmillan MD;  Location: [facility]    PATIENT WITHOUGH PREOPERATIVE ORDER FOR IV ANTIBIOTIC SURGICAL SITE INFECTION PROPHYLAXIS. N/A 9/18/2015    Procedure: LUMBAR EPIDURAL;  Surgeon: Diony Zuniga MD;  Location: [facility]    PATIENT WITHOUGH PREOPERATIVE ORDER FOR IV ANTIBIOTIC SURGICAL SITE INFECTION PROPHYLAXIS. N/A 10/2/2015    Procedure: LUMBAR EPIDURAL;  Surgeon: Diony Zuniga MD;  Location: [facility]    PATIENT WITHOUGH PREOPERATIVE ORDER FOR IV ANTIBIOTIC SURGICAL SITE INFECTION PROPHYLAXIS. Right 1/14/2016    Procedure: LUMBAR FACET INJECTION OR MEDIAL BRANCH NERVE BLOCK;  Surgeon: Diony Zuniga MD;  Location: [facility]    PATIENT WITHOUGH PREOPERATIVE ORDER FOR IV ANTIBIOTIC SURGICAL SITE INFECTION PROPHYLAXIS. Right 2/1/2016    Procedure: LUMBAR FACET INJECTION OR MEDIAL BRANCH NERVE BLOCK;  Surgeon: Diony Zuniga MD;  Location: [facility]    PATIENT WITHOUGH PREOPERATIVE ORDER FOR IV ANTIBIOTIC SURGICAL SITE INFECTION PROPHYLAXIS. Right 3/2/2016    Procedure: TRANSFORAMINAL EPIDURAL - LUMBAR;  Surgeon: Diony Zuniga MD;  Location: [facility]    PATIENT WITHOUGH PREOPERATIVE ORDER FOR IV ANTIBIOTIC SURGICAL SITE INFECTION PROPHYLAXIS. Right 3/10/2016    Procedure: TRANSFORAMINAL EPIDURAL - LUMBAR;  Surgeon: Diony Zuniga MD;  Location: [facility]    PATIENT WITHOUGH PREOPERATIVE ORDER FOR IV ANTIBIOTIC SURGICAL SITE INFECTION PROPHYLAXIS. Right 3/29/2016    Procedure: TRANSFORAMINAL EPIDURAL -  LUMBAR;  Surgeon: Diony Zuniga MD;  Location: [facility]    SINUS SURGERY        deviated septum    SPECIAL SERVICE OR REPORT  2002    S/P uvulopalatoplasty, sinus surgery, septoplasty     Family History   Problem Relation Age of Onset    Heart Disorder Sister       reports that he has never smoked. He has never used smokeless tobacco. He reports current alcohol use. He reports that he does not use drugs.    Allergies:  No Known Allergies    Medications:    Current Facility-Administered Medications:     pantoprazole (Protonix) 40 mg in sodium chloride 0.9% PF 10 mL IV push, 40 mg, Intravenous, Q12H    dextrose in lactated ringers 5% infusion, , Intravenous, Continuous    acetaminophen (Tylenol Extra Strength) tab 1,000 mg, 1,000 mg, Oral, Q4H PRN    melatonin tab 3 mg, 3 mg, Oral, Nightly PRN    glycerin-hypromellose- (Artifical Tears) 0.2-0.2-1 % ophthalmic solution 1 drop, 1 drop, Both Eyes, QID PRN    sodium chloride (Saline Mist) 0.65 % nasal solution 1 spray, 1 spray, Each Nare, Q3H PRN    ondansetron (Zofran) 4 MG/2ML injection 4 mg, 4 mg, Intravenous, Q6H PRN    metoclopramide (Reglan) 5 mg/mL injection 10 mg, 10 mg, Intravenous, Q8H PRN    metoprolol (Lopressor) 5 mg/5mL injection 2.5 mg, 2.5 mg, Intravenous, Q6H    sodium chloride 0.9% infusion, 125 mL/hr, Intravenous, Continuous    sodium chloride 0.9% infusion, 125 mL/hr, Intravenous, Continuous    Review of Systems:  Pertinent items are noted in HPI.  Review of systems not obtained due to patient factors.      Physical Exam:  Blood pressure 108/50, pulse 55, temperature 97.6 °F (36.4 °C), temperature source Oral, resp. rate 16, weight 138 lb (62.6 kg), SpO2 95%.    General: Oriented to person.  Cooperative.  No apparent distress.    HEENT: Exam is unremarkable.  Without scleral icterus.  Mucous membranes are moist. EOM are WNL. NG tube in place.     Neck: No tenderness to palpitation.  Full range of motion to flexion and  Yes extension, lateral rotation and lateral flexion of cervical spine.  No JVD. Supple.     Lungs: No secondary use of accessory respiratory musculature.    Abdomen: Soft, distended with tympany to percussion.  Nontender to palpation.  No rebound or guarding.  Very well-healed midline incision.    Extremities:  No lower extremity edema noted.  Without clubbing or cyanosis.      Skin: Normal texture and turgor.      Laboratory Data and Relevant X-rays:  Recent Labs   Lab 12/26/23 2123   RBC 4.91   HGB 16.2   HCT 46.8   MCV 95.3   MCH 33.0   MCHC 34.6   RDW 13.2   NEPRELIM 16.65*   WBC 19.0*   .0       Recent Labs   Lab 12/26/23 2123   *   BUN 27*   CREATSERUM 1.92*   CA 9.4   ALB 3.3*      K 3.5      CO2 25.0   ALKPHO 95   AST 22   ALT 15*   BILT 2.0   TP 7.8         Recent Labs   Lab 12/26/23 2123 12/27/23  0443   PTP 34.0* 39.5*   INR 3.29* 3.98*       Imaging    Narrative  PROCEDURE:  CT ABDOMEN PELVIS IV CONTRAST, NO ORAL (ER)     COMPARISON:  [facility] , CT, CT ABDOMEN+PELVIS(CPT=74176), 10/29/2017, 8:04 PM.     INDICATIONS:  n/v nausea vomiting     TECHNIQUE:  CT scanning was performed from the dome of the diaphragm to the pubic symphysis with non-ionic intravenous contrast material. Post contrast coronal MPR imaging was performed.  Dose reduction techniques were used. Dose information is  transmitted to the ACR (American College of Radiology) NRDR (National Radiology Data Registry) which includes the Dose Index Registry.     PATIENT STATED HISTORY:(As transcribed by Technologist)  n/v      CONTRAST USED:  80cc of Isovue 370     FINDINGS:          LIVER:  Unremarkable.     BILIARY:  Gallbladder is dilated 5.8 cm transverse dimension.  Fluid present around the gallbladder and small amount of fluid in Szymanski's pouch.  No gas bubbles in the gallbladder lumen or wall or visible stone.  The common bile duct does not appear  dilated.  There may be a tiny duodenal diverticulum..     PANCREAS:   Unremarkable.     SPLEEN:  Unremarkable.     KIDNEYS:  No acute abnormality.  Tiny probable cysts     ADRENALS:  Unremarkable.     AORTA/VASCULAR:  No aortic aneurysm. There are atherosclerotic changes including calcification involving the aorta, but no evidence for aneurysm involving the aorta.     RETROPERITONEUM:  Unremarkable.     BOWEL/MESENTERY:  Dilated air and fluid-filled loops of small bowel maximum dimension 5.3 cm in the left upper quadrant.  Small bowel dilation appears to have a transition in the medial right mid abdomen in a region of enteric staples, and thereafter the   small bowel appears decompressed to the level of the ileocecal valve.  The colon contains liquid stool and air, all the way to the rectum, the rectum is dilated with mostly liquid stool 6.9 cm transverse dimension.  A particularly narrowed segment of  colon measuring about 8 cm in length is present in the right upper quadrant, appearing to involve the upper ascending colon and splenic flexure.  The colon enhances here, and is surrounded by tissue thickening and stranding and some fluid.  The adjacent  gallbladder is dilated.  There is no large or drainable ascites, and no fluid in the pelvis or lower abdomen, but around the liver there is a rim of fluid measuring 2.2 cm. No free air seen.     ABDOMINAL WALL:  Unremarkable.     URINARY BLADDER:  Unremarkable.     LYMPH NODES PELVIS:  Unremarkable.     PELVIC ORGANS:  No acute process.     LUNG BASES:  Trace right pleural effusion and bilateral patchy lower lobe opacities likely atelectasis.  Some areas of scarring in the middle lobe and lingula.     BONES:  No acute abnormality. Note is made of degenerative changes present in the spine.                        Impression  CONCLUSION:       1. Small-bowel obstruction, with dilated small bowel measuring up to 5.2 cm, with transition point in the right abdomen in a region of enteric small bowel staples.  Suspect adhesions/stricture,  possibly related to the surgery.  The small bowel distal to  this is not dilated.     2. No large or drainable ascites, but there is free fluid in the right upper quadrant around the liver.     3. In addition, the gallbladder is dilated and there is stranding of the fat around this.  Correlate for any signs of acute cholecystitis.     4. A segment of colon in the right upper quadrant is narrowed, thickened and enhancing, as it traverses through a zone of inflammatory appearing stranding and fluid, adjacent to the above-described gallbladder.  Whether this is secondary to gallbladder  inflammation secondarily affecting the colon or a primary process of the colon is indeterminate.  Suggest CT scan follow-up to see if this areas persist, and if so consider colonoscopy to exclude lesions of the colon in this area.  Otherwise, the colon  contains liquid stool from the cecum to the rectum.     5. No free air.  Trace right pleural effusion and atelectasis.  Other findings as above.          LOCATION:  [facility]        Dictated by (CST): Hao Quiroz MD on 12/26/2023 at 10:54 PM      Finalized by (CST): Hao Quiroz MD on 12/26/2023 at 11:04 PM      Impression:  Patient Active Problem List   Diagnosis    ASHD (arteriosclerotic heart disease)    HNP (herniated nucleus pulposus), lumbar    Lumbar spondylosis    Myofascial pain syndrome    Atrial fibrillation and flutter (HCC)    Spondylolisthesis of lumbar region    MIKE on CPAP    Cerebral microvascular disease    Cognitive complaints    Acute on chronic congestive heart failure (HCC)    Acute on chronic congestive heart failure, unspecified heart failure type (HCC)    Fall, initial encounter    Hypomagnesemia    Alzheimer's disease of other onset without behavioral disturbance    Weight loss, non-intentional    Hypokalemia    Leukocytosis    Hyperglycemia    Small bowel obstruction (HCC)    ACP (advance care planning)    Nausea and vomiting in adult    Dehydration     Leukocytosis, unspecified type       83-year-old gentleman who was admitted through the emergency department with complaints of abdominal pain, nausea and vomiting.  The patient does have a history of dementia and Alzheimer's.  There is no family at the bedside therefore history is obtained from records.  Per the RN he is oriented to person only.  The patient was transported by ambulance from nursing home shortly after episode of nausea and vomiting after dinner.  Per records he did have a loose bowel movement earlier that day.    Currently, the patient is laying in bed with a bear his hat pulled over his eyes.  He is arousable.  He denies abdominal pain when questioned.    CT scan reveals changes consistent with small bowel obstruction with transition point in the right abdomen and an area of a small bowel anastomosis and staple line.  Gallbladder is dilated with fat stranding although it is unclear whether this is a primary finding related to the gallbladder or secondary finding related to the colon.  Segment of the ascending and hepatic flexure of the colon is noted to be narrowed and thickened.  Serology in ED revealed leukocytosis of 19.    Past medical-surgical history prior history of small bowel resection as small bowel anastomosis is noted on CT.  Further details are unknown.  Patient was hospitalized in 2017 for SBO which was treated nonoperatively.  At that time he was seen by Dr. [place].  Patient does have a history of A-fib and is chronically anticoagulated, history of Alzheimer's, history of CABG in 2014, status post MI and angioplasty 1994, status post MI in 1974, sleep apnea using CPAP, SBO, TNA, appendectomy, extensive nerve blocks    Examination-abdomen is soft, minimally distended, no tenderness elicited to palpation in the bilateral upper and left lower quadrant.  Mild tenderness to deep palpation in the right lower quadrant.  No guarding or rebound is noted.  Well-healed midline incision.  No  evidence of hernia.    No indication for emergent surgical intervention at this time.  Will continue medical management with NG tube decompression, bowel rest, IV hydration, DVT and GI prophylaxis, serial abdominal exams.  Repeat serology today.  If medical management unsuccessful, would recommend surgical intervention, laparoscopy-laparotomy, lysis of adhesions, possible bowel resection.  Additionally, will discuss with patient's medical POA regarding further workup of area of narrowing of the colon near the hepatic flexure.      Plan:  N.p.o.  IV fluids, DVT, GI prophylaxis  NG tube to low continuous suction  Serial abdominal exam    BRITTANY Vera MD, FACS      Please note that this report has been produced using speech recognition software and may contain errors related to that system including but not limited to errors in grammar, punctuation and spelling as well as words and phrases that possibly may have been recognized inappropriately.  If there are any questions or concerns please contact the dictating provider for clarification.  The 21st Century Cures Act makes medical notes like these available to patients in the interest of trans parency. Please be advised this is a medical document. Medical documents are intended to carry relevant information, facts as evident, and the clinical opinion of the practitioner. The medical note is intended as peer to peer communication and may appear blunt or direct. It is written in medical language and may contain abbreviations or verbiage that are unfamiliar.  If there are any questions or concerns please contact the dictating provider for clarification.      Vera Crook PA-C  12/27/2023  9:26 AM    Is this a shared or split note between Advanced Practice Provider and Physician? Yes       3 (mild pain)

## (undated) NOTE — ED AVS SNAPSHOT
[facility] Emergency Department    [place]  [address] [address]    Phone:  [phone]    Fax:  [address]   MRN: [id number]    Department:  [facility] Emergency Department   Date of Visit:  4 IF THERE IS ANY CHANGE OR WORSENING OF YOUR CONDITION, CALL YOUR PRIMARY CARE PHYSICIAN AT ONCE OR RETURN IMMEDIATELY TO THE EMERGENCY DEPARTMENT.     If you have been prescribed any medication(s), please fill your prescription right away and begin taking t

## (undated) NOTE — IP AVS SNAPSHOT
Patient Demographics     Address  [address]  [facility] [address] Phone  [phone] (Home) *Preferred*  [phone] (Mobile) E-mail Address  [email or web address]      Patient Contacts     Name Relation Home Work Mobile    Liang Archibald  [phone]    Suzanne Moreno [phone]      Allergies as of 1/2/2024  Review status set to In Progress on 12/26/2023   No Known Allergies     Code Status Information     Code Status    DNAR/Selective Treatment      Patient Instructions    None      Follow-up Information     Sary Vera MD Follow up.    Specialty: SURGERY, GENERAL  Why: As needed  Contact information:  [address]  [facility] [address] [phone]             Victorina Morris MD. Schedule an appointment as soon as possible for a visit.    Specialties: Family Medicine, IP Consult to Primary Care  Contact information:  [address]  [facility] [address] [phone]                        Your Home Meds List      TAKE these medications       Instructions Authorizing Provider Morning Afternoon Evening As Needed   acetaminophen 325 MG Tabs  Commonly known as: Tylenol      Take 2 tablets (650 mg total) by mouth every 6 (six) hours as needed for Pain.          Aspirin Adult Low Strength 81 MG Tbec  Generic drug: aspirin      TAKE ONE TABLET BY MOUTH EVERY MORNING   Raji Vercelli         calcium carbonate 500 MG Chew  Commonly known as: Tums      Chew 1 tablet (500 mg total) by mouth every 6 (six) hours as needed for Heartburn.          cyanocobalamin 1000 MCG Tabs  Commonly known as: Vitamin B12      Take 1 tablet (1,000 mcg total) by mouth daily.          docusate sodium 100 MG Caps  Commonly known as: Colace      Take 1 capsule (100 mg total) by mouth 2 (two) times daily as needed for constipation.          donepezil 10 MG Tabs  Commonly known as: Aricept      Take 1 tablet (10 mg total) by mouth nightly. at bedtime    Kmoal PAL Burton         escitalopram 10 MG Tabs  Commonly known as: Lexapro      Take 1 tablet (10 mg total) by mouth daily.   Palomo Duarte         furosemide 20 MG Tabs  Commonly known as: Lasix      Take 1 tablet (20 mg total) by mouth 2 (two) times daily.   Duy Cha         guaiFENesin 100 MG/5 ML  Commonly known as: Robitussin      Take 5 mL (100 mg total) by mouth every 4 (four) hours as needed (cough).          guaiFENesin-dextromethorphan 100-10 MG/5ML Syrp  Commonly known as: Robitussin DM      Take 5 mL by mouth every 4 (four) hours as needed for cough.          hydrocortisone 1 % Crea      Apply 1 Application topically as needed (to right shoulder with each wound care).          loperamide 2 MG Caps  Commonly known as: Imodium      Take 1 capsule (2 mg total) by mouth 4 (four) times daily as needed for Diarrhea.          loratadine 10 MG Tabs  Commonly known as: Claritin      Take 1 tablet (10 mg total) by mouth daily.          LORazepam 0.5 MG Tabs  Commonly known as: Ativan      Take 1 tablet (0.5 mg total) by mouth daily as needed (30 minutes to 1 hour prior to shower).          Magnesium Oxide -Mg Supplement 400 (240 Mg) MG Tabs      TAKE ONE TABLET BY MOUTH EVERY MORNING   Raji Claudio         memantine 10 MG Tabs  Commonly known as: Namenda      Take 1 tablet (10 mg total) by mouth 2 (two) times daily.   Komal PAL Burton         metoprolol succinate ER 25 MG Tb24  Commonly known as: Toprol XL  Next dose due: Take thursday      Take 0.5 tablets (12.5 mg total) by mouth every other day.          Multi-Vitamin/Minerals Tabs      Take 1 tablet by mouth daily.          Nystatin [id number] UNIT/GM Powd      Apply 1 Application topically 2 (two) times daily as needed.          pantoprazole 40 MG Tbec  Commonly known as: Protonix      TAKE 1 TABLET (40 MG TOTAL) BY MOUTH EVERY MORNING BEFORE BREAKFAST.   Raji Vercellraghav         triamcinolone 0.1 % Crea  Commonly known as: Kenalog      Apply 1 Application  topically daily as needed (to R shoulder and L forearm).          Vitamin D 1000 units Caps      Take 1 capsule by mouth daily.          warfarin 5 MG Tabs  Commonly known as: Coumadin  Next dose due: tonight      Take 1 tablet (5 mg total) by mouth nightly.                   [phone] - MAR ACTION REPORT  (last 48 hrs)    ** SITE UNKNOWN **     Order ID Medication Name Action Time Action Reason Comments    [id number] docusate sodium (Colace) cap 100 mg 01/02/24 1352 Given      [id number] metoprolol (Lopressor) 5 mg/5mL injection 2.5 mg 12/31/23 2311 Given      [id number] metoprolol (Lopressor) 5 mg/5mL injection 2.5 mg 01/02/24 1352 Given      [id number] pantoprazole (Protonix) 40 mg in sodium chloride 0.9% PF 10 mL IV push 01/01/24 0933 Given      [id number] pantoprazole (Protonix) 40 mg in sodium chloride 0.9% PF 10 mL IV push 01/02/24 0924 Given      [id number] polyethylene glycol (PEG 3350) (Miralax) 17 g oral packet 17 g 01/01/24 1639 Given      [id number] polyethylene glycol (PEG 3350) (Miralax) 17 g oral packet 17 g 01/02/24 0924 Given      [id number] potassium chloride 20 mEq in dextrose 5%-sodium chloride 0.45% 1000mL infusion premix 01/01/24 1814 New Bag      [id number] potassium chloride 40 mEq in 250mL sodium chloride 0.9% IVPB premix 01/01/24 1048 New Bag            LEFT LOWER ABDOMEN     Order ID Medication Name Action Time Action Reason Comments    [id number] enoxaparin (Lovenox) 60 MG/0.6ML SUBQ injection 60 mg 12/31/23 2115 Given      [id number] enoxaparin (Lovenox) 60 MG/0.6ML SUBQ injection 60 mg 01/01/24 0933 Given      [id number] enoxaparin (Lovenox) 60 MG/0.6ML SUBQ injection 60 mg 01/01/24 2053 Given      [id number] enoxaparin (Lovenox) 60 MG/0.6ML SUBQ injection 60 mg 01/02/24 0924 Given              Recent Vital Signs    Flowsheet Row Most Recent Value   /81 Filed at 01/02/2024 1300   Pulse 80 Filed at 01/02/2024 1300   Resp 19 Filed at 01/02/2024 1300   Temp 97.8 °F (36.6 °C) Filed at 01/02/2024  1300   SpO2 93 % Filed at 01/02/2024 1300      Patient's Most Recent Weight    Flowsheet Row Most Recent Value   Patient Weight 62.6 kg (138 lb)         Lab Results Last 24 Hours      Prothrombin Time (PT) [[id number]] (Abnormal)  Resulted: 01/02/24 0824, Result status: Final result   Ordering provider: Miguel Nicole MD  01/01/24 2300 Resulting lab: [facility] ([facility])    Specimen Information    Type Source Collected On   Blood — 01/02/24 0611          Components    Component Value Reference Range Flag Lab   PT 23.0 11.6 - 14.8 seconds [facility] ([facility])   Comment:        Elevations of the PT and/or INR in patients not   receiving anticoagulant therapy may be seen in   factor deficiency, vitamin K deficiency, liver   disease, etc. Clinical correlation is recommended.       INR 2.01 0.80 - 1.20 H [facility] ([facility])   Comment:        Only the INR (not the PT value) should be utilized for   the monitoring of oral anticoagulant therapy.     Recommended therapeutic ranges for anticoagulant therapy are   as follows:   2.0 - 3.0 All indications except for mechanical prosthetic   cardiac valves.     2.5 - 3.5 Mechanical prosthetic cardiac valves.                Potassium [[id number]] (Normal)  Resulted: 01/02/24 0654, Result status: Final result   Ordering provider: Jamison Blackburn MD  01/01/24 2300 Resulting lab: [facility] ([facility])    Specimen Information    Type Source Collected On   Blood — 01/02/24 0611          Components    Component Value Reference Range Flag Lab   Potassium 3.8 3.5 - 5.1 mmol/L — [facility])            Testing Performed By     Lab - Abbreviation Name Director Address Valid Date Range    139 - [facility] ([facility]) [facility] ([facility]) Goldberg, Cathryn A. MD [address] [place] [id number] 03/19/20 1441 - Present            Microbiology Results (All)     None         H&P - H&P Note      H&P signed by Robert Estrada,  MD at 2023  2:11 AM  Version 2 of 2    Author: Robert Estrada MD Service: — Author Type: Physician    Filed: 2023  2:11 AM Date of Service: 2023  1:53 AM Status: Addendum    : Robert Estrada MD (Physician)    Related Notes: Original Note by Robert Estrada MD (Physician) filed at 2023  2:09 AM         [facility]     Liang Archibald Patient Status:  Emergency    3/4/1940 MRN [id number]   Location [facility] EMERGENCY DEPARTMENT Attending No att. providers found   Hosp Day # 0 PCP Victorina Morris MD     Chief Complaint:   Chief Complaint   Patient presents with    Nausea/Vomiting/Diarrhea       Subjective:    History of Present Illness:     Liang Archibald is a 83 year old male with a past medical history of dementia, anxiety, CAD, GERD.  Patient lives in a nursing home.  He comes to the emergency room now secondary to episodes of vomiting.  Imaging showed signs of bowel obstruction.    History/Other:    Past Medical History:  Past Medical History:   Diagnosis Date    Anxiety 2013    Arrhythmia     afib after cabg, resolved now    ASHD (arteriosclerotic heart disease) 10/8/2012    Atherosclerosis of coronary artery     [place] 2013    Coronary atherosclerosis of unspecified type of vessel, native or graft     S/P MI, angioplasty LAD    Esophageal reflux     Essential hypertension     Hearing impairment     Heart attack (HCC)     Hernia     will be having surgery in the future    High blood pressure     High cholesterol     Hyperlipidemia     Insomnia 2013    Myocardial infarction (HCC)         Obstructive sleep apnea (adult) (pediatric) SPLIT NIGHT 5-1-16    AHI 6 SaO2 gary 92 % CPAP 8     Unspecified sleep apnea     Visual impairment      Past Surgical History:   Past Surgical History:   Procedure Laterality Date    ADENOIDECTOMY      palate surgery,deviated septum    ANGIOPLASTY (CORONARY)      one vessel     APPENDECTOMY      CABG  6/2014    4 bypass    CATH PERCUTANEOUS  TRANSLUMINAL CORONARY ANGIOPLASTY      COLECTOMY  12/2014    for bowel obstruction    COLON SURGERY      COLONOSCOPY,DIAGNOSTIC  5/23/03    normal    COLONOSCOPY,DIAGNOSTIC  3/16/13    descending polyp  at 40 cm    DRAIN/INJECT LARGE JOINT/BURSA  1/8/2014    Procedure: BURSA INJECTION;  Surgeon: Alejandro Mcmillan MD;  Location: [facility]    FLUOROSCOPIC GUIDANCE NEEDLE PLACEMENT  1/8/2014    Procedure: LUMBAR FACET INJECTION OR MEDIAL BRANCH NERVE BLOCK;  Surgeon: Alejandro Mcmillan MD;  Location: [facility]    HERNIA SURGERY      INGUINAL HERNIA REPAIR Left 4/13/2015    Procedure: HERNIA INGUINAL REPAIR ADULT;  Surgeon: Raheem Goff MD;  Location: [facility] MAIN OR    INJ PARAVERT F JNT L/S 1 LEV Right 8/5/2015    Procedure: LUMBAR FACET INJECTION OR MEDIAL BRANCH NERVE BLOCK;  Surgeon: Diony Zuniga MD;  Location: [facility]    INJ PARAVERT F JNT L/S 1 LEV Right 1/14/2016    Procedure: LUMBAR FACET INJECTION OR MEDIAL BRANCH NERVE BLOCK;  Surgeon: Diony Zuniga MD;  Location: [facility]    INJ PARAVERT F JNT L/S 1 LEV Right 2/1/2016    Procedure: LUMBAR FACET INJECTION OR MEDIAL BRANCH NERVE BLOCK;  Surgeon: Diony Zuniga MD;  Location: [facility]    INJ PARAVERT F JNT L/S 2 LEV Right 8/5/2015    Procedure: LUMBAR FACET INJECTION OR MEDIAL BRANCH NERVE BLOCK;  Surgeon: Diony Zuniga MD;  Location: [facility]    INJ PARAVERT F JNT L/S 2 LEV Right 1/14/2016    Procedure: LUMBAR FACET INJECTION OR MEDIAL BRANCH NERVE BLOCK;  Surgeon: Diony Zuniga MD;  Location: [facility]    INJ PARAVERT F JNT L/S 2 LEV Right 2/1/2016    Procedure: LUMBAR FACET INJECTION OR MEDIAL BRANCH NERVE BLOCK;  Surgeon: Diony Zuniga MD;  Location: [facility]    INJ PARAVERT F JNT L/S 3 LEV Right 8/5/2015     Procedure: LUMBAR FACET INJECTION OR MEDIAL BRANCH NERVE BLOCK;  Surgeon: Diony Zuniga MD;  Location: [facility]    INJECTION, ANESTHETIC/STEROID, TRANSFORAMINAL EPIDURAL; LUMBAR/SACRAL, ADD'L LEVEL N/A 8/20/2015    Procedure: TRANSFORAMINAL EPIDURAL - LUMBAR;  Surgeon: Alejandro Mcmillan MD;  Location: [facility]    INJECTION, ANESTHETIC/STEROID, TRANSFORAMINAL EPIDURAL; LUMBAR/SACRAL, ADD'L LEVEL Right 9/4/2015    Procedure: TRANSFORAMINAL EPIDURAL - LUMBAR;  Surgeon: Alejandro Mcmillan MD;  Location: [facility]    INJECTION, ANESTHETIC/STEROID, TRANSFORAMINAL EPIDURAL; LUMBAR/SACRAL, ADD'L LEVEL Right 3/2/2016    Procedure: TRANSFORAMINAL EPIDURAL - LUMBAR;  Surgeon: Diony Zuniga MD;  Location: [facility]    INJECTION, ANESTHETIC/STEROID, TRANSFORAMINAL EPIDURAL; LUMBAR/SACRAL, ADD'L LEVEL Right 3/10/2016    Procedure: TRANSFORAMINAL EPIDURAL - LUMBAR;  Surgeon: Diony Zuniga MD;  Location: [facility]    INJECTION, ANESTHETIC/STEROID, TRANSFORAMINAL EPIDURAL; LUMBAR/SACRAL, ADD'L LEVEL Right 3/29/2016    Procedure: TRANSFORAMINAL EPIDURAL - LUMBAR;  Surgeon: Diony Zuniga MD;  Location: [facility]    INJECTION, ANESTHETIC/STEROID, TRANSFORAMINAL EPIDURAL; LUMBAR/SACRAL, SINGLE LEVEL N/A 8/20/2015    Procedure: TRANSFORAMINAL EPIDURAL - LUMBAR;  Surgeon: Alejandro Mcmillan MD;  Location: [facility]    INJECTION, ANESTHETIC/STEROID, TRANSFORAMINAL EPIDURAL; LUMBAR/SACRAL, SINGLE LEVEL Right 9/4/2015    Procedure: TRANSFORAMINAL EPIDURAL - LUMBAR;  Surgeon: Alejandro Mcmillan MD;  Location: [facility]    INJECTION, ANESTHETIC/STEROID, TRANSFORAMINAL EPIDURAL; LUMBAR/SACRAL, SINGLE LEVEL Right 3/2/2016    Procedure: TRANSFORAMINAL EPIDURAL - LUMBAR;  Surgeon: Diony Zuniga MD;  Location: [facility]    INJECTION, ANESTHETIC/STEROID,  TRANSFORAMINAL EPIDURAL; LUMBAR/SACRAL, SINGLE LEVEL Right 3/10/2016    Procedure: TRANSFORAMINAL EPIDURAL - LUMBAR;  Surgeon: Diony Zuniga MD;  Location: [facility]    INJECTION, ANESTHETIC/STEROID, TRANSFORAMINAL EPIDURAL; LUMBAR/SACRAL, SINGLE LEVEL Right 3/29/2016    Procedure: TRANSFORAMINAL EPIDURAL - LUMBAR;  Surgeon: Diony Zuniga MD;  Location: [facility]    INJECTION, W/WO CONTRAST, DX/THERAPEUTIC SUBSTANCE, EPIDURAL/SUBARACHNOID; LUMBAR/SACRAL N/A 6/17/2015    Procedure: LUMBAR EPIDURAL;  Surgeon: Alejandro Mcmillan MD;  Location: [facility]    INJECTION, W/WO CONTRAST, DX/THERAPEUTIC SUBSTANCE, EPIDURAL/SUBARACHNOID; LUMBAR/SACRAL N/A 9/18/2015    Procedure: LUMBAR EPIDURAL;  Surgeon: Diony Zuniga MD;  Location: [facility]    INJECTION, W/WO CONTRAST, DX/THERAPEUTIC SUBSTANCE, EPIDURAL/SUBARACHNOID; LUMBAR/SACRAL N/A 10/2/2015    Procedure: LUMBAR EPIDURAL;  Surgeon: Diony Zuniga MD;  Location: [facility]    OPEN HEART SURGICAL PROFILE      OTHER SURGICAL HISTORY      Tonsillectomy uvelectomy     OTHER SURGICAL HISTORY  10/2015    surgery for bowel obstruction    PATIENT DOCUMENTED NOT TO HAVE EXPERIENCED ANY OF THE FOLLOWING EVENTS  1/8/2014    Procedure: LUMBAR FACET INJECTION OR MEDIAL BRANCH NERVE BLOCK;  Surgeon: Alejandro Mcmillan MD;  Location: [facility]    PATIENT DOCUMENTED NOT TO HAVE EXPERIENCED ANY OF THE FOLLOWING EVENTS N/A 6/17/2015    Procedure: LUMBAR EPIDURAL;  Surgeon: Alejandro Mcmillan MD;  Location: [facility]    PATIENT DOCUMENTED NOT TO HAVE EXPERIENCED ANY OF THE FOLLOWING EVENTS Right 8/5/2015    Procedure: LUMBAR FACET INJECTION OR MEDIAL BRANCH NERVE BLOCK;  Surgeon: Diony Zuniga MD;  Location: [facility]    PATIENT DOCUMENTED NOT TO HAVE EXPERIENCED ANY OF THE FOLLOWING EVENTS N/A 8/20/2015    Procedure:  TRANSFORAMINAL EPIDURAL - LUMBAR;  Surgeon: Alejandro Mcmillan MD;  Location: [facility]    PATIENT DOCUMENTED NOT TO HAVE EXPERIENCED ANY OF THE FOLLOWING EVENTS Right 9/4/2015    Procedure: TRANSFORAMINAL EPIDURAL - LUMBAR;  Surgeon: Alejandro Mcmillan MD;  Location: [facility]    PATIENT DOCUMENTED NOT TO HAVE EXPERIENCED ANY OF THE FOLLOWING EVENTS N/A 9/18/2015    Procedure: LUMBAR EPIDURAL;  Surgeon: Diony Zuniga MD;  Location: [facility]    PATIENT DOCUMENTED NOT TO HAVE EXPERIENCED ANY OF THE FOLLOWING EVENTS N/A 10/2/2015    Procedure: LUMBAR EPIDURAL;  Surgeon: Diony Zuniga MD;  Location: [facility]    PATIENT DOCUMENTED NOT TO HAVE EXPERIENCED ANY OF THE FOLLOWING EVENTS Right 1/14/2016    Procedure: LUMBAR FACET INJECTION OR MEDIAL BRANCH NERVE BLOCK;  Surgeon: Diony Zuniga MD;  Location: [facility]    PATIENT DOCUMENTED NOT TO HAVE EXPERIENCED ANY OF THE FOLLOWING EVENTS Right 2/1/2016    Procedure: LUMBAR FACET INJECTION OR MEDIAL BRANCH NERVE BLOCK;  Surgeon: Diony Zuniga MD;  Location: [facility]    PATIENT DOCUMENTED NOT TO HAVE EXPERIENCED ANY OF THE FOLLOWING EVENTS Right 3/2/2016    Procedure: TRANSFORAMINAL EPIDURAL - LUMBAR;  Surgeon: Diony Zuniga MD;  Location: [facility]    PATIENT DOCUMENTED NOT TO HAVE EXPERIENCED ANY OF THE FOLLOWING EVENTS Right 3/10/2016    Procedure: TRANSFORAMINAL EPIDURAL - LUMBAR;  Surgeon: Diony Zuniga MD;  Location: [facility]    PATIENT DOCUMENTED NOT TO HAVE EXPERIENCED ANY OF THE FOLLOWING EVENTS Right 3/29/2016    Procedure: TRANSFORAMINAL EPIDURAL - LUMBAR;  Surgeon: Diony Zuniga MD;  Location: [facility]    PATIENT WITHOUGH PREOPERATIVE ORDER FOR IV ANTIBIOTIC SURGICAL SITE INFECTION PROPHYLAXIS.  1/8/2014    Procedure: LUMBAR FACET INJECTION OR MEDIAL BRANCH  NERVE BLOCK;  Surgeon: Alejandro Mcmillan MD;  Location: [facility]    PATIENT WITHOUGH PREOPERATIVE ORDER FOR IV ANTIBIOTIC SURGICAL SITE INFECTION PROPHYLAXIS. N/A 6/17/2015    Procedure: LUMBAR EPIDURAL;  Surgeon: Alejandro Mcmillan MD;  Location: [facility]    PATIENT WITHOUGH PREOPERATIVE ORDER FOR IV ANTIBIOTIC SURGICAL SITE INFECTION PROPHYLAXIS. Right 8/5/2015    Procedure: LUMBAR FACET INJECTION OR MEDIAL BRANCH NERVE BLOCK;  Surgeon: Diony Zuniga MD;  Location: [facility]    PATIENT WITHOUGH PREOPERATIVE ORDER FOR IV ANTIBIOTIC SURGICAL SITE INFECTION PROPHYLAXIS. N/A 8/20/2015    Procedure: TRANSFORAMINAL EPIDURAL - LUMBAR;  Surgeon: Alejandro Mcmillan MD;  Location: [facility]    PATIENT WITHOUGH PREOPERATIVE ORDER FOR IV ANTIBIOTIC SURGICAL SITE INFECTION PROPHYLAXIS. Right 9/4/2015    Procedure: TRANSFORAMINAL EPIDURAL - LUMBAR;  Surgeon: Alejandro Mcmillan MD;  Location: [facility]    PATIENT WITHOUGH PREOPERATIVE ORDER FOR IV ANTIBIOTIC SURGICAL SITE INFECTION PROPHYLAXIS. N/A 9/18/2015    Procedure: LUMBAR EPIDURAL;  Surgeon: Diony Zuniga MD;  Location: [facility]    PATIENT WITHOUGH PREOPERATIVE ORDER FOR IV ANTIBIOTIC SURGICAL SITE INFECTION PROPHYLAXIS. N/A 10/2/2015    Procedure: LUMBAR EPIDURAL;  Surgeon: Diony Zuniga MD;  Location: [facility]    PATIENT WITHOUGH PREOPERATIVE ORDER FOR IV ANTIBIOTIC SURGICAL SITE INFECTION PROPHYLAXIS. Right 1/14/2016    Procedure: LUMBAR FACET INJECTION OR MEDIAL BRANCH NERVE BLOCK;  Surgeon: Diony Zuniga MD;  Location: [facility]    PATIENT WITHOUGH PREOPERATIVE ORDER FOR IV ANTIBIOTIC SURGICAL SITE INFECTION PROPHYLAXIS. Right 2/1/2016    Procedure: LUMBAR FACET INJECTION OR MEDIAL BRANCH NERVE BLOCK;  Surgeon: Diony Zuniga MD;  Location: [facility]    PATIENT WITHOUGH  PREOPERATIVE ORDER FOR IV ANTIBIOTIC SURGICAL SITE INFECTION PROPHYLAXIS. Right 3/2/2016    Procedure: TRANSFORAMINAL EPIDURAL - LUMBAR;  Surgeon: Diony Zuniga MD;  Location: [facility]    PATIENT WITHOUGH PREOPERATIVE ORDER FOR IV ANTIBIOTIC SURGICAL SITE INFECTION PROPHYLAXIS. Right 3/10/2016    Procedure: TRANSFORAMINAL EPIDURAL - LUMBAR;  Surgeon: Diony Zuniga MD;  Location: [facility]    PATIENT WITHOUGH PREOPERATIVE ORDER FOR IV ANTIBIOTIC SURGICAL SITE INFECTION PROPHYLAXIS. Right 3/29/2016    Procedure: TRANSFORAMINAL EPIDURAL - LUMBAR;  Surgeon: Diony Zuniga MD;  Location: [facility]    SINUS SURGERY        deviated septum    SPECIAL SERVICE OR REPORT  2002    S/P uvulopalatoplasty, sinus surgery, septoplasty      Family History:   Family History   Problem Relation Age of Onset    Heart Disorder Sister      Social History:    reports that he has never smoked. He has never used smokeless tobacco. He reports current alcohol use. He reports that he does not use drugs.     Allergies: No Known Allergies    Medications:    No current facility-administered medications on file prior to encounter.     Current Outpatient Medications on File Prior to Encounter   Medication Sig Dispense Refill    Donepezil HCl 10 MG Oral Tab Take 1 tablet (10 mg total) by mouth nightly. at bedtime 90 tablet 3    Memantine HCl 10 MG Oral Tab Take 1 tablet (10 mg total) by mouth 2 (two) times daily. 180 tablet 3    escitalopram 10 MG Oral Tab Take 1 tablet (10 mg total) by mouth daily. 90 tablet 3    MAGNESIUM OXIDE 400 (240 Mg) MG Oral Tab TAKE ONE TABLET BY MOUTH EVERY MORNING 31 tablet PRN    METOPROLOL SUCCINATE ER 25 MG Oral Tablet 24 Hr TAKE ONE TABLET BY MOUTH EVERY MORNING 31 tablet PRN    THERA/BETA-CAROTENE Oral Tab TAKE ONE TABLET BY MOUTH EVERY MORNING 31 tablet PRN    ASPIRIN ADULT LOW STRENGTH 81 MG Oral Tab EC TAKE ONE TABLET BY MOUTH EVERY MORNING 31  tablet PRN    Pantoprazole Sodium 40 MG Oral Tab EC TAKE 1 TABLET (40 MG TOTAL) BY MOUTH EVERY MORNING BEFORE BREAKFAST. 90 tablet 1    spironolactone 25 MG Oral Tab Take 0.5 tablets (12.5 mg total) by mouth daily. 30 tablet 5    furosemide 20 MG Oral Tab Take 1 tablet (20 mg total) by mouth 2 (two) times daily. 60 tablet 5    Warfarin Sodium 5 MG Oral Tab 5.5 mg po q pm  6    Cholecalciferol (VITAMIN D) 1000 UNITS Oral Cap Take 1 capsule by mouth daily.         Review of Systems:   A comprehensive review of systems was completed.    Pertinent positives and negatives noted in the HPI.    Objective:   Physical Exam:    /77   Pulse 76   Temp 97.8 °F (36.6 °C) (Temporal)   Resp 19   Wt 138 lb (62.6 kg)   SpO2 97%   BMI 18.72 kg/m²   General: NAD, confused  Respiratory: No rhonchi, no wheezes  Cardiovascular: S1, S2.   Abdomen: Soft, Non-tender, Non-distended, Positive bowel sounds  Neuro: no new focal deficits noted, unable to fully eval  Extremities: No edema      Results:    Labs:      Labs Last 24 Hours:  Recent Labs   Lab 12/26/23 2123   WBC 19.0*   HGB 16.2   MCV 95.3   .0   INR 3.29*       Recent Labs   Lab 12/26/23 2123   *   BUN 27*   CREATSERUM 1.92*   CA 9.4   ALB 3.3*      K 3.5      CO2 25.0   ALKPHO 95   AST 22   ALT 15*   BILT 2.0   TP 7.8       CrCl cannot be calculated (Unknown ideal weight.).    Recent Labs   Lab 12/26/23 2123   TROPHS 23       Recent Labs   Lab 12/26/23 2123   PTP 34.0*   INR 3.29*       No results for input(s): \"TROP\", \"CK\" in the last 168 hours.      Imaging: Imaging data reviewed in Epic.    Assessment & Plan:      #SBO  NPO  IVF  NGT to LIS    #Leukocytosis  CT with segment of thickened colon in RUQ with some enhancement and inflammation  Possible GB inflammation vs primary colon inflammation  Given zosyn in ED  LFT's not sig elevated  Check lactic acid  Hold further ABX for now    #SADAF  IVF    #Dementia    #A. Fib  IV BB  Hold  coumadin    #Coumadin coagulopathy  Hold coumadin  Monitor INR    #Advance care planning  Voluntary meeting  17 minutes spent face-to-face with the patient and daughter.  ACP discussed, explained and reviewed with them in detail.  The patient is a DNAR/S.  CODE STATUS updated in system.          Plan of care discussed with ED physician    Robert Estrada MD    Supplementary Documentation:     The  [employer] Cures Act makes medical notes like these available to patients in the interest of transparency. Please be advised this is a medical document. Medical documents are intended to carry relevant information, facts as evident, and the clinical opinion of the practitioner. The medical note is intended as peer to peer communication and may appear blunt or direct. It is written in medical language and may contain abbreviations or verbiage that are unfamiliar.                                     Electronically signed by Robert Estrada MD on 2023  2:11 AM     H&P signed by Robert Estrada MD at 2023  2:09 AM  Version 1 of 2    Author: Robert Estrada MD Service: — Author Type: Physician    Filed: 2023  2:09 AM Date of Service: 2023  1:53 AM Status: Signed    : Robert Estrada MD (Physician)    Related Notes: Addendum by Robert Estrada MD (Physician) filed at 2023  2:11 AM         [facility]     Liang Archibald Patient Status:  Emergency    3/4/1940 MRN [id number]   [facility] EM[facility]MENT Attending No att. providers found   Hosp Day # 0 PCP Victorina Morris MD     Chief Complaint:   Chief Complaint   Patient presents with    Nausea/Vomiting/Diarrhea       Subjective:    History of Present Illness:     Liang Archibald is a 83 year old male with a past medical history of dementia, anxiety, CAD, GERD.  Patient lives in a nursing home.  He comes to the emergency room now secondary to episodes of vomiting.  Imaging showed signs  of bowel obstruction.    History/Other:    Past Medical History:  Past Medical History:   Diagnosis Date    Anxiety 11/18/2013    Arrhythmia     afib after cabg, resolved now    ASHD (arteriosclerotic heart disease) 10/8/2012    Atherosclerosis of coronary artery     [place] 11/18/2013    Coronary atherosclerosis of unspecified type of vessel, native or graft 1994    S/P MI, angioplasty LAD    Esophageal reflux     Essential hypertension     Hearing impairment     Heart attack (HCC) 1974    Hernia     will be having surgery in the future    High blood pressure     High cholesterol     Hyperlipidemia     Insomnia 11/18/2013    Myocardial infarction (HCC)     1974    Obstructive sleep apnea (adult) (pediatric) SPLIT NIGHT 5-1-16    AHI 6 SaO2 gary 92 % CPAP 8     Unspecified sleep apnea     Visual impairment      Past Surgical History:   Past Surgical History:   Procedure Laterality Date    ADENOIDECTOMY      palate surgery,deviated septum    ANGIOPLASTY (CORONARY)      one vessel    APPENDECTOMY      CABG  6/2014    4 bypass    CATH PERCUTANEOUS  TRANSLUMINAL CORONARY ANGIOPLASTY      COLECTOMY  12/2014    for bowel obstruction    COLON SURGERY      COLONOSCOPY,DIAGNOSTIC  5/23/03    normal    COLONOSCOPY,DIAGNOSTIC  3/16/13    descending polyp  at 40 cm    DRAIN/INJECT LARGE JOINT/BURSA  1/8/2014    Procedure: BURSA INJECTION;  Surgeon: Alejandro Mcmillan MD;  Location: [facility]    FLUOROSCOPIC GUIDANCE NEEDLE PLACEMENT  1/8/2014    Procedure: LUMBAR FACET INJECTION OR MEDIAL BRANCH NERVE BLOCK;  Surgeon: Alejandro Mcmillan MD;  Location: [facility]    HERNIA SURGERY      INGUINAL HERNIA REPAIR Left 4/13/2015    Procedure: HERNIA INGUINAL REPAIR ADULT;  Surgeon: Raheem Goff MD;  Location: [facility] MA[facility] OR    INJ PARAVERT F JNT L/S 1 LEV Right 8/5/2015    Procedure: LUMBAR FACET INJECTION OR MEDIAL BRANCH NERVE BLOCK;  Surgeon: Diony Zuniga MD;  Location: [facility]    INJ PARAVERT F JNT L/S 1 LEV Right 1/14/2016    Procedure: LUMBAR FACET INJECTION OR MEDIAL BRANCH NERVE BLOCK;  Surgeon: Diony uZniga MD;  [facility]    INJ PARAVERT F JNT L/S 1 LEV Right 2/1/2016    Procedure: LUMBAR FACET INJECTION OR MEDIAL BRANCH NERVE BLOCK;  Surgeon: Diony Zuniga MD;  [facility]    INJ PARAVERT F JNT L/S 2 LEV Right 8/5/2015    Procedure: LUMBAR FACET INJECTION OR MEDIAL BRANCH NERVE BLOCK;  Surgeon: Diony Zuniga MD;  [facility]: [facility]    INJ PARAVERT F JNT L/S 2 LEV Right 1/14/2016    Procedure: LUMBAR FACET INJECTION OR MEDIAL BRANCH NERVE BLOCK;  Surgeon: Diony Zuniga MD;  Location: [facility]    INJ PARAVERT F JNT L/S 2 LEV Right 2/1/2016    Procedure: LUMBAR FACET INJECTION OR MEDIAL BRANCH NERVE BLOCK;  Surgeon: Diony Zuniga MD;  Location: [facility]    INJ PARAVERT F JNT L/S 3 LEV Right 8/5/2015    Procedure: LUMBAR FACET INJECTION OR MEDIAL BRANCH NERVE BLOCK;  Surgeon: Diony Zuniga MD;  Location: [facility]    INJECTION, ANESTHETIC/STEROID, TRANSFORAMINAL EPIDURAL; LUMBAR/SACRAL, ADD'L LEVEL N/A 8/20/2015    Procedure: TRANSFORAMINAL EPIDURAL - LUMBAR;  Surgeon: Alejandro Mcmillan MD;  [facility]: [facility]    INJECTION, ANESTHETIC/STEROID, TRANSFORAMINAL EPIDURAL; LUMBAR/SACRAL, ADD'L LEVEL Right 9/4/2015    Procedure: TRANSFORAMINAL EPIDURAL - LUMBAR;  Surgeon: Alejandro Mcmillan MD;  [facility]: [facility]    INJECTION, ANESTHETIC/STEROID, TRANSFORAMINAL EPIDURAL; LUMBAR/SACRAL, ADD'L LEVEL Right 3/2/2016    Procedure: TRANSFORAMINAL EPIDURAL - LUMBAR;  Surgeon: Diony Zuniga MD;  [facility]: [facility]    INJECTION, ANESTHETIC/STEROID, TRANSFORAMINAL EPIDURAL; LUMBAR/SACRAL, ADD'L LEVEL Right 3/10/2016    Procedure: TRANSFORAMINAL EPIDURAL - LUMBAR;   Surgeon: Diony Zuniga MD;  Location: [facility]    INJECTION, ANESTHETIC/STEROID, TRANSFORAMINAL EPIDURAL; LUMBAR/SACRAL, ADD'L LEVEL Right 3/29/2016    Procedure: TRANSFORAMINAL EPIDURAL - LUMBAR;  Surgeon: Diony Zuniga MD;  Location: [facility]    INJECTION, ANESTHETIC/STEROID, TRANSFORAMINAL EPIDURAL; LUMBAR/SACRAL, SINGLE LEVEL N/A 8/20/2015    Procedure: TRANSFORAMINAL EPIDURAL - LUMBAR;  Surgeon: Alejandro Mcmillan MD;  Location: [facility]    INJECTION, ANESTHETIC/STEROID, TRANSFORAMINAL EPIDURAL; LUMBAR/SACRAL, SINGLE LEVEL Right 9/4/2015    Procedure: TRANSFORAMINAL EPIDURAL - LUMBAR;  Surgeon: Alejandro Mcmillan MD;  Location: [facility]    INJECTION, ANESTHETIC/STEROID, TRANSFORAMINAL EPIDURAL; LUMBAR/SACRAL, SINGLE LEVEL Right 3/2/2016    Procedure: TRANSFORAMINAL EPIDURAL - LUMBAR;  Surgeon: Diony Zuniga MD;  Location: [facility]    INJECTION, ANESTHETIC/STEROID, TRANSFORAMINAL EPIDURAL; LUMBAR/SACRAL, SINGLE LEVEL Right 3/10/2016    Procedure: TRANSFORAMINAL EPIDURAL - LUMBAR;  Surgeon: Diony Zuniga MD;  Location: [facility]    INJECTION, ANESTHETIC/STEROID, TRANSFORAMINAL EPIDURAL; LUMBAR/SACRAL, SINGLE LEVEL Right 3/29/2016    Procedure: TRANSFORAMINAL EPIDURAL - LUMBAR;  Surgeon: Diony Zuniga MD;  Location: [facility]    INJECTION, W/WO CONTRAST, DX/THERAPEUTIC SUBSTANCE, EPIDURAL/SUBARACHNOID; LUMBAR/SACRAL N/A 6/17/2015    Procedure: LUMBAR EPIDURAL;  Surgeon: Alejandro Mcmillan MD;  Location: [facility]    INJECTION, W/WO CONTRAST, DX/THERAPEUTIC SUBSTANCE, EPIDURAL/SUBARACHNOID; LUMBAR/SACRAL N/A 9/18/2015    Procedure: LUMBAR EPIDURAL;  Surgeon: Diony Zuniga MD;  Location: [facility]    INJECTION, W/WO CONTRAST, DX/THERAPEUTIC SUBSTANCE, EPIDURAL/SUBARACHNOID; LUMBAR/SACRAL N/A 10/2/2015    Procedure:  LUMBAR EPIDURAL;  Surgeon: Diony Zuniga MD;  Location: [facility]    OPEN HEART SURGICAL PROFILE      OTHER SURGICAL HISTORY      Tonsillectomy uvelectomy     OTHER SURGICAL HISTORY  10/2015    surgery for bowel obstruction    PATIENT DOCUMENTED NOT TO HAVE EXPERIENCED ANY OF THE FOLLOWING EVENTS  1/8/2014    Procedure: LUMBAR FACET INJECTION OR MEDIAL BRANCH NERVE BLOCK;  Surgeon: Alejandro Mcmillan MD;  Location: [facility]    PATIENT DOCUMENTED NOT TO HAVE EXPERIENCED ANY OF THE FOLLOWING EVENTS N/A 6/17/2015    Procedure: LUMBAR EPIDURAL;  Surgeon: Alejandro Mcmillan MD;  Location: [facility]    PATIENT DOCUMENTED NOT TO HAVE EXPERIENCED ANY OF THE FOLLOWING EVENTS Right 8/5/2015    Procedure: LUMBAR FACET INJECTION OR MEDIAL BRANCH NERVE BLOCK;  Surgeon: Diony Zuniga MD;  Location: [facility]    PATIENT DOCUMENTED NOT TO HAVE EXPERIENCED ANY OF THE FOLLOWING EVENTS N/A 8/20/2015    Procedure: TRANSFORAMINAL EPIDURAL - LUMBAR;  Surgeon: Alejandro Mcmillan MD;  Location: [facility]    PATIENT DOCUMENTED NOT TO HAVE EXPERIENCED ANY OF THE FOLLOWING EVENTS Right 9/4/2015    Procedure: TRANSFORAMINAL EPIDURAL - LUMBAR;  Surgeon: Alejandro Mcmillan MD;  Location: [facility]    PATIENT DOCUMENTED NOT TO HAVE EXPERIENCED ANY OF THE FOLLOWING EVENTS N/A 9/18/2015    Procedure: LUMBAR EPIDURAL;  Surgeon: Diony Zuniga MD;  Location: [facility]    PATIENT DOCUMENTED NOT TO HAVE EXPERIENCED ANY OF THE FOLLOWING EVENTS N/A 10/2/2015    Procedure: LUMBAR EPIDURAL;  Surgeon: Diony Zuniga MD;  Location: [facility]    PATIENT DOCUMENTED NOT TO HAVE EXPERIENCED ANY OF THE FOLLOWING EVENTS Right 1/14/2016    Procedure: LUMBAR FACET INJECTION OR MEDIAL BRANCH NERVE BLOCK;  Surgeon: Diony Zuniga MD;  Location: [facility]    PATIENT DOCUMENTED  NOT TO HAVE EXPERIENCED ANY OF THE FOLLOWING EVENTS Right 2/1/2016    Procedure: LUMBAR FACET INJECTION OR MEDIAL BRANCH NERVE BLOCK;  Surgeon: Diony Zuniga MD;  [place] [facility]    PATIENT DOCUMENTED NOT TO HAVE EXPERIENCED ANY OF THE FOLLOWING EVENTS Right 3/2/2016    Procedure: TRANSFORAMINAL EPIDURAL - LUMBAR;  Surgeon: Diony Zuniga MD;  Location: [facility]    PATIENT DOCUMENTED NOT TO HAVE EXPERIENCED ANY OF THE FOLLOWING EVENTS Right 3/10/2016    Procedure: TRANSFORAMINAL EPIDURAL - LUMBAR;  Surgeon: Diony Zuniga MD;  [facility] [facility]    PATIENT DOCUMENTED NOT TO HAVE EXPERIENCED ANY OF THE FOLLOWING EVENTS Right 3/29/2016    Procedure: TRANSFORAMINAL EPIDURAL - LUMBAR;  Surgeon: Diony Zuniga MD;  Location: [facility]    PATIENT WITHOUGH PREOPERATIVE ORDER FOR IV ANTIBIOTIC SURGICAL SITE INFECTION PROPHYLAXIS.  1/8/2014    Procedure: LUMBAR FACET INJECTION OR MEDIAL BRANCH NERVE BLOCK;  Surgeon: Alejandro Mcmillan MD;  Location: [facility]    PATIENT WITHOUGH PREOPERATIVE ORDER FOR IV ANTIBIOTIC SURGICAL SITE INFECTION PROPHYLAXIS. N/A 6/17/2015    Procedure: LUMBAR EPIDURAL;  Surgeon: Alejandro Mcmillan MD;  [place] [facility]    PATIENT WITHOUGH PREOPERATIVE ORDER FOR IV ANTIBIOTIC SURGICAL SITE INFECTION PROPHYLAXIS. Right 8/5/2015    Procedure: LUMBAR FACET INJECTION OR MEDIAL BRANCH NERVE BLOCK;  Surgeon: Diony Zuniga MD;  [facility] [facility]    PATIENT WITHOUGH PREOPERATIVE ORDER FOR IV ANTIBIOTIC SURGICAL SITE INFECTION PROPHYLAXIS. N/A 8/20/2015    Procedure: TRANSFORAMINAL EPIDURAL - LUMBAR;  Surgeon: Alejandro Mcmillan MD;  Location: [facility]    PATIENT WITHOUGH PREOPERATIVE ORDER FOR IV ANTIBIOTIC SURGICAL SITE INFECTION PROPHYLAXIS. Right 9/4/2015    Procedure: TRANSFORAMINAL EPIDURAL - LUMBAR;  Surgeon: Hipolito  MD Alejandro;  Location: [facility]    PATIENT WITHOUGH PREOPERATIVE ORDER FOR IV ANTIBIOTIC SURGICAL SITE INFECTION PROPHYLAXIS. N/A 9/18/2015    Procedure: LUMBAR EPIDURAL;  Surgeon: Diony Zuniga MD;  Location: [facility]    PATIENT WITHOUGH PREOPERATIVE ORDER FOR IV ANTIBIOTIC SURGICAL SITE INFECTION PROPHYLAXIS. N/A 10/2/2015    Procedure: LUMBAR EPIDURAL;  Surgeon: Diony Zuniga MD;  Location: [facility]    PATIENT WITHOUGH PREOPERATIVE ORDER FOR IV ANTIBIOTIC SURGICAL SITE INFECTION PROPHYLAXIS. Right 1/14/2016    Procedure: LUMBAR FACET INJECTION OR MEDIAL BRANCH NERVE BLOCK;  Surgeon: Diony Zuniga MD;  Location: [facility]    PATIENT WITHOUGH PREOPERATIVE ORDER FOR IV ANTIBIOTIC SURGICAL SITE INFECTION PROPHYLAXIS. Right 2/1/2016    Procedure: LUMBAR FACET INJECTION OR MEDIAL BRANCH NERVE BLOCK;  Surgeon: Diony Zuniga MD;  Location: [facility]    PATIENT WITHOUGH PREOPERATIVE ORDER FOR IV ANTIBIOTIC SURGICAL SITE INFECTION PROPHYLAXIS. Right 3/2/2016    Procedure: TRANSFORAMINAL EPIDURAL - LUMBAR;  Surgeon: Diony Zuniga MD;  Location: [facility]    PATIENT WITHOUGH PREOPERATIVE ORDER FOR IV ANTIBIOTIC SURGICAL SITE INFECTION PROPHYLAXIS. Right 3/10/2016    Procedure: TRANSFORAMINAL EPIDURAL - LUMBAR;  Surgeon: Diony Zuniga MD;  Location: [facility]    PATIENT WITHOUGH PREOPERATIVE ORDER FOR IV ANTIBIOTIC SURGICAL SITE INFECTION PROPHYLAXIS. Right 3/29/2016    Procedure: TRANSFORAMINAL EPIDURAL - LUMBAR;  Surgeon: Diony Zuniga MD;  Location: [facility]    SINUS SURGERY        deviated septum    SPECIAL SERVICE OR REPORT  2002    S/P uvulopalatoplasty, sinus surgery, septoplasty      Family History:   Family History   Problem Relation Age of Onset    Heart Disorder Sister      Social History:    reports that he has never smoked. He has  never used smokeless tobacco. He reports current alcohol use. He reports that he does not use drugs.     Allergies: No Known Allergies    Medications:    No current facility-administered medications on file prior to encounter.     Current Outpatient Medications on File Prior to Encounter   Medication Sig Dispense Refill    Donepezil HCl 10 MG Oral Tab Take 1 tablet (10 mg total) by mouth nightly. at bedtime 90 tablet 3    Memantine HCl 10 MG Oral Tab Take 1 tablet (10 mg total) by mouth 2 (two) times daily. 180 tablet 3    escitalopram 10 MG Oral Tab Take 1 tablet (10 mg total) by mouth daily. 90 tablet 3    MAGNESIUM OXIDE 400 (240 Mg) MG Oral Tab TAKE ONE TABLET BY MOUTH EVERY MORNING 31 tablet PRN    METOPROLOL SUCCINATE ER 25 MG Oral Tablet 24 Hr TAKE ONE TABLET BY MOUTH EVERY MORNING 31 tablet PRN    THERA/BETA-CAROTENE Oral Tab TAKE ONE TABLET BY MOUTH EVERY MORNING 31 tablet PRN    ASPIRIN ADULT LOW STRENGTH 81 MG Oral Tab EC TAKE ONE TABLET BY MOUTH EVERY MORNING 31 tablet PRN    Pantoprazole Sodium 40 MG Oral Tab EC TAKE 1 TABLET (40 MG TOTAL) BY MOUTH EVERY MORNING BEFORE BREAKFAST. 90 tablet 1    spironolactone 25 MG Oral Tab Take 0.5 tablets (12.5 mg total) by mouth daily. 30 tablet 5    furosemide 20 MG Oral Tab Take 1 tablet (20 mg total) by mouth 2 (two) times daily. 60 tablet 5    Warfarin Sodium 5 MG Oral Tab 5.5 mg po q pm  6    Cholecalciferol (VITAMIN D) 1000 UNITS Oral Cap Take 1 capsule by mouth daily.         Review of Systems:   A comprehensive review of systems was completed.    Pertinent positives and negatives noted in the HPI.    Objective:   Physical Exam:    /77   Pulse 76   Temp 97.8 °F (36.6 °C) (Temporal)   Resp 19   Wt 138 lb (62.6 kg)   SpO2 97%   BMI 18.72 kg/m²   General: NAD, confused  Respiratory: No rhonchi, no wheezes  Cardiovascular: S1, S2.   Abdomen: Soft, Non-tender, Non-distended, Positive bowel sounds  Neuro: no new focal deficits noted, unable to fully  eval  Extremities: No edema      Results:    Labs:      Labs Last 24 Hours:  Recent Labs   Lab 12/26/23 2123   WBC 19.0*   HGB 16.2   MCV 95.3   .0   INR 3.29*       Recent Labs   Lab 12/26/23 2123   *   BUN 27*   CREATSERUM 1.92*   CA 9.4   ALB 3.3*      K 3.5      CO2 25.0   ALKPHO 95   AST 22   ALT 15*   BILT 2.0   TP 7.8       CrCl cannot be calculated (Unknown ideal weight.).    Recent Labs   Lab 12/26/23 2123   TROPHS 23       Recent Labs   Lab 12/26/23 2123   PTP 34.0*   INR 3.29*       No results for input(s): \"TROP\", \"CK\" in the last 168 hours.      Imaging: Imaging data reviewed in Epic.    Assessment & Plan:      #SBO  NPO  IVF  NGT to LIS    #Leukocytosis  CT with segment of thickened colon in RUQ with some enhancement and inflammation  Possible GB inflammation vs primary colon inflammation  Given zosyn in ED  LFT's not sig elevated  Check lactic acid  Hold further ABX for now    #SADAF  IVF    #Dementia    #A. Fib  IV BB  Hold coumadin    #Coumadin coagulopathy  Hold coumadin  Monitor INR        Plan of care discussed with ED physician    Robert Estrada MD    Supplementary Documentation:     The 21st Century Cures Act makes medical notes like these available to patients in the interest of transparency. Please be advised this is a medical document. Medical documents are intended to carry relevant information, facts as evident, and the clinical opinion of the practitioner. The medical note is intended as peer to peer communication and may appear blunt or direct. It is written in medical language and may contain abbreviations or verbiage that are unfamiliar.                                     Electronically signed by Robert Estrada MD on 12/27/2023  2:09 AM              Consults - MD Consult Notes      Consults signed by Sary Vera MD at 12/28/2023 12:20 PM     Author: Sary Vera MD Service: General Surgery Author Type: Physician    Filed: 12/28/2023 12:20 PM Date  of Service: 2023  9:26 AM Status: Signed    : Sary Vera MD (Physician)     Consult Orders    1. ED Consult to General Surgery/Trauma [[id number]] ordered by Joel Jay MD at [address] [place] [address]  Report of  Surgical Consultation with History and Physical Exam    Linag Archibald Patient Status:  Inpatient    3/4/1940 MRN [id number]   Location [facility] 3SW-A Attending Miguel Nicole MD   Hosp Day # 0 PCP Victorina Morris MD     Reason for Surgical Consultation:  I am seeing this patient at the request of Dr. Jay for nausea, vomiting and abdominal pain.     History of Present Illness:  Liang Archibald is a a(n) 83 year old male who presented to the ER via EMS yesterday evening with nausea, vomiting and abdominal pain.  The patient does not have family at bedside.  He has Alzheimer's disease and is a poor historian.  He is not oriented to place or time.  He is oriented to person.  The only history he is able to provide is that he arrived here via ambulance.  The remainder of his history was retrieved via his EMR.    The patient arrived via EMS to the emergency department from a skilled nursing facility yesterday evening after nausea and vomiting at dinner.  He had had diarrhea earlier that day.    At time of today's exam, he denies nausea or vomiting.  He denies any abdominal pain.    CT scan of the abdomen and pelvis upon presentation to the emergency department revealed a small bowel obstruction measuring up to 5.2 cm with a transition point in the right abdomen in the region of enteric small bowel staples.  There is some free fluid in the right upper quadrant.  The gallbladder is also dilated with surrounding fat stranding.  Lastly a segment of colon in the right upper quadrant is narrowed and thickened.  This is adjacent to the gallbladder.    His vital signs are stable.  Leukocytosis with a white blood cell count of 19.  Hemoglobin 16.2.  BUN and  creatinine.  27.2 respectively.  AST 22, ALT 15 and alkaline phosphatase 95, all within normal limits. Total bilirubin is 2.0.    The patient appears to have been hospitalized in November 2017 with with a small bowel obstruction.  He was seen by Dr. Alfaro at that time and treated conservatively with NG tube decompression.    The patient has a past medical history of dementia, coronary disease and GERD.    He is unaware of his surgical history, but he does have a very well-healed midline incision.              History:  Past Medical History:   Diagnosis Date    A-fib ([facility])     Alzheimer's dementia ([facility])     Anxiety 11/18/2013    Arrhythmia     afib after cabg, resolved now    ASHD (arteriosclerotic heart disease) 10/08/2012    Atherosclerosis of coronary artery     [place] 11/18/2013    Coronary atherosclerosis of unspecified type of vessel, native or graft 01/01/1994    S/P MI, angioplasty LAD    Esophageal reflux     Essential hypertension     Hearing impairment     Heart attack ([facility]) 01/01/1974    Hernia     will be having surgery in the future    High blood pressure     High cholesterol     Hyperlipidemia     Insomnia 11/18/2013    Myocardial infarction ([facility])     1974    Nausea and vomiting in adult 12/27/2023    Obstructive sleep apnea (adult) (pediatric) SPLIT NIGHT 5-1-16    AHI 6 SaO2 gary 92 % CPAP 8     SBO (small bowel obstruction) ([facility])     Unspecified sleep apnea     Visual impairment      Past Surgical History:   Procedure Laterality Date    ADENOIDECTOMY      palate surgery,deviated septum    ANGIOPLASTY (CORONARY)      one vessel    APPENDECTOMY      CABG  6/2014    4 bypass    CATH PERCUTANEOUS  TRANSLUMINAL CORONARY ANGIOPLASTY      COLECTOMY  12/2014    for bowel obstruction    COLON SURGERY      COLONOSCOPY,DIAGNOSTIC  5/23/03    normal    COLONOSCOPY,DIAGNOSTIC  3/16/13    descending polyp  at 40 cm    DRAIN/INJECT LARGE JOINT/BURSA  1/8/2014    Procedure: BURSA INJECTION;  Surgeon: Hipolito  MD Alejandro;  Location: [facility]    FLUOROSCOPIC GUIDANCE NEEDLE PLACEMENT  1/8/2014    Procedure: LUMBAR FACET INJECTION OR MEDIAL BRANCH NERVE BLOCK;  Surgeon: Alejandro Mcmillan MD;  Location: [facility]    HERNIA SURGERY      INGUINAL HERNIA REPAIR Left 4/13/2015    Procedure: HERNIA INGUINAL REPAIR ADULT;  Surgeon: Raheem Goff MD;  Location: [facility] MAIN OR    INJ PARAVERT F JNT L/S 1 LEV Right 8/5/2015    Procedure: LUMBAR FACET INJECTION OR MEDIAL BRANCH NERVE BLOCK;  Surgeon: Diony Zuniga MD;  Location: [facility]    INJ PARAVERT F JNT L/S 1 LEV Right 1/14/2016    Procedure: LUMBAR FACET INJECTION OR MEDIAL BRANCH NERVE BLOCK;  Surgeon: Diony Zuniga MD;  Location: [facility]    INJ PARAVERT F JNT L/S 1 LEV Right 2/1/2016    Procedure: LUMBAR FACET INJECTION OR MEDIAL BRANCH NERVE BLOCK;  Surgeon: Diony Zuniga MD;  [facility] [facility]    INJ PARAVERT F JNT L/S 2 LEV Right 8/5/2015    Procedure: LUMBAR FACET INJECTION OR MEDIAL BRANCH NERVE BLOCK;  Surgeon: Diony Zuniga MD;  Location: [facility]    INJ PARAVERT F JNT L/S 2 LEV Right 1/14/2016    Procedure: LUMBAR FACET INJECTION OR MEDIAL BRANCH NERVE BLOCK;  Surgeon: Diony Zuniga MD;  Location: [facility]    INJ PARAVERT F JNT L/S 2 LEV Right 2/1/2016    Procedure: LUMBAR FACET INJECTION OR MEDIAL BRANCH NERVE BLOCK;  Surgeon: Diony Zuniga MD;  Location: [facility]    INJ PARAVERT F JNT L/S 3 LEV Right 8/5/2015    Procedure: LUMBAR FACET INJECTION OR MEDIAL BRANCH NERVE BLOCK;  Surgeon: Diony Zuniga MD;  Location: [facility]    INJECTION, ANESTHETIC/STEROID, TRANSFORAMINAL EPIDURAL; LUMBAR/SACRAL, ADD'L LEVEL N/A 8/20/2015    Procedure: TRANSFORAMINAL EPIDURAL - LUMBAR;  Surgeon: Alejandro Mcmillan MD;  Location: [facility]    INJECTION,  ANESTHETIC/STEROID, TRANSFORAMINAL EPIDURAL; LUMBAR/SACRAL, ADD'L LEVEL Right 9/4/2015    Procedure: TRANSFORAMINAL EPIDURAL - LUMBAR;  Surgeon: Alejandro Mcmillan MD;  [place]: [facility]    INJECTION, ANESTHETIC/STEROID, TRANSFORAMINAL EPIDURAL; LUMBAR/SACRAL, ADD'L LEVEL Right 3/2/2016    Procedure: TRANSFORAMINAL EPIDURAL - LUMBAR;  Surgeon: Diony Zuniga MD;  Location: [facility]    INJECTION, ANESTHETIC/STEROID, TRANSFORAMINAL EPIDURAL; LUMBAR/SACRAL, ADD'L LEVEL Right 3/10/2016    Procedure: TRANSFORAMINAL EPIDURAL - LUMBAR;  Surgeon: Diony Zuniga MD;  Location: [facility]    INJECTION, ANESTHETIC/STEROID, TRANSFORAMINAL EPIDURAL; LUMBAR/SACRAL, ADD'L LEVEL Right 3/29/2016    Procedure: TRANSFORAMINAL EPIDURAL - LUMBAR;  Surgeon: Diony Zuniga MD;  Location: [facility] [facility]    INJECTION, ANESTHETIC/STEROID, TRANSFORAMINAL EPIDURAL; LUMBAR/SACRAL, SINGLE LEVEL N/A 8/20/2015    Procedure: TRANSFORAMINAL EPIDURAL - LUMBAR;  Surgeon: Alejandro Mcmillan MD;  Location: [facility]    INJECTION, ANESTHETIC/STEROID, TRANSFORAMINAL EPIDURAL; LUMBAR/SACRAL, SINGLE LEVEL Right 9/4/2015    Procedure: TRANSFORAMINAL EPIDURAL - LUMBAR;  Surgeon: Alejandro Mcmillan MD;  Location: [facility]    INJECTION, ANESTHETIC/STEROID, TRANSFORAMINAL EPIDURAL; LUMBAR/SACRAL, SINGLE LEVEL Right 3/2/2016    Procedure: TRANSFORAMINAL EPIDURAL - LUMBAR;  Surgeon: Diony Zuniga MD;  Location: [facility]    INJECTION, ANESTHETIC/STEROID, TRANSFORAMINAL EPIDURAL; LUMBAR/SACRAL, SINGLE LEVEL Right 3/10/2016    Procedure: TRANSFORAMINAL EPIDURAL - LUMBAR;  Surgeon: Diony Zuniga MD;  Location: [facility]    INJECTION, ANESTHETIC/STEROID, TRANSFORAMINAL EPIDURAL; LUMBAR/SACRAL, SINGLE LEVEL Right 3/29/2016    Procedure: TRANSFORAMINAL EPIDURAL - LUMBAR;  Surgeon: Diony Zuniga MD;   Location: [facility]    INJECTION, W/WO CONTRAST, DX/THERAPEUTIC SUBSTANCE, EPIDURAL/SUBARACHNOID; LUMBAR/SACRAL N/A 6/17/2015    Procedure: LUMBAR EPIDURAL;  Surgeon: Alejandro Mcmillan MD;  Location: [facility]    INJECTION, W/WO CONTRAST, DX/THERAPEUTIC SUBSTANCE, EPIDURAL/SUBARACHNOID; LUMBAR/SACRAL N/A 9/18/2015    Procedure: LUMBAR EPIDURAL;  Surgeon: Diony Zuniga MD;  Location: [facility]    INJECTION, W/WO CONTRAST, DX/THERAPEUTIC SUBSTANCE, EPIDURAL/SUBARACHNOID; LUMBAR/SACRAL N/A 10/2/2015    Procedure: LUMBAR EPIDURAL;  Surgeon: Diony Zuniga MD;  Location: [facility]    OPEN HEART SURGICAL PROFILE      OTHER SURGICAL HISTORY      Tonsillectomy uvelectomy     OTHER SURGICAL HISTORY  10/2015    surgery for bowel obstruction    PATIENT DOCUMENTED NOT TO HAVE EXPERIENCED ANY OF THE FOLLOWING EVENTS  1/8/2014    Procedure: LUMBAR FACET INJECTION OR MEDIAL BRANCH NERVE BLOCK;  Surgeon: Alejandro Mcmillan MD;  Location: [facility]    PATIENT DOCUMENTED NOT TO HAVE EXPERIENCED ANY OF THE FOLLOWING EVENTS N/A 6/17/2015    Procedure: LUMBAR EPIDURAL;  Surgeon: Alejandro Mcmillan MD;  Location: [facility]    PATIENT DOCUMENTED NOT TO HAVE EXPERIENCED ANY OF THE FOLLOWING EVENTS Right 8/5/2015    Procedure: LUMBAR FACET INJECTION OR MEDIAL BRANCH NERVE BLOCK;  Surgeon: Diony Zuniga MD;  Location: [facility]    PATIENT DOCUMENTED NOT TO HAVE EXPERIENCED ANY OF THE FOLLOWING EVENTS N/A 8/20/2015    Procedure: TRANSFORAMINAL EPIDURAL - LUMBAR;  Surgeon: Alejandro Mcmillan MD;  Location: [facility]    PATIENT DOCUMENTED NOT TO HAVE EXPERIENCED ANY OF THE FOLLOWING EVENTS Right 9/4/2015    Procedure: TRANSFORAMINAL EPIDURAL - LUMBAR;  Surgeon: Alejandro Mcmillan MD;  Location: [facility]    PATIENT DOCUMENTED NOT TO HAVE EXPERIENCED ANY OF  THE FOLLOWING EVENTS N/A 9/18/2015    Procedure: LUMBAR EPIDURAL;  Surgeon: Diony Zuniga MD;  Location: [facility]    PATIENT DOCUMENTED NOT TO HAVE EXPERIENCED ANY OF THE FOLLOWING EVENTS N/A 10/2/2015    Procedure: LUMBAR EPIDURAL;  Surgeon: Diony Zuniga MD;  Location: [facility]    PATIENT DOCUMENTED NOT TO HAVE EXPERIENCED ANY OF THE FOLLOWING EVENTS Right 1/14/2016    Procedure: LUMBAR FACET INJECTION OR MEDIAL BRANCH NERVE BLOCK;  Surgeon: Diony Zuniga MD;  Location: [facility]    PATIENT DOCUMENTED NOT TO HAVE EXPERIENCED ANY OF THE FOLLOWING EVENTS Right 2/1/2016    Procedure: LUMBAR FACET INJECTION OR MEDIAL BRANCH NERVE BLOCK;  Surgeon: Diony Zuniga MD;  Location: [facility]    PATIENT DOCUMENTED NOT TO HAVE EXPERIENCED ANY OF THE FOLLOWING EVENTS Right 3/2/2016    Procedure: TRANSFORAMINAL EPIDURAL - LUMBAR;  Surgeon: Diony Zuniga MD;  Location: [facility]    PATIENT DOCUMENTED NOT TO HAVE EXPERIENCED ANY OF THE FOLLOWING EVENTS Right 3/10/2016    Procedure: TRANSFORAMINAL EPIDURAL - LUMBAR;  Surgeon: Diony Zuniga MD;  Location: [facility]    PATIENT DOCUMENTED NOT TO HAVE EXPERIENCED ANY OF THE FOLLOWING EVENTS Right 3/29/2016    Procedure: TRANSFORAMINAL EPIDURAL - LUMBAR;  Surgeon: Diony Zuniga MD;  Location: [facility]    PATIENT WITHOUGH PREOPERATIVE ORDER FOR IV ANTIBIOTIC SURGICAL SITE INFECTION PROPHYLAXIS.  1/8/2014    Procedure: LUMBAR FACET INJECTION OR MEDIAL BRANCH NERVE BLOCK;  Surgeon: Alejandro Mcmillan MD;  Location: [facility]    PATIENT WITHOUGH PREOPERATIVE ORDER FOR IV ANTIBIOTIC SURGICAL SITE INFECTION PROPHYLAXIS. N/A 6/17/2015    Procedure: LUMBAR EPIDURAL;  Surgeon: Alejandro Mcmillan MD;  Location: [facility]    PATIENT WITHOUGH PREOPERATIVE ORDER FOR IV ANTIBIOTIC SURGICAL SITE  INFECTION PROPHYLAXIS. Right 8/5/2015    Procedure: LUMBAR FACET INJECTION OR MEDIAL BRANCH NERVE BLOCK;  Surgeon: Diony Zuniga MD;  [place] [facility]    PATIENT WITHOUGH PREOPERATIVE ORDER FOR IV ANTIBIOTIC SURGICAL SITE INFECTION PROPHYLAXIS. N/A 8/20/2015    Procedure: TRANSFORAMINAL EPIDURAL - LUMBAR;  Surgeon: Alejandro Mcmillan MD;  [place] [facility]    PATIENT WITHOUGH PREOPERATIVE ORDER FOR IV ANTIBIOTIC SURGICAL SITE INFECTION PROPHYLAXIS. Right 9/4/2015    Procedure: TRANSFORAMINAL EPIDURAL - LUMBAR;  Surgeon: Alejandro Mcmillan MD;  [facility]: [facility]    PATIENT WITHOUGH PREOPERATIVE ORDER FOR IV ANTIBIOTIC SURGICAL SITE INFECTION PROPHYLAXIS. N/A 9/18/2015    Procedure: LUMBAR EPIDURAL;  Surgeon: Diony Zuniga MD;  [facility] [facility]    PATIENT WITHOUGH PREOPERATIVE ORDER FOR IV ANTIBIOTIC SURGICAL SITE INFECTION PROPHYLAXIS. N/A 10/2/2015    Procedure: LUMBAR EPIDURAL;  Surgeon: Diony Zuniga MD;  [facility] [facility]    PATIENT WITHOUGH PREOPERATIVE ORDER FOR IV ANTIBIOTIC SURGICAL SITE INFECTION PROPHYLAXIS. Right 1/14/2016    Procedure: LUMBAR FACET INJECTION OR MEDIAL BRANCH NERVE BLOCK;  Surgeon: Diony Zuniga MD;  [facility]: [facility]    PATIENT WITHOUGH PREOPERATIVE ORDER FOR IV ANTIBIOTIC SURGICAL SITE INFECTION PROPHYLAXIS. Right 2/1/2016    Procedure: LUMBAR FACET INJECTION OR MEDIAL BRANCH NERVE BLOCK;  Surgeon: Diony Zuniga MD;  [facility]: [facility]    PATIENT WITHOUGH PREOPERATIVE ORDER FOR IV ANTIBIOTIC SURGICAL SITE INFECTION PROPHYLAXIS. Right 3/2/2016    Procedure: TRANSFORAMINAL EPIDURAL - LUMBAR;  Surgeon: Diony Zuniga MD;  [facility]: [facility]    PATIENT WITHOUGH PREOPERATIVE ORDER FOR IV ANTIBIOTIC SURGICAL SITE INFECTION PROPHYLAXIS. Right 3/10/2016    Procedure: TRANSFORAMINAL EPIDURAL - LUMBAR;   Surgeon: Diony Zuniga MD;  Location: [facility]    PATIENT WITHOUGH PREOPERATIVE ORDER FOR IV ANTIBIOTIC SURGICAL SITE INFECTION PROPHYLAXIS. Right 3/29/2016    Procedure: TRANSFORAMINAL EPIDURAL - LUMBAR;  Surgeon: Diony Zuniga MD;  Location: [facility]    SINUS SURGERY        deviated septum    SPECIAL SERVICE OR REPORT  2002    S/P uvulopalatoplasty, sinus surgery, septoplasty     Family History   Problem Relation Age of Onset    Heart Disorder Sister       reports that he has never smoked. He has never used smokeless tobacco. He reports current alcohol use. He reports that he does not use drugs.    Allergies:  No Known Allergies    Medications:    Current Facility-Administered Medications:     pantoprazole (Protonix) 40 mg in sodium chloride 0.9% PF 10 mL IV push, 40 mg, Intravenous, Q12H    dextrose in lactated ringers 5% infusion, , Intravenous, Continuous    acetaminophen (Tylenol Extra Strength) tab 1,000 mg, 1,000 mg, Oral, Q4H PRN    melatonin tab 3 mg, 3 mg, Oral, Nightly PRN    glycerin-hypromellose- (Artifical Tears) 0.2-0.2-1 % ophthalmic solution 1 drop, 1 drop, Both Eyes, QID PRN    sodium chloride (Saline Mist) 0.65 % nasal solution 1 spray, 1 spray, Each Nare, Q3H PRN    ondansetron (Zofran) 4 MG/2ML injection 4 mg, 4 mg, Intravenous, Q6H PRN    metoclopramide (Reglan) 5 mg/mL injection 10 mg, 10 mg, Intravenous, Q8H PRN    metoprolol (Lopressor) 5 mg/5mL injection 2.5 mg, 2.5 mg, Intravenous, Q6H    sodium chloride 0.9% infusion, 125 mL/hr, Intravenous, Continuous    sodium chloride 0.9% infusion, 125 mL/hr, Intravenous, Continuous    Review of Systems:  Pertinent items are noted in HPI.  Review of systems not obtained due to patient factors.      Physical Exam:  Blood pressure 108/50, pulse 55, temperature 97.6 °F (36.4 °C), temperature source Oral, resp. rate 16, weight 138 lb (62.6 kg), SpO2 95%.    General: Oriented to person.  Cooperative.   No apparent distress.    HEENT: Exam is unremarkable.  Without scleral icterus.  Mucous membranes are moist. EOM are WNL. NG tube in place.     Neck: No tenderness to palpitation.  Full range of motion to flexion and extension, lateral rotation and lateral flexion of cervical spine.  No JVD. Supple.     Lungs: No secondary use of accessory respiratory musculature.    Abdomen: Soft, distended with tympany to percussion.  Nontender to palpation.  No rebound or guarding.  Very well-healed midline incision.    Extremities:  No lower extremity edema noted.  Without clubbing or cyanosis.      Skin: Normal texture and turgor.      Laboratory Data and Relevant X-rays:  Recent Labs   Lab 12/26/23 2123   RBC 4.91   HGB 16.2   HCT 46.8   MCV 95.3   MCH 33.0   MCHC 34.6   RDW 13.2   NEPRELIM 16.65*   WBC 19.0*   .0       Recent Labs   Lab 12/26/23 2123   *   BUN 27*   CREATSERUM 1.92*   CA 9.4   ALB 3.3*      K 3.5      CO2 25.0   ALKPHO 95   AST 22   ALT 15*   BILT 2.0   TP 7.8         Recent Labs   Lab 12/26/23 2123 12/27/23  0443   PTP 34.0* 39.5*   INR 3.29* 3.98*       Imaging    Narrative  PROCEDURE:  CT ABDOMEN PELVIS IV CONTRAST, NO ORAL (ER)     COMPARISON:  [facility], CT ABDOMEN+PELVIS(CPT=74176), 10/29/2017, 8:04 PM.     INDICATIONS:  n/v nausea vomiting     TECHNIQUE:  CT scanning was performed from the dome of the diaphragm to the pubic symphysis with non-ionic intravenous contrast material. Post contrast coronal MPR imaging was performed.  Dose reduction techniques were used. Dose information is  transmitted to the ACR (American College of Radiology) NRDR (National Radiology Data Registry) which includes the Dose Index Registry.     PATIENT STATED HISTORY:(As transcribed by Technologist)  n/v      CONTRAST USED:  80cc of Isovue 370     FINDINGS:          LIVER:  Unremarkable.     BILIARY:  Gallbladder is dilated 5.8 cm transverse dimension.  Fluid present around the gallbladder and  small amount of fluid in Szymanski's pouch.  No gas bubbles in the gallbladder lumen or wall or visible stone.  The common bile duct does not appear  dilated.  There may be a tiny duodenal diverticulum..     PANCREAS:  Unremarkable.     SPLEEN:  Unremarkable.     KIDNEYS:  No acute abnormality.  Tiny probable cysts     ADRENALS:  Unremarkable.     AORTA/VASCULAR:  No aortic aneurysm. There are atherosclerotic changes including calcification involving the aorta, but no evidence for aneurysm involving the aorta.     RETROPERITONEUM:  Unremarkable.     BOWEL/MESENTERY:  Dilated air and fluid-filled loops of small bowel maximum dimension 5.3 cm in the left upper quadrant.  Small bowel dilation appears to have a transition in the medial right mid abdomen in a region of enteric staples, and thereafter the   small bowel appears decompressed to the level of the ileocecal valve.  The colon contains liquid stool and air, all the way to the rectum, the rectum is dilated with mostly liquid stool 6.9 cm transverse dimension.  A particularly narrowed segment of  colon measuring about 8 cm in length is present in the right upper quadrant, appearing to involve the upper ascending colon and splenic flexure.  The colon enhances here, and is surrounded by tissue thickening and stranding and some fluid.  The adjacent  gallbladder is dilated.  There is no large or drainable ascites, and no fluid in the pelvis or lower abdomen, but around the liver there is a rim of fluid measuring 2.2 cm. No free air seen.     ABDOMINAL WALL:  Unremarkable.     URINARY BLADDER:  Unremarkable.     LYMPH NODES PELVIS:  Unremarkable.     PELVIC ORGANS:  No acute process.     LUNG BASES:  Trace right pleural effusion and bilateral patchy lower lobe opacities likely atelectasis.  Some areas of scarring in the middle lobe and lingula.     BONES:  No acute abnormality. Note is made of degenerative changes present in the spine.                         Impression  CONCLUSION:       1. Small-bowel obstruction, with dilated small bowel measuring up to 5.2 cm, with transition point in the right abdomen in a region of enteric small bowel staples.  Suspect adhesions/stricture, possibly related to the surgery.  The small bowel distal to  this is not dilated.     2. No large or drainable ascites, but there is free fluid in the right upper quadrant around the liver.     3. In addition, the gallbladder is dilated and there is stranding of the fat around this.  Correlate for any signs of acute cholecystitis.     4. A segment of colon in the right upper quadrant is narrowed, thickened and enhancing, as it traverses through a zone of inflammatory appearing stranding and fluid, adjacent to the above-described gallbladder.  Whether this is secondary to gallbladder  inflammation secondarily affecting the colon or a primary process of the colon is indeterminate.  Suggest CT scan follow-up to see if this areas persist, and if so consider colonoscopy to exclude lesions of the colon in this area.  Otherwise, the colon  contains liquid stool from the cecum to the rectum.     5. No free air.  Trace right pleural effusion and atelectasis.  Other findings as above.          LOCATION:  [facility]        Dictated by (CST): Hao Quiroz MD on 12/26/2023 at 10:54 PM      Finalized by (CST): Hao Quiroz MD on 12/26/2023 at 11:04 PM      Impression:  Patient Active Problem List   Diagnosis    ASHD (arteriosclerotic heart disease)    HNP (herniated nucleus pulposus), lumbar    Lumbar spondylosis    Myofascial pain syndrome    Atrial fibrillation and flutter (HCC)    Spondylolisthesis of lumbar region    MIKE on CPAP    Cerebral microvascular disease    Cognitive complaints    Acute on chronic congestive heart failure (HCC)    Acute on chronic congestive heart failure, unspecified heart failure type (HCC)    Fall, initial encounter    Hypomagnesemia    Alzheimer's disease of other onset  without behavioral disturbance    Weight loss, non-intentional    Hypokalemia    Leukocytosis    Hyperglycemia    Small bowel obstruction (HCC)    ACP (advance care planning)    Nausea and vomiting in adult    Dehydration    Leukocytosis, unspecified type       83-year-old gentleman who was admitted through the emergency department with complaints of abdominal pain, nausea and vomiting.  The patient does have a history of dementia and Alzheimer's.  There is no family at the bedside therefore history is obtained from records.  Per the RN he is oriented to person only.  The patient was transported by ambulance from nursing home shortly after episode of nausea and vomiting after dinner.  Per records he did have a loose bowel movement earlier that day.    Currently, the patient is laying in bed with a bear his hat pulled over his eyes.  He is arousable.  He denies abdominal pain when questioned.    CT scan reveals changes consistent with small bowel obstruction with transition point in the right abdomen and an area of a small bowel anastomosis and staple line.  Gallbladder is dilated with fat stranding although it is unclear whether this is a primary finding related to the gallbladder or secondary finding related to the colon.  Segment of the ascending and hepatic flexure of the colon is noted to be narrowed and thickened.  Serology in ED revealed leukocytosis of 19.    Past medical-surgical history prior history of small bowel resection as small bowel anastomosis is noted on CT.  Further details are unknown.  Patient was hospitalized in 2017 for SBO which was treated nonoperatively.  At that time he was seen by Dr. Alfaro.  Patient does have a history of A-fib and is chronically anticoagulated, history of Alzheimer's, history of CABG in 2014, status post MI and angioplasty 1994, status post MI in 1974, sleep apnea using CPAP, SBO, TNA, appendectomy, extensive nerve blocks    Examination-abdomen is soft, minimally  distended, no tenderness elicited to palpation in the bilateral upper and left lower quadrant.  Mild tenderness to deep palpation in the right lower quadrant.  No guarding or rebound is noted.  Well-healed midline incision.  No evidence of hernia.    No indication for emergent surgical intervention at this time.  Will continue medical management with NG tube decompression, bowel rest, IV hydration, DVT and GI prophylaxis, serial abdominal exams.  Repeat serology today.  If medical management unsuccessful, would recommend surgical intervention, laparoscopy-laparotomy, lysis of adhesions, possible bowel resection.  Additionally, will discuss with patient's medical POA regarding further workup of area of narrowing of the colon near the hepatic flexure.      Plan:  N.p.o.  IV fluids, DVT, GI prophylaxis  NG tube to low continuous suction  Serial abdominal exam    B. Jody TORO, FACS      Please note that this report has been produced using speech recognition software and may contain errors related to that system including but not limited to errors in grammar, punctuation and spelling as well as words and phrases that possibly may have been recognized inappropriately.  If there are any questions or concerns please contact the dictating provider for clarification.  The 21st Century Cures Act makes medical notes like these available to patients in the interest of trans parency. Please be advised this is a medical document. Medical documents are intended to carry relevant information, facts as evident, and the clinical opinion of the practitioner. The medical note is intended as peer to peer communication and may appear blunt or direct. It is written in medical language and may contain abbreviations or verbiage that are unfamiliar.  If there are any questions or concerns please contact the dictating provider for clarification.      Vera Crook PA-C  12/27/2023  9:26 AM    Is this a shared or split note between  Advanced Practice Provider and Physician? Yes        Electronically signed by Sary Vera MD on 12/28/2023 12:20 PM           D/C Summary    No notes of this type exist for this encounter.     Imaging Results (HF patients)    Chest X-Ray Results (HF patients only)    No exam resulted this encounter.      2D Echocardiogram Results (HF patients only)    No exam resulted this encounter.      Cath Angiogram Results (HF Patients only)    No exam resulted this encounter.          Physical Therapy Notes (last 72 hours)      Physical Therapy Note signed by Vera Pugh PT at 1/2/2024 10:48 AM  Version 1 of 1    Author: Vera Pugh PT Service: Rehab Author Type: Physical Therapist    Filed: 1/2/2024 10:48 AM Date of Service: 1/2/2024 10:43 AM Status: Signed    : Vera Pugh PT (Physical Therapist)             PHYSICAL THERAPY RE-EVALUATION - INPATIENT     Room Number: 376/376-A  Evaluation Date: 1/2/2024  Type of Evaluation: Re-evaluation  Physician Order: PT Eval and Treat    Presenting Problem: SBO  Co-Morbidities : afib, Alzheimers dementia, CHF, CAD, HTN, MI, CABG  Reason for Therapy: Mobility Dysfunction and Discharge Planning    ASSESSMENT   Pt is a 83 year old male admitted on 12/26/2023 for SBO. Functional outcome measures completed include [facility].  The AM-PAC '6-Clicks' Inpatient Basic Mobility Short Form was completed and this patient is demonstrating a Approx Degree of Impairment: 46.58%  degree of impairment in mobility. Research supports that patients with this level of impairment may benefit from home with incr supervision.  PT Discharge Recommendations: Home (increased supervision)      PLAN  Patient has been evaluated and presents with no skilled Physical Therapy needs at this time.  Patient discharged from Physical Therapy services.  Please re-order if a new functional limitation presents during this admission.    GOALS  Patient was able to achieve the following goals ...    Patient  was able to transfer At previous, functional level  Safely w/ assist   Patient able to ambulate on level surfaces At previous, functional level  CGA with RW     HOME SITUATION  Type of Home: Other (Comment) (Memory Care)   Home Layout: One level                Lives With: Staff 24 hours  Drives: No  Patient Owned Equipment: Rolling walker;Wheelchair       Prior Level of [place]: Per initial eval completed on - OT spoke with pt's dtr as pt is poor historian - pt typically ambulates a very short distance to and from bathroom with RW and to the dining liz (3 rooms down), otherwise stays in his recliner. Pt sleeps in the recliner. Pt goes on outings with dtr for a few hours at a time. Per pt's dtr, pt can have incr supervision initially at time of dc back to memory care.       SUBJECTIVE  \"Where's the thing with the red button?\"      OBJECTIVE  Precautions: Bed/chair alarm  Fall Risk: High fall risk    WEIGHT BEARING RESTRICTION  Weight Bearing Restriction: None                PAIN ASSESSMENT  Ratin          COGNITION  Overall Cognitive Status:  Impaired and pt with Alzheimer's dementia    RANGE OF MOTION AND STRENGTH ASSESSMENT  Upper extremity ROM and strength are within functional limits     Lower extremity ROM is within functional limits     Lower extremity strength is within functional limits       BALANCE  Static Sitting: Good  Dynamic Sitting: Fair +  Static Standing: Fair -  Dynamic Standing: Poor +    ADDITIONAL TESTS                                    ACTIVITY TOLERANCE                         O2 WALK       NEUROLOGICAL FINDINGS                        AM-PAC '6-Clicks' INPATIENT SHORT FORM - BASIC MOBILITY  How much difficulty does the patient currently have...  Patient Difficulty: Turning over in bed (including adjusting bedclothes, sheets and blankets)?: A Little   Patient Difficulty: Sitting down on and standing up from a chair with arms (e.g., wheelchair, bedside commode, etc.): A Little    Patient Difficulty: Moving from lying on back to sitting on the side of the bed?: A Little   How much help from another person does the patient currently need...   Help from Another: Moving to and from a bed to a chair (including a wheelchair)?: A Little   Help from Another: Need to walk in hospital room?: A Little   Help from Another: Climbing 3-5 steps with a railing?: A Little       AM-PAC Score:  Raw Score: 18   Approx Degree of Impairment: 46.58%   Standardized Score (AM-PAC Scale): 43.63   CMS Modifier (G-Code): CK    FUNCTIONAL ABILITY STATUS  Gait Assessment   Functional Mobility/Gait Assessment  Gait Assistance: Contact guard assist  Distance (ft): 50  Assistive Device: Rolling walker  Pattern: Shuffle    Skilled Therapy Provided     Bed Mobility: pt sleeps in recliner chair at home   Rolling: NT  Supine to sit: supervision with incr time   Sit to supine: supervision with incr time      Transfer Mobility:  Sit to stand: Christiano to RW- v/c for hand placemen   Stand to sit: Christiano  Gait = CGA w/ RW x 50 feet, shuffle pattern.     Therapist's comments: Patient presents laying in bed with [place] bears hat pulled over his face. Pt pleasantly confused- initially not agreeable to work with therapy but with encouragement agrees to walk. Bed mobility at supervision with incr time. Sit to stand Christiano to RW. Ambulated 50 feet w/ RW at CGA. Once back in room bent over and was looking at what was under his bed (tissues), with no loss of balance. Pt requesting to return to bed at end of session. Pt continues to be functioning at his baseline level and has no skilled PT needs at this time. If change in functional mobility status occurs please re-order therapy services.  RN staff aware pt needs to be up and walking multiple times a day.     Exercise/Education Provided:  Bed mobility  Body mechanics  Energy conservation  Functional activity tolerated  Gait training  Transfer training    Patient End of Session: In bed;Needs  met;Call light within reach;RN aware of session/findings;All patient questions and concerns addressed;Alarm set;Discussed recommendations with /    Patient Evaluation Complexity Level:  History Moderate - 1 or 2 personal factors and/or co-morbidities   Examination of body systems Low - addressing 1-2 elements   Clinical Presentation Low - Stable   Clinical Decision Making Low Complexity       PT Session Time: 15 minutes  Gait Training:  minutes  Therapeutic Activity:  minutes  Neuromuscular Re-education:  minutes  Therapeutic Exercise:  minutes                   Occupational Therapy Notes (last 72 hours)  Notes from 12/30/2023  2:43 PM through 1/2/2024  2:43 PM   No notes of this type exist for this encounter.     Video Swallow Study Notes    No notes of this type exist for this encounter.     SLP Notes    No notes of this type exist for this encounter.     Immunizations     Name Date      Covid-19 Pfizer 02/10/21     INFLUENZA 09/14/17     INFLUENZA 11/05/16     INFLUENZA 10/01/15     INFLUENZA 09/23/15     INFLUENZA 10/01/14     INFLUENZA 10/01/12     INFLUENZA 10/08/11     INFLUENZA 09/17/10     Methylprednisolone 40 Mg Inj 08/06/15     Methylprednisolone 40 Mg Inj 07/02/15     Methylprednisolone 40 Mg Inj 02/24/14     Methylprednisolone 80 mg 12/09/15     Pneumococcal (Prevnar 13) 11/28/17     Pneumovax 07/07/14       Multidisciplinary Problems     Active Goals        Problem: Patient/Family Goals    Goal Priority Disciplines Outcome Interventions   Patient/Family Long Term Goal     Interdisciplinary Progressing    Description: Patient's Long Term Goal: benny    Interventions:  - BENNY  - See additional Care Plan goals for specific interventions   Patient/Family Short Term Goal     Interdisciplinary Progressing    Description: Patient's Short Term Goal: to be pain free    Interventions:   - pain meds  -Ng tube out  - See additional Care Plan goals for specific interventions

## (undated) NOTE — IP AVS SNAPSHOT
Patient Demographics     Address  [address] [place] [address] Phone  [phone] [facility]) *Preferred*  [phone] [facility]) E-mail Address  [email or web address]      Emergency Contact(s)     Name Relation Home Work [address] [facility] 3. Limit your fluid intake to no more than 2 liters or 64 ounces per day    4. Some exercise and activity is important to help keep your heart functioning and strong.  Unless instructed not to exercise, you may walk at a slow to moderate pace for 10-15 brandee Gaye Larry MD         escitalopram 10 MG Tabs  Commonly known as:  LEXAPRO      Take 1 tablet (10 mg total) by mouth daily.    Gaye Larry MD         furosemide 20 MG Tabs  Commonly known as:  LASIX      Take 1 tablet (20 mg total) by mouth 2 Order ID Medication Name Action Time Action Reason Comments    [id number] Donepezil HCl (ARICEPT) tab 10 mg 07/24/18 1953 Given      [id number] HYDROcodone-acetaminophen (NORCO) 5-325 MG per tab 1 tablet 07/24/18 1953 Given      [id number] Memantine HCl (NAM Component Value Reference Range Flag Lab   Glucose 84 70 - 99 mg/dL — [facility]   BUN 29 8 - 20 mg/dL H [facility]   Creatinine 1.26 0.70 - 1.30 mg/dL — [facility]   GFR, Non- 54 >=60 L [facility]   GFR, -American 62 >=60 — [facility] Filed:  7/17/2018  2:18 PM Date of Service:  7/17/2018 11:05 AM Status:  Signed    :  Betsy Romero MD (Physician)       [place] [facility]ist History and Physical      Patient presents with:  Fall (musculoskeletal, neurologic)       PCP: Hamzah Carter 1/8/2014: DRAIN/INJECT LARGE JOINT/BURSA      Comment: Procedure: BURSA INJECTION;  Surgeon:                Charyl Hatchet, MD;  Location: [place] [address] MANAGEMENT  1/8/2014: FLUOROSCOPIC GUIDANCE NEEDLE PLACEMENT      Comment: Proced MD;  Location: [address] [place] [address] [place] MANAGEMENT  8/5/2015: INJ PARAVERT F JNT L/S 3 LEV Right      Comment: Procedure: LUMBAR FACET INJECTION OR MEDIAL                BRANCH NERVE BLOCK;  Surgeon: Leigha Nazario MD;  Location:  Comment: Procedure: TRANSFORAMINAL EPIDURAL - LUMBAR;                 Surgeon: Corbin Garcia MD;  Location: [address] [place] [address] MANAGEMENT  3/10/2016: INJECTION, ANESTHETIC/STEROID, TRANSFORAMINAL * Right      Comment: Procedure: TRANS Comment: Procedure: LUMBAR FACET INJECTION OR MEDIAL                BRANCH NERVE BLOCK;  Surgeon: Karol Benavidez MD;  Location: [address] [place] [address] MANAGEMENT  8/20/2015: PATIENT DOCUMENTED NOT TO HAVE EXPERIENCED ANY* N/A      Comment: Surgeon: Lilo Lozano MD;  Location: [address] [place] [address] MANAGEMENT  3/29/2016: PATIENT DOCUMENTED NOT TO HAVE EXPERIENCED ANY* Right      Comment: Procedure: TRANSFORAMINAL EPIDURAL - LUMBAR;                 Surgeon: Krys Walker 1/14/2016: PATIENT [place] PREOPERATIVE ORDER FOR IV ANT* Right      Comment: Procedure: LUMBAR FACET INJECTION OR MEDIAL                BRANCH NERVE BLOCK;  Surgeon: Mine Sommers MD;  Location: [address] [place] [address] MANAGEMENT  2/1/2016: Head:  Normocephalic, without obvious abnormality, atraumatic. Eyes:  Sclera anicteric, No conjunctival pallor, EOMs intact. Nose: Nares normal. Septum midline. Mucosa normal. No drainage.    Throat: Lips, mucosa, and tongue normal. Teeth and gums norm FINDINGS:  Bilateral dependent airspace disease. Decreased pulmonary volume. Mild pulmonary venous engorgement but no evidence of overt congestive failure. Mild cardiomegaly. No pneumothorax. Bilateral effusions.       CONCLUSION:   Bilateral pleura Electronically signed by Obey Jerry MD on 7/17/2018  2:18 PM   Attribution Laverne Guidry. 1 - Obey Jerry MD on 7/17/2018  1:41 PM  JS. 2 - Obey Jerry MD on 7/17/2018  2:11 PM  JS. 3 - Obey Jerry MD on 7/17/2018  1:54 PM afib after cabg, resolved now   • ASHD (arteriosclerotic heart disease) 10/8/2012   • Atherosclerosis of coronary artery    • Corn 11/18/2013   • Coronary atherosclerosis of unspecified type of vessel, native or graft 1994    S/P MI, angioplasty LAD   • E BRANCH NERVE BLOCK;  Surgeon: Bhanu Hatch MD;  Location: [address] [place] [address] MANAGEMENT  1/14/2016: INJ PARAVERT F JNT L/S 1 LEV Right      Comment: Procedure: LUMBAR FACET INJECTION OR MEDIAL                BRANCH NERVE BLO 3/2/2016: INJECTION, ANESTHETIC/STEROID, TRANSFORAMINAL * Right      Comment: Procedure: TRANSFORAMINAL EPIDURAL - LUMBAR;                 Surgeon: Chava Márquez MD;  Location: [address] [place] [address] MANAGEMENT  3/10/2016: INJECTION, ANESTHET Jonna Grewal MD;  Location: [address] [place] [place] [address] Drive MANAGEMENT  9/18/2015: INJECTION, W/WO CONTRAST, DX/THERAPEUTIC SUBST* N/A      Comment: Procedure: LUMBAR EPIDURAL;  Surgeon:                Aurea Jaquez MD;  [place] [facility] [place]: Procedure: LUMBAR EPIDURAL;  Surgeon:                Fly Melendez MD;  Location: [place] [address] MANAGEMENT  10/2/2015: PATIENT DOCUMENTED NOT TO HAVE EXPERIENCED ANY* N/A      Comment: Procedure: LUMBAR EPIDURAL;  D[facility] Leo PAIN MANAGEMENT  8/5/2015: PATIENT [place] PREOPERATIVE ORDER FOR IV ANT* Right      Comment: Procedure: LUMBAR FACET INJECTION OR MEDIAL                BRANCH NERVE BLOCK;  Surgeon: Aurea Jaquez MD;  Location: [address] [place] [address] Comment: Procedure: TRANSFORAMINAL EPIDURAL - LUMBAR;                 Surgeon: Gabby Culver MD;  Location: [address] [place] [address] MANAGEMENT  3/29/2016: PATIENT [place] PREOPERATIVE ORDER FOR IV ANT* Right      Comment: Procedure: TRANS METOPROLOL SUCCINATE ER 25 MG Oral Tablet 24 Hr TAKE 1 TABLET BY MOUTH EVERY DAY   Donepezil HCl 10 MG Oral Tab Take 1 tablet (10 mg total) by mouth nightly. escitalopram 10 MG Oral Tab Take 1 tablet (10 mg total) by mouth daily.    Warfarin Sodium 5 MG O Mental status exam: The patient is oriented to being in the hospital.  Language and praxis are normal.   Cranial nerves: The voice is strong. Speech is clear. Facies are symmetric. Facial strength is full. The gaze is conjugate. There is no ptosis.   The Nimco Strong MD on 7/18/2018 at 10:27     Approved by: Nimco Strong MD            Xr Chest Ap Portable  (cpt=71045)    Result Date: 7/17/2018  CONCLUSION:   Bilateral pleural effusions with bilateral lower zone airspace disease.      Dictated by: [employer]*                                                     [address]  [facility] Rehan Germán Cartagena, [address]                                                               [phone] --------------- Tricuspid valve: There was moderate-severe regurgitation. 8.  Pulmonary arteries: Systolic pressure could not be accurately estimated     and underestimated due to severity of tricuspid regurgitation. 9. Inferior vena cava:  The vessel was dilated with a d was within the normal range. There was no evidence for stenosis. There was trivial regurgitation. Pericardium:  There was no pericardial effusion. Pleura: There was a right pleural effusion. There was a left pleural effusion. Aorta:   Aortic root was 3.9cm - 58  LA volume/bsa, S                        (H)     49    ml/m^2 16 - 28  LA volume, ES, 1-p A4C                  (H)     107   ml     18 - 58  LA volume/bsa, ES, 1-p A4C                      49    ml/m^2 ---------  LA volume, ES, 1-p A2C Physical Therapy Notes (last 72 hours) (Notes from 7/22/2018  2:24 PM through 7/25/2018  2:24 PM)     No notes of this type exist for this encounter.          Occupational Therapy Notes (last 72 hours) (Notes from 7/22/2018  2:24 PM through 7/25/2018  2:24 • Coronary atherosclerosis of unspecified type of vessel, native or graft 1994    S/P MI, angioplasty LAD   • Esophageal reflux    • Essential hypertension    • Hearing impairment    • Heart attack [facility]) 1974   • Hernia     will be having surgery in the fu Comment: Procedure: LUMBAR FACET INJECTION OR MEDIAL                BRANCH NERVE BLOCK;  Surgeon: Augustus Benavides MD;  Location: [address] [place] [address] MANAGEMENT  2/1/2016: INJ PARAVERT F JNT L/S 1 LEV Right      Comment: Procedure: LUMBAR Surgeon: Lina Brasher MD;  Location: [address] [place] [address] MANAGEMENT  3/10/2016: INJECTION, ANESTHETIC/STEROID, TRANSFORAMINAL * Right      Comment: Procedure: TRANSFORAMINAL EPIDURAL - LUMBAR;                 Surgeon: Antionette Dorado 9/18/2015: INJECTION, W/WO CONTRAST, DX/THERAPEUTIC SUBST* N/A      Comment: Procedure: LUMBAR EPIDURAL;  Surgeon:                Lilo Lozano MD;  Location: [place] [address] MANAGEMENT  10/2/2015: INJECTION, W/WO CONTRAST, DX/THERAPEU PAIN MANAGEMENT  10/2/2015: PATIENT DOCUMENTED NOT TO HAVE EXPERIENCED ANY* N/A      Comment: Procedure: LUMBAR EPIDURAL;  Surgeon:                Lilo Lozano MD;  Location: [place] [address] MANAGEMENT  1/14/2016: PATIENT Comment: Procedure: LUMBAR FACET INJECTION OR MEDIAL                BRANCH NERVE BLOCK;  Surgeon: Shereen Malloy MD;  Location: [address] [place] [address] [place] MANAGEMENT  8/20/2015: PATIENT [facility] PREOPERATIVE ORDER FOR IV ANT* N/A      Comment: Surgeon: Jane Vergara MD;  Location: [address] [place] [address] MANAGEMENT  3/29/2016: PATIENT [place] PREOPERATIVE ORDER FOR IV ANT* Right      Comment: Procedure: TRANSFORAMINAL EPIDURAL - LUMBAR;                 Surgeon: Alejandro Sanabria order his lunch. RN aware. Pt needing verbal/visual cues throughout for safety and RW management. [CC.2]  Patient End of Session: In bed;Needs met;Call light within reach;RN aware of session/findings; All patient questions and concerns addressed; Discussed Patient will perform upper body dressing:  with supervision and while in chair  Patient will perform lower body dressing:  with setup, with min assist, while in chair and with adaptive equipment PRN  Patient will perform toileting: with supervision  Met

## (undated) NOTE — ED AVS SNAPSHOT
[facility] Emergency Department    [place]  [address]    [place] [address]    Phone:  [phone]    Fax:  [address]   MRN: [id number]    Department:  [facility] Emergency Department   Date of Visit:  4 side effects. Medication List      Notice     You have not been prescribed any medications.             Discharge References/Attachments     HEADACHE, UNSPECIFIED (ENGLISH)      Disclosure     Insurance plans vary and the physician(s) referred CARE PHYSICIAN AT ONCE OR RETURN IMMEDIATELY TO THE EMERGENCY DEPARTMENT.     If you have been prescribed any medication(s), please fill your prescription right away and begin taking the medication(s) as directed    If the emergency physician has read X-ray coverage. Patient [address] is a Federal Navigator program that can help with your Affordable Care Act coverage, as well as all types of Medicaid plans.   To get signed up and covered, please call [phone] and ask to get set up for an insuran